# Patient Record
Sex: FEMALE | Race: OTHER
[De-identification: names, ages, dates, MRNs, and addresses within clinical notes are randomized per-mention and may not be internally consistent; named-entity substitution may affect disease eponyms.]

---

## 2018-09-02 ENCOUNTER — HOSPITAL ENCOUNTER (EMERGENCY)
Dept: HOSPITAL 8 - ED | Age: 23
LOS: 1 days | Discharge: HOME | End: 2018-09-03
Payer: COMMERCIAL

## 2018-09-02 VITALS — HEIGHT: 65 IN | BODY MASS INDEX: 35.96 KG/M2 | WEIGHT: 215.83 LBS

## 2018-09-02 DIAGNOSIS — N89.8: Primary | ICD-10-CM

## 2018-09-02 LAB
CULTURE INDICATED?: NO
HCG UR SG: 1.01 (ref 1–1.03)
MICROSCOPIC: (no result)

## 2018-09-02 PROCEDURE — 87808 TRICHOMONAS ASSAY W/OPTIC: CPT

## 2018-09-02 PROCEDURE — 87210 SMEAR WET MOUNT SALINE/INK: CPT

## 2018-09-02 PROCEDURE — 87591 N.GONORRHOEAE DNA AMP PROB: CPT

## 2018-09-02 PROCEDURE — 96372 THER/PROPH/DIAG INJ SC/IM: CPT

## 2018-09-02 PROCEDURE — 99284 EMERGENCY DEPT VISIT MOD MDM: CPT

## 2018-09-02 PROCEDURE — 81025 URINE PREGNANCY TEST: CPT

## 2018-09-02 PROCEDURE — 81003 URINALYSIS AUTO W/O SCOPE: CPT

## 2018-09-02 PROCEDURE — 87491 CHLMYD TRACH DNA AMP PROBE: CPT

## 2018-09-03 VITALS — SYSTOLIC BLOOD PRESSURE: 121 MMHG | DIASTOLIC BLOOD PRESSURE: 85 MMHG

## 2018-09-03 LAB
CLUE CELLS: (no result)
T VAGINALIS RRNA GENITAL QL PROBE: (no result)
WET PREP WBCS: (no result)

## 2018-09-09 ENCOUNTER — HOSPITAL ENCOUNTER (EMERGENCY)
Facility: MEDICAL CENTER | Age: 23
End: 2018-09-09
Attending: EMERGENCY MEDICINE
Payer: MEDICAID

## 2018-09-09 ENCOUNTER — APPOINTMENT (OUTPATIENT)
Dept: RADIOLOGY | Facility: MEDICAL CENTER | Age: 23
End: 2018-09-09
Attending: EMERGENCY MEDICINE
Payer: MEDICAID

## 2018-09-09 VITALS
RESPIRATION RATE: 16 BRPM | OXYGEN SATURATION: 98 % | DIASTOLIC BLOOD PRESSURE: 58 MMHG | HEART RATE: 80 BPM | SYSTOLIC BLOOD PRESSURE: 107 MMHG | HEIGHT: 66 IN | WEIGHT: 215.61 LBS | BODY MASS INDEX: 34.65 KG/M2 | TEMPERATURE: 98.1 F

## 2018-09-09 DIAGNOSIS — K59.00 CONSTIPATION, UNSPECIFIED CONSTIPATION TYPE: ICD-10-CM

## 2018-09-09 DIAGNOSIS — K62.5 RECTAL BLEEDING: ICD-10-CM

## 2018-09-09 LAB
ALBUMIN SERPL BCP-MCNC: 4.2 G/DL (ref 3.2–4.9)
ALBUMIN/GLOB SERPL: 1.3 G/DL
ALP SERPL-CCNC: 55 U/L (ref 30–99)
ALT SERPL-CCNC: 17 U/L (ref 2–50)
ANION GAP SERPL CALC-SCNC: 8 MMOL/L (ref 0–11.9)
APPEARANCE UR: CLEAR
AST SERPL-CCNC: 19 U/L (ref 12–45)
BACTERIA #/AREA URNS HPF: ABNORMAL /HPF
BASOPHILS # BLD AUTO: 0.3 % (ref 0–1.8)
BASOPHILS # BLD: 0.03 K/UL (ref 0–0.12)
BILIRUB SERPL-MCNC: 0.3 MG/DL (ref 0.1–1.5)
BILIRUB UR QL STRIP.AUTO: NEGATIVE
BUN SERPL-MCNC: 12 MG/DL (ref 8–22)
CALCIUM SERPL-MCNC: 9.8 MG/DL (ref 8.5–10.5)
CHLORIDE SERPL-SCNC: 105 MMOL/L (ref 96–112)
CO2 SERPL-SCNC: 25 MMOL/L (ref 20–33)
COLOR UR: YELLOW
CREAT SERPL-MCNC: 0.74 MG/DL (ref 0.5–1.4)
EOSINOPHIL # BLD AUTO: 0.08 K/UL (ref 0–0.51)
EOSINOPHIL NFR BLD: 0.8 % (ref 0–6.9)
EPI CELLS #/AREA URNS HPF: ABNORMAL /HPF
ERYTHROCYTE [DISTWIDTH] IN BLOOD BY AUTOMATED COUNT: 40.6 FL (ref 35.9–50)
GLOBULIN SER CALC-MCNC: 3.3 G/DL (ref 1.9–3.5)
GLUCOSE SERPL-MCNC: 116 MG/DL (ref 65–99)
GLUCOSE UR STRIP.AUTO-MCNC: NEGATIVE MG/DL
HCG UR QL: NEGATIVE
HCT VFR BLD AUTO: 37.6 % (ref 37–47)
HGB BLD-MCNC: 12.3 G/DL (ref 12–16)
HYALINE CASTS #/AREA URNS LPF: ABNORMAL /LPF
IMM GRANULOCYTES # BLD AUTO: 0.02 K/UL (ref 0–0.11)
IMM GRANULOCYTES NFR BLD AUTO: 0.2 % (ref 0–0.9)
INR PPP: 1.05 (ref 0.87–1.13)
KETONES UR STRIP.AUTO-MCNC: NEGATIVE MG/DL
LEUKOCYTE ESTERASE UR QL STRIP.AUTO: ABNORMAL
LYMPHOCYTES # BLD AUTO: 2.92 K/UL (ref 1–4.8)
LYMPHOCYTES NFR BLD: 29.6 % (ref 22–41)
MCH RBC QN AUTO: 27.4 PG (ref 27–33)
MCHC RBC AUTO-ENTMCNC: 32.7 G/DL (ref 33.6–35)
MCV RBC AUTO: 83.7 FL (ref 81.4–97.8)
MICRO URNS: ABNORMAL
MONOCYTES # BLD AUTO: 0.4 K/UL (ref 0–0.85)
MONOCYTES NFR BLD AUTO: 4 % (ref 0–13.4)
NEUTROPHILS # BLD AUTO: 6.43 K/UL (ref 2–7.15)
NEUTROPHILS NFR BLD: 65.1 % (ref 44–72)
NITRITE UR QL STRIP.AUTO: NEGATIVE
NRBC # BLD AUTO: 0 K/UL
NRBC BLD-RTO: 0 /100 WBC
PH UR STRIP.AUTO: 7 [PH]
PLATELET # BLD AUTO: 299 K/UL (ref 164–446)
PMV BLD AUTO: 10.6 FL (ref 9–12.9)
POTASSIUM SERPL-SCNC: 3.6 MMOL/L (ref 3.6–5.5)
PROT SERPL-MCNC: 7.5 G/DL (ref 6–8.2)
PROT UR QL STRIP: NEGATIVE MG/DL
PROTHROMBIN TIME: 13.4 SEC (ref 12–14.6)
RBC # BLD AUTO: 4.49 M/UL (ref 4.2–5.4)
RBC # URNS HPF: ABNORMAL /HPF
RBC UR QL AUTO: NEGATIVE
SODIUM SERPL-SCNC: 138 MMOL/L (ref 135–145)
SP GR UR REFRACTOMETRY: 1.01
SP GR UR STRIP.AUTO: 1.01
UROBILINOGEN UR STRIP.AUTO-MCNC: 0.2 MG/DL
WBC # BLD AUTO: 9.9 K/UL (ref 4.8–10.8)
WBC #/AREA URNS HPF: ABNORMAL /HPF

## 2018-09-09 PROCEDURE — 85025 COMPLETE CBC W/AUTO DIFF WBC: CPT

## 2018-09-09 PROCEDURE — 36415 COLL VENOUS BLD VENIPUNCTURE: CPT

## 2018-09-09 PROCEDURE — 81001 URINALYSIS AUTO W/SCOPE: CPT

## 2018-09-09 PROCEDURE — 85610 PROTHROMBIN TIME: CPT

## 2018-09-09 PROCEDURE — 700102 HCHG RX REV CODE 250 W/ 637 OVERRIDE(OP): Performed by: EMERGENCY MEDICINE

## 2018-09-09 PROCEDURE — 82272 OCCULT BLD FECES 1-3 TESTS: CPT

## 2018-09-09 PROCEDURE — 80053 COMPREHEN METABOLIC PANEL: CPT

## 2018-09-09 PROCEDURE — 74018 RADEX ABDOMEN 1 VIEW: CPT

## 2018-09-09 PROCEDURE — 81025 URINE PREGNANCY TEST: CPT

## 2018-09-09 PROCEDURE — A9270 NON-COVERED ITEM OR SERVICE: HCPCS | Performed by: EMERGENCY MEDICINE

## 2018-09-09 PROCEDURE — 99284 EMERGENCY DEPT VISIT MOD MDM: CPT

## 2018-09-09 RX ORDER — POLYETHYLENE GLYCOL 3350 17 G/17G
17 POWDER, FOR SOLUTION ORAL
Qty: 7 EACH | Refills: 0 | Status: ON HOLD | OUTPATIENT
Start: 2018-09-09 | End: 2019-09-27

## 2018-09-09 RX ORDER — IPRATROPIUM BROMIDE AND ALBUTEROL SULFATE 2.5; .5 MG/3ML; MG/3ML
3 SOLUTION RESPIRATORY (INHALATION) ONCE
Status: DISCONTINUED | OUTPATIENT
Start: 2018-09-09 | End: 2018-09-09

## 2018-09-09 RX ORDER — POLYETHYLENE GLYCOL 3350 17 G/17G
1 POWDER, FOR SOLUTION ORAL ONCE
Status: COMPLETED | OUTPATIENT
Start: 2018-09-09 | End: 2018-09-09

## 2018-09-09 RX ORDER — DOCUSATE SODIUM 100 MG/1
100 CAPSULE, LIQUID FILLED ORAL 2 TIMES DAILY
Qty: 14 CAP | Refills: 0 | Status: SHIPPED | OUTPATIENT
Start: 2018-09-09 | End: 2018-09-16

## 2018-09-09 RX ADMIN — POLYETHYLENE GLYCOL 3350 1 PACKET: 17 POWDER, FOR SOLUTION ORAL at 22:45

## 2018-09-09 ASSESSMENT — PAIN SCALES - GENERAL
PAINLEVEL_OUTOF10: 0
PAINLEVEL_OUTOF10: 9

## 2018-09-10 NOTE — ED NOTES
Patient is being discharged from ED to HOME  . Discharge instructions were discussed by RN with patient and/or Family. No questions at this time. Medications were discussed with patient. VS within normal limits or have been addressed with patient and MD.

## 2018-09-10 NOTE — DISCHARGE INSTRUCTIONS
Constipation, Adult  Constipation is when a person has fewer bowel movements in a week than normal, has difficulty having a bowel movement, or has stools that are dry, hard, or larger than normal. Constipation may be caused by an underlying condition. It may become worse with age if a person takes certain medicines and does not take in enough fluids.  Follow these instructions at home:  Eating and drinking  · Eat foods that have a lot of fiber, such as fresh fruits and vegetables, whole grains, and beans.  · Limit foods that are high in fat, low in fiber, or overly processed, such as french fries, hamburgers, cookies, candies, and soda.  · Drink enough fluid to keep your urine clear or pale yellow.  General instructions  · Exercise regularly or as told by your health care provider.  · Go to the restroom when you have the urge to go. Do not hold it in.  · Take over-the-counter and prescription medicines only as told by your health care provider. These include any fiber supplements.  · Practice pelvic floor retraining exercises, such as deep breathing while relaxing the lower abdomen and pelvic floor relaxation during bowel movements.  · Watch your condition for any changes.  · Keep all follow-up visits as told by your health care provider. This is important.  Contact a health care provider if:  · You have pain that gets worse.  · You have a fever.  · You do not have a bowel movement after 4 days.  · You vomit.  · You are not hungry.  · You lose weight.  · You are bleeding from the anus.  · You have thin, pencil-like stools.  Get help right away if:  · You have a fever and your symptoms suddenly get worse.  · You leak stool or have blood in your stool.  · Your abdomen is bloated.  · You have severe pain in your abdomen.  · You feel dizzy or you faint.  This information is not intended to replace advice given to you by your health care provider. Make sure you discuss any questions you have with your health care  "provider.  Document Released: 09/15/2005 Document Revised: 07/07/2017 Document Reviewed: 06/07/2017  [employer] Interactive Patient Education © 2017 [employer]        Bloody Stools  Bloody stools often mean that there is a problem in the digestive tract. Your caregiver may use the term \"melena\" to describe black, tarry, and bad smelling stools or \"hematochezia\" to describe red or maroon-colored stools. Blood seen in the stool can be caused by bleeding anywhere along the intestinal tract.   A black stool usually means that blood is coming from the upper part of the gastrointestinal tract (esophagus, stomach, or small bowel). Passing maroon-colored stools or bright red blood usually means that blood is coming from lower down in the large bowel or the rectum. However, sometimes massive bleeding in the stomach or small intestine can cause bright red bloody stools.   Consuming black licorice, lead, iron pills, medicines containing bismuth subsalicylate, or blueberries can also cause black stools. Your caregiver can test black stools to see if blood is present.  It is important that the cause of the bleeding be found. Treatment can then be started, and the problem can be corrected. Rectal bleeding may not be serious, but you should not assume everything is okay until you know the cause. It is very important to follow up with your caregiver or a specialist in gastrointestinal problems.  CAUSES   Blood in the stools can come from various underlying causes. Often, the cause is not found during your first visit. Testing is often needed to discover the cause of bleeding in the gastrointestinal tract. Causes range from simple to serious or even life-threatening. Possible causes include:  · Hemorrhoids. These are veins that are full of blood (engorged) in the rectum. They cause pain, inflammation, and may bleed.  · Anal fissures. These are areas of painful tearing which may bleed. They are often caused by passing hard " stool.  · Diverticulosis. These are pouches that form on the colon over time, with age, and may bleed significantly.  · Diverticulitis. This is inflammation in areas with diverticulosis. It can cause pain, fever, and bloody stools, although bleeding is rare.  · Proctitis and colitis. These are inflamed areas of the rectum or colon. They may cause pain, fever, and bloody stools.  · Polyps and cancer. Colon cancer is a leading cause of preventable cancer death. It often starts out as precancerous polyps that can be removed during a colonoscopy, preventing progression into cancer. Sometimes, polyps and cancer may cause rectal bleeding.  · Gastritis and ulcers. Bleeding from the upper gastrointestinal tract (near the stomach) may travel through the intestines and produce black, sometimes tarry, often bad smelling stools. In certain cases, if the bleeding is fast enough, the stools may not be black, but red and the condition may be life-threatening.  SYMPTOMS   You may have stools that are bright red and bloody, that are normal color with blood on them, or that are dark black and tarry. In some cases, you may only have blood in the toilet bowl. Any of these cases need medical care. You may also have:  · Pain at the anus or anywhere in the rectum.  · Lightheadedness or feeling faint.  · Extreme weakness.  · Nausea or vomiting.  · Fever.  DIAGNOSIS  Your caregiver may use the following methods to find the cause of your bleeding:  · Taking a medical history. Age is important. Older people tend to develop polyps and cancer more often. If there is anal pain and a hard, large stool associated with bleeding, a tear of the anus may be the cause. If blood drips into the toilet after a bowel movement, bleeding hemorrhoids may be the problem. The color and frequency of the bleeding are additional considerations. In most cases, the medical history provides clues, but seldom the final answer.  · A visual and finger (digital) exam.  Your caregiver will inspect the anal area, looking for tears and hemorrhoids. A finger exam can provide information when there is tenderness or a growth inside. In men, the prostate is also examined.  · Endoscopy. Several types of small, long scopes (endoscopes) are used to view the colon.  · In the office, your caregiver may use a rigid, or more commonly, a flexible viewing sigmoidoscope. This exam is called flexible sigmoidoscopy. It is performed in 5 to 10 minutes.  · A more thorough exam is accomplished with a colonoscope. It allows your caregiver to view the entire 5 to 6 foot long colon. Medicine to help you relax (sedative) is usually given for this exam. Frequently, a bleeding lesion may be present beyond the reach of the sigmoidoscope. So, a colonoscopy may be the best exam to start with. Both exams are usually done on an outpatient basis. This means the patient does not stay overnight in the hospital or surgery center.  · An upper endoscopy may be needed to examine your stomach. Sedation is used and a flexible endoscope is put in your mouth, down to your stomach.  · A barium enema X-ray. This is an X-ray exam. It uses liquid barium inserted by enema into the rectum. This test alone may not identify an actual bleeding point. X-rays highlight abnormal shadows, such as those made by lumps (tumors), diverticuli, or colitis.  TREATMENT   Treatment depends on the cause of your bleeding.   · For bleeding from the stomach or colon, the caregiver doing your endoscopy or colonoscopy may be able to stop the bleeding as part of the procedure.  · Inflammation or infection of the colon can be treated with medicines.  · Many rectal problems can be treated with creams, suppositories, or warm baths.  · Surgery is sometimes needed.  · Blood transfusions are sometimes needed if you have lost a lot of blood.  · For any bleeding problem, let your caregiver know if you take aspirin or other blood thinners regularly.  HOME CARE  INSTRUCTIONS   · Take any medicines exactly as prescribed.  · Keep your stools soft by eating a diet high in fiber. Prunes (1 to 3 a day) work well for many people.  · Drink enough water and fluids to keep your urine clear or pale yellow.  · Take sitz baths if advised. A sitz bath is when you sit in a bathtub with warm water for 10 to 15 minutes to soak, soothe, and cleanse the rectal area.  · If enemas or suppositories are advised, be sure you know how to use them. Tell your caregiver if you have problems with this.  · Monitor your bowel movements to look for signs of improvement or worsening.  SEEK MEDICAL CARE IF:   · You do not improve in the time expected.  · Your condition worsens after initial improvement.  · You develop any new symptoms.  SEEK IMMEDIATE MEDICAL CARE IF:   · You develop severe or prolonged rectal bleeding.  · You vomit blood.  · You feel weak or faint.  · You have a fever.  MAKE SURE YOU:  · Understand these instructions.  · Will watch your condition.  · Will get help right away if you are not doing well or get worse.  Document Released: 12/08/2003 Document Revised: 03/11/2013 Document Reviewed: 05/04/2012  [employer]® Patient Information ©2014 [employer], [employer].

## 2018-09-10 NOTE — ED PROVIDER NOTES
"CHIEF COMPLAINT  Chief Complaint   Patient presents with   • Constipation     Patient states she has been constipated since yesterday. Patient had a bowel movement 15 min. ago and had bright red blood in the bowel. Patient states she there is pain when she has a bowel movement.        HPI  Rain Stern is a 23 y.o. female who presents straining with passing bowel movements and bloody stool.  States that she typically has a bowel movement every other day.  States yesterday had straining with passage of stool and noted bleeding.  Denies vaginal bleeding.  No hematuria or dysuria.  No vomiting.  No abdominal pain.  No prior history of GI bleed.  No blood thinning agents.No chest pain, trouble breathing, lightheadedness.    REVIEW OF SYSTEMS  See HPI for further details. All other systems are negative.     PAST MEDICAL HISTORY   Denies even past medical history.  No history of chronic opioid use.    SOCIAL HISTORY  Social History     Social History Main Topics   • Smoking status: Not on file   • Smokeless tobacco: Not on file   • Alcohol use Not on file   • Drug use: Unknown   • Sexual activity: Not on file       SURGICAL HISTORY  patient denies any surgical history    CURRENT MEDICATIONS  Home Medications    **Home medications have not yet been reviewed for this encounter**         ALLERGIES  No Known Allergies    PHYSICAL EXAM  VITAL SIGNS: /71   Pulse 89   Temp 36.7 °C (98.1 °F)   Resp 16   Ht 1.676 m (5' 6\")   Wt 97.8 kg (215 lb 9.8 oz)   SpO2 98%   BMI 34.80 kg/m²   Pulse ox interpretation: I interpret this pulse ox as normal.  Constitutional: Alert in no apparent distress.  HENT: No signs of trauma, Bilateral external ears normal, Nose normal.   Eyes: Pupils are equal and reactive, Conjunctiva normal, Non-icteric.   Cardiovascular: Regular rate and rhythm, no murmurs.   Thorax & Lungs: Normal breath sounds, No respiratory distress  Abdomen: Bowel sounds normal, Soft, No tenderness, No masses, No " pulsatile masses. No peritoneal signs.  Rectal:   Trace guaiac positive; without gross blood or melena  Skin: Warm, Dry, No erythema, No rash.   Back: No bony tenderness, No CVA tenderness.   Extremities: Intact distal pulses, No edema, No tenderness, No cyanosis  Neurologic: Alert, No focal deficits noted.       DIAGNOSTIC STUDIES / PROCEDURES    LABS  Labs Reviewed   URINALYSIS - Abnormal; Notable for the following:        Result Value    Leukocyte Esterase Trace (*)     All other components within normal limits   CBC WITH DIFFERENTIAL - Abnormal; Notable for the following:     MCHC 32.7 (*)     All other components within normal limits    Narrative:     Indicate which anticoagulants the patient is on:->NONE   COMP METABOLIC PANEL - Abnormal; Notable for the following:     Glucose 116 (*)     All other components within normal limits    Narrative:     Indicate which anticoagulants the patient is on:->NONE   URINE MICROSCOPIC (W/UA) - Abnormal; Notable for the following:     RBC 5-10 (*)     Bacteria Few (*)     All other components within normal limits   HCG QUALITATIVE UR   REFRACTOMETER SG   PROTHROMBIN TIME    Narrative:     Indicate which anticoagulants the patient is on:->NONE   ESTIMATED GFR    Narrative:     Indicate which anticoagulants the patient is on:->NONE       RADIOLOGY  [id number] VIEW   Final Result         1.  Moderate stool in the colon suggests changes of constipation, otherwise nonspecific bowel gas pattern            COURSE & MEDICAL DECISION MAKING  Pertinent Labs & Imaging studies reviewed. (See chart for details)  23 y.o.  Female presenting with constipation, straining with passage of stool, resulting blood in the stool.  Bright red blood per rectum according to the patient.  No clinical signs or symptoms of anemia.  Laboratory studies with normal hemoglobin and no evidence of coagulopathy.  No abdominal tenderness.  No fevers.  No vaginal bleeding or hematuria.    On rectal examination,  "does not have evidence of anal fissures or obvious external hemorrhoids.  No active bleeding per rectum.  On rectal examination, did not palpate large hemorrhoids.  No gross blood on rectal examination.  Cannot palpate stool in the rectal vault.  Trace guaiac + on testing. Patient was referred to GI and primary care physician for further management.  We will treat with MiraLAX here and provide stool softeners and MiraLAX as needed for home care.  Recommending adequate oral hydration, regular exercise, increase fiber intake.  Less likely inflammatory bowel disease.  No evidence of anal fissures.  No left lower quadrant tenderness to suggest diverticulitis.  Symptoms most consistent with benign bright red blood per rectum episode at home secondary to constipation.    The patient will not drink alcohol nor drive with prescribed medications.   The patient will return for worsening symptoms or failure of improvement and is stable at the time of discharge. The patient verbalizes understanding in their own words.    /58   Pulse 80   Temp 36.7 °C (98.1 °F)   Resp 16   Ht 1.676 m (5' 6\")   Wt 97.8 kg (215 lb 9.8 oz)   SpO2 98%   BMI 34.80 kg/m²     [facility], Emergency Dept  [address] [facility] [address] [phone]    As needed, If symptoms worsen    Primary care doctor    Schedule an appointment as soon as possible for a visit      [facility]  Gastroenterologist follow-up  [phone]  Schedule an appointment as soon as possible for a visit        FINAL IMPRESSION  1. Constipation, unspecified constipation type    2. Rectal bleeding            Electronically signed by: Ho Moreno, 9/9/2018 9:28 PM      "

## 2018-09-10 NOTE — ED TRIAGE NOTES
Chief Complaint   Patient presents with   • Constipation     Patient states she has been constipated since yesterday. Patient had a bowel movement 15 min. ago and had bright red blood in the bowel. Patient states she there is pain when she has a bowel movement.        Patient ambulatory to triage room with steady gait. Patient placed back out lobby and educated on triage process. Family with her.

## 2018-10-18 ENCOUNTER — HOSPITAL ENCOUNTER (EMERGENCY)
Dept: HOSPITAL 8 - ED | Age: 23
Discharge: HOME | End: 2018-10-18
Payer: MEDICAID

## 2018-10-18 VITALS — WEIGHT: 212.75 LBS | BODY MASS INDEX: 32.24 KG/M2 | HEIGHT: 68 IN

## 2018-10-18 VITALS — DIASTOLIC BLOOD PRESSURE: 68 MMHG | SYSTOLIC BLOOD PRESSURE: 122 MMHG

## 2018-10-18 DIAGNOSIS — N89.8: Primary | ICD-10-CM

## 2018-10-18 LAB
ALBUMIN SERPL-MCNC: 3.6 G/DL (ref 3.4–5)
ANION GAP SERPL CALC-SCNC: 9 MMOL/L (ref 5–15)
BASOPHILS # BLD AUTO: 0.04 X10^3/UL (ref 0–0.1)
BASOPHILS NFR BLD AUTO: 0 % (ref 0–1)
CALCIUM SERPL-MCNC: 8.7 MG/DL (ref 8.5–10.1)
CHLORIDE SERPL-SCNC: 108 MMOL/L (ref 98–107)
CREAT SERPL-MCNC: 0.93 MG/DL (ref 0.55–1.02)
CULTURE INDICATED?: NO
EOSINOPHIL # BLD AUTO: 0.11 X10^3/UL (ref 0–0.4)
EOSINOPHIL NFR BLD AUTO: 1 % (ref 1–7)
ERYTHROCYTE [DISTWIDTH] IN BLOOD BY AUTOMATED COUNT: 13.7 % (ref 9.6–15.2)
LYMPHOCYTES # BLD AUTO: 3.27 X10^3/UL (ref 1–3.4)
LYMPHOCYTES NFR BLD AUTO: 31 % (ref 22–44)
MCH RBC QN AUTO: 27.5 PG (ref 27–34.8)
MCHC RBC AUTO-ENTMCNC: 33.2 G/DL (ref 32.4–35.8)
MCV RBC AUTO: 82.7 FL (ref 80–100)
MD: NO
MICROSCOPIC: (no result)
MONOCYTES # BLD AUTO: 0.37 X10^3/UL (ref 0.2–0.8)
MONOCYTES NFR BLD AUTO: 4 % (ref 2–9)
NEUTROPHILS # BLD AUTO: 6.85 X10^3/UL (ref 1.8–6.8)
NEUTROPHILS NFR BLD AUTO: 64 % (ref 42–75)
PLATELET # BLD AUTO: 306 X10^3/UL (ref 130–400)
PMV BLD AUTO: 8.9 FL (ref 7.4–10.4)
RBC # BLD AUTO: 4.81 X10^6/UL (ref 3.82–5.3)

## 2018-10-18 PROCEDURE — 82040 ASSAY OF SERUM ALBUMIN: CPT

## 2018-10-18 PROCEDURE — 85025 COMPLETE CBC W/AUTO DIFF WBC: CPT

## 2018-10-18 PROCEDURE — 84703 CHORIONIC GONADOTROPIN ASSAY: CPT

## 2018-10-18 PROCEDURE — 99284 EMERGENCY DEPT VISIT MOD MDM: CPT

## 2018-10-18 PROCEDURE — 36415 COLL VENOUS BLD VENIPUNCTURE: CPT

## 2018-10-18 PROCEDURE — 81003 URINALYSIS AUTO W/O SCOPE: CPT

## 2018-10-18 PROCEDURE — 80048 BASIC METABOLIC PNL TOTAL CA: CPT

## 2019-01-27 ENCOUNTER — HOSPITAL ENCOUNTER (EMERGENCY)
Facility: MEDICAL CENTER | Age: 24
End: 2019-01-27
Attending: EMERGENCY MEDICINE
Payer: MEDICAID

## 2019-01-27 VITALS
HEART RATE: 92 BPM | DIASTOLIC BLOOD PRESSURE: 79 MMHG | SYSTOLIC BLOOD PRESSURE: 136 MMHG | OXYGEN SATURATION: 97 % | RESPIRATION RATE: 14 BRPM | BODY MASS INDEX: 33.77 KG/M2 | HEIGHT: 66 IN | WEIGHT: 210.1 LBS | TEMPERATURE: 98.1 F

## 2019-01-27 DIAGNOSIS — R21 RASH: ICD-10-CM

## 2019-01-27 PROCEDURE — 99282 EMERGENCY DEPT VISIT SF MDM: CPT

## 2019-01-27 RX ORDER — TRIAMCINOLONE ACETONIDE 1 MG/G
CREAM TOPICAL
Qty: 1 TUBE | Refills: 1 | Status: ON HOLD | OUTPATIENT
Start: 2019-01-27 | End: 2019-09-27

## 2019-01-28 NOTE — ED PROVIDER NOTES
"ED Provider Note    CHIEF COMPLAINT   Chief Complaint   Patient presents with   • Itchy     dry itchy skin for 1 month       HPI   Rain Rosado is a 24 y.o. female who presents complaining of itching lesions about her wrists, legs, occasionally neck region.  She states they start as small bumps, have fluid inside them when she scratches.  No pustule.  No spreading redness.  Both of her children have similar bumps on their wrists and read lesions do not tend to occur on the trunk or back.  She denies large red welts.  No fever    REVIEW OF SYSTEMS   Constitutional: No fever  Skin itching skin rash/bumps    PAST MEDICAL HISTORY   No past medical history on file.    FAMILY HISTORY  No family history on file.    SOCIAL HISTORY  Social History     Social History   • Marital status: Single     Spouse name: N/A   • Number of children: N/A   • Years of education: N/A     Social History Main Topics   • Smoking status: Not on file   • Smokeless tobacco: Not on file   • Alcohol use Not on file   • Drug use: Unknown   • Sexual activity: Not on file     Other Topics Concern   • Not on file     Social History Narrative   • No narrative on file        SURGICAL HISTORY  No past surgical history on file.    CURRENT MEDICATIONS   Home Medications    **Home medications have not yet been reviewed for this encounter**         ALLERGIES   No Known Allergies    PHYSICAL EXAM  VITAL SIGNS: /79   Pulse 92   Temp 36.7 °C (98.1 °F) (Temporal)   Resp 14   Ht 1.676 m (5' 6\")   Wt 95.3 kg (210 lb 1.6 oz)   SpO2 97%   BMI 33.91 kg/m²   Constitutional: Well developed, Well nourished, No acute distress, Non-toxic appearance.   Cardiac: Normal heart rate, Normal rhythm   Pulmonary: Normal breath sounds  Skin: Small 1 mm papule, did not show classic umbilicated appearance.  Multiple others are excoriated with small eschar.  No cellulitis.  No pustules, no scaling  Vascular: Normal capillary refill, No cyanosis.       COURSE & " MEDICAL DECISION MAKING  Pertinent Labs & Imaging studies reviewed. (See chart for details)  Differential diagnosis includes insect bites, scabies, allergic reaction or hives.  As to other family members have similar complaint as well as a visit by a recent cousin, it is possible with the fluid-filled nature of the lesions to be molluscum contagiosum.  Mother advised to check the bedding at home for bedbugs.  She is prescribed Kenalog cream for herself to use on itching lesions, advised to avoid the face with this medication.  She is advised to use Benadryl for itching.  He is advised to see  for recheck in 1 week if not better.  Scabies less likely, as the trunk and upper legs appear to have been spared.  No facial lesions.    FINAL IMPRESSION  1. Rash            Electronically signed by: Ho Lyn, 1/27/2019 4:12 PM

## 2019-02-28 ENCOUNTER — HOSPITAL ENCOUNTER (EMERGENCY)
Facility: MEDICAL CENTER | Age: 24
End: 2019-02-28
Attending: EMERGENCY MEDICINE
Payer: MEDICAID

## 2019-02-28 VITALS
SYSTOLIC BLOOD PRESSURE: 126 MMHG | OXYGEN SATURATION: 98 % | DIASTOLIC BLOOD PRESSURE: 58 MMHG | RESPIRATION RATE: 16 BRPM | TEMPERATURE: 97.5 F | BODY MASS INDEX: 33.7 KG/M2 | WEIGHT: 214.73 LBS | HEIGHT: 67 IN | HEART RATE: 90 BPM

## 2019-02-28 DIAGNOSIS — B86 SCABIES: ICD-10-CM

## 2019-02-28 PROCEDURE — 99283 EMERGENCY DEPT VISIT LOW MDM: CPT

## 2019-02-28 RX ORDER — PERMETHRIN 50 MG/G
1 CREAM TOPICAL ONCE
Qty: 3 TUBE | Refills: 0 | Status: SHIPPED | OUTPATIENT
Start: 2019-02-28 | End: 2019-02-28

## 2019-03-01 NOTE — ED PROVIDER NOTES
"ED Provider Note        History obtained from: Patient    CHIEF COMPLAINT  Chief Complaint   Patient presents with   • Rash     itchy \"little bumps\" x 2 months       HPI  Rain CHE Rosado is a 24 y.o. female who presents with persistent rash.  Patient reports she was seen here approximately month ago for similar rash.  She reports that 1 of her sons initially had a pruritic rash with papules, which spread to her other son and subsequently to her.  She has been having the symptoms for approximately 2 weeks.  She reported is pruritic in nature.  His bilateral upper extremities, some on her torso.  She denies any history of eczema, asthma, fevers, oral lesions.    REVIEW OF SYSTEMS    CONSTITUTIONAL:  No fever.  CARDIOVASCULAR:  No chest discomfort.  RESPIRATORY:  No pleuritic chest pain.  GASTROINTESTINAL:  No abdominal pain.    See HPI for further details.       PAST MEDICAL HISTORY  History reviewed. No pertinent past medical history.    FAMILY HISTORY  History reviewed. No pertinent family history.    SOCIAL HISTORY   reports that she has never smoked. She does not have any smokeless tobacco history on file. She reports that she does not drink alcohol or use drugs.    SURGICAL HISTORY  History reviewed. No pertinent surgical history.    CURRENT MEDICATIONS  Home Medications     Reviewed by Anna Marie Jolley R.N. (Registered Nurse) on 02/28/19 at 1608  Med List Status: Complete   Medication Last Dose Status   polyethylene glycol/lytes (MIRALAX) Pack  Active   triamcinolone acetonide (KENALOG) 0.1 % Cream 2/28/2019 Active                ALLERGIES  No Known Allergies    PHYSICAL EXAM  VITAL SIGNS: /58   Pulse 90   Temp 36.4 °C (97.5 °F) (Temporal)   Resp 16   Ht 1.702 m (5' 7\")   Wt 97.4 kg (214 lb 11.7 oz)   LMP  (Within Weeks)   SpO2 98%   BMI 33.63 kg/m²    Gen: alert, no acute distress  HENT: ATNC  Eyes: normal conjuctiva  Resp: No resipiratory distress.   CV: No JVD   Abd: " Non-distended  Extremities: No deformity  Skin: Scattered palpable blanching papules involving the dorsum of the hands, finger webs, upper extremities, chest wall.          COURSE & MEDICAL DECISION MAKING  Pertinent Labs & Imaging studies reviewed. (See chart for details)    Patient presents with pruritic rash that is spread from one son to the other son to the patient.  Clinically, this is most likely scabies.  Patient will be prescribed permethrin and quantity sufficient to treat her family and advised to clean their clothing and other household items.  Both sons have similar skin lesions supporting this diagnosis.  No evidence of drug rash, Santos-Davi syndrome, other dangerous etiologies.    FINAL IMPRESSION  1. Scabies

## 2019-03-01 NOTE — DISCHARGE INSTRUCTIONS
You were seen in the emergency department for a rash.  This is most likely scabies.  You are being provided with a medication to apply to all of the skin of your body to treat this.  All affected people in the household should be treated.  You should wash all clothes and bedding and dry on high heat. items used within the preceding several days (clothing, linens, stuffed animals, etc) can be placed in a plastic bag for at least three days or washed with hot water and then ironed or dried in a hot dryer    You may require a second treatment after 1-2 weeks.  Other members of the household may develop symptoms up to 6 weeks later.    Please return to the emergency department or seek medical attention if you develop:  Fevers, lesions in the mouth, any other new or concerning findings

## 2019-03-01 NOTE — ED TRIAGE NOTES
".  Chief Complaint   Patient presents with   • Rash     itchy \"little bumps\" x 2 months     ./58   Pulse 90   Temp 36.4 °C (97.5 °F) (Temporal)   Resp 16   Ht 1.702 m (5' 7\")   Wt 97.4 kg (214 lb 11.7 oz)   LMP  (Within Weeks)   SpO2 98%   BMI 33.63 kg/m²     Ambulatory to triage with above complaints, seen here for same 1/27/19, using kenalog cream with no relief, educated on triage process, placed in lobby, told to inform staff of any changes in condition.    "

## 2019-09-27 ENCOUNTER — HOSPITAL ENCOUNTER (OUTPATIENT)
Facility: MEDICAL CENTER | Age: 24
End: 2019-09-27
Attending: OBSTETRICS & GYNECOLOGY | Admitting: OBSTETRICS & GYNECOLOGY
Payer: MEDICAID

## 2019-09-27 VITALS
DIASTOLIC BLOOD PRESSURE: 76 MMHG | HEIGHT: 68 IN | HEART RATE: 91 BPM | TEMPERATURE: 97.3 F | OXYGEN SATURATION: 98 % | SYSTOLIC BLOOD PRESSURE: 121 MMHG | RESPIRATION RATE: 18 BRPM | BODY MASS INDEX: 33.8 KG/M2 | WEIGHT: 223 LBS

## 2019-09-27 PROCEDURE — 81002 URINALYSIS NONAUTO W/O SCOPE: CPT

## 2019-09-27 ASSESSMENT — PAIN SCALES - GENERAL: PAINLEVEL: 2

## 2019-09-28 NOTE — PROGRESS NOTES
" EDC 20= 25.2 weeks gestations here with c/o sharp lower right quadrant pain since this AM. Denies LOF, VB, and states baby is \"moving more than he usually does.\" EFM/TOCO placed, VSS. POC urine complete.   1950: Report to Dr Ramirez. Will be down to see pt shortly.  :  working at bedside to see pt. Most likely round ligament pain, OK to discharge home  2030: Pt ambulated off unit in stable condition. All questions answered at this time.   "

## 2019-09-28 NOTE — CONSULTS
DATE OF SERVICE:  2019    HISTORY OF PRESENT ILLNESS:  This is a 24-year-old  3, para 2 at 25   weeks who presents to labor and delivery with intermittent right lower   quadrant pain.  This started this morning.  She notes that it is worse when   she ambulates or moves from a seated to a standing position.  She denies   contractions, loss of fluid, vaginal bleeding and reports that the baby is   moving well.  She denies any constipation, diarrhea, dysuria, hematuria or   urgency.  She notes that she recently started a new job and is doing   significantly more walking than she had been.    Prenatal care has been with Dr. Scott.  Her ADA is 2020.    PAST MEDICAL HISTORY:  None.    PAST SURGICAL HISTORY:   section x2.    MEDICATIONS:  Prenatal vitamins.    ALLERGIES:  NKDA.    GYNECOLOGIC HISTORY:  No history of sexually transmitted disease or HSV.    OBSTETRICAL HISTORY:  Patient is a  3, para 2.  She has had 2 prior    sections.    SOCIAL HISTORY:  She denies tobacco, alcohol or drugs.    OBJECTIVE:  VITAL SIGNS:  Afebrile.  Vital signs stable.  GENERAL:  She is a well-developed, well-nourished female in no acute distress.  ABDOMEN:  Gravid and nontender to palpation.  No rebound or guarding.  Pain   can be manipulated with lateral movement of the uterus.  EXTREMITIES:  No clubbing, cyanosis or edema.  PELVIC:  Tocometer shows no contractions.  Fetal heart tones baseline 150s   with moderate long-term variability.    LABORATORY DATA:  Urinalysis is unremarkable.    ASSESSMENT AND PLAN:  This is a 24-year-old  3, para 2 at 25 weeks.  1.  Patient appears clinically stable.  She has no evidence of  labor   or urinary tract infection.  Clinical exam and history are suggestive of round   ligament pain.  This would be consistent with her recent description of   increased activity and walking.  Advised Tylenol as needed and use of an   abdominal pregnancy support to  assist with this.  2.  Fetal surveillance is reassuring for gestational age.       ____________________________________     MD MELISSA MCGEE / SHIREEN    DD:  09/27/2019 20:29:04  DT:  09/27/2019 22:37:19    D#:  [id number]  Job#:  [id number]

## 2019-09-30 LAB
APPEARANCE UR: ABNORMAL
COLOR UR AUTO: YELLOW
GLUCOSE UR QL STRIP.AUTO: NEGATIVE MG/DL
KETONES UR QL STRIP.AUTO: NEGATIVE MG/DL
LEUKOCYTE ESTERASE UR QL STRIP.AUTO: NEGATIVE
NITRITE UR QL STRIP.AUTO: NEGATIVE
PH UR STRIP.AUTO: 7 [PH] (ref 5–8)
PROT UR QL STRIP: NEGATIVE MG/DL
RBC UR QL AUTO: NEGATIVE
SP GR UR: 1.01 (ref 1–1.03)

## 2019-12-26 ENCOUNTER — ANESTHESIA (OUTPATIENT)
Dept: OBGYN | Facility: MEDICAL CENTER | Age: 24
End: 2019-12-26
Payer: MEDICAID

## 2019-12-26 ENCOUNTER — HOSPITAL ENCOUNTER (INPATIENT)
Facility: MEDICAL CENTER | Age: 24
LOS: 3 days | End: 2019-12-29
Attending: OBSTETRICS & GYNECOLOGY | Admitting: OBSTETRICS & GYNECOLOGY
Payer: MEDICAID

## 2019-12-26 ENCOUNTER — ANESTHESIA EVENT (OUTPATIENT)
Dept: OBGYN | Facility: MEDICAL CENTER | Age: 24
End: 2019-12-26
Payer: MEDICAID

## 2019-12-26 DIAGNOSIS — Z98.891 S/P CESAREAN SECTION: ICD-10-CM

## 2019-12-26 DIAGNOSIS — G89.18 ACUTE POST-OPERATIVE PAIN: ICD-10-CM

## 2019-12-26 LAB
ALBUMIN SERPL BCP-MCNC: 3.3 G/DL (ref 3.2–4.9)
ALBUMIN/GLOB SERPL: 0.9 G/DL
ALP SERPL-CCNC: 147 U/L (ref 30–99)
ALT SERPL-CCNC: 9 U/L (ref 2–50)
ANION GAP SERPL CALC-SCNC: 11 MMOL/L (ref 0–11.9)
APPEARANCE UR: CLEAR
AST SERPL-CCNC: 19 U/L (ref 12–45)
BASOPHILS # BLD AUTO: 0.1 % (ref 0–1.8)
BASOPHILS # BLD: 0.01 K/UL (ref 0–0.12)
BILIRUB SERPL-MCNC: 0.3 MG/DL (ref 0.1–1.5)
BUN SERPL-MCNC: 8 MG/DL (ref 8–22)
CALCIUM SERPL-MCNC: 8.7 MG/DL (ref 8.5–10.5)
CHLORIDE SERPL-SCNC: 108 MMOL/L (ref 96–112)
CO2 SERPL-SCNC: 18 MMOL/L (ref 20–33)
COLOR UR AUTO: YELLOW
CREAT SERPL-MCNC: 0.49 MG/DL (ref 0.5–1.4)
EOSINOPHIL # BLD AUTO: 0.01 K/UL (ref 0–0.51)
EOSINOPHIL NFR BLD: 0.1 % (ref 0–6.9)
ERYTHROCYTE [DISTWIDTH] IN BLOOD BY AUTOMATED COUNT: 40 FL (ref 35.9–50)
GLOBULIN SER CALC-MCNC: 3.7 G/DL (ref 1.9–3.5)
GLUCOSE SERPL-MCNC: 75 MG/DL (ref 65–99)
GLUCOSE UR QL STRIP.AUTO: NEGATIVE MG/DL
HCT VFR BLD AUTO: 36.5 % (ref 37–47)
HGB BLD-MCNC: 12 G/DL (ref 12–16)
HOLDING TUBE BB 8507: NORMAL
IMM GRANULOCYTES # BLD AUTO: 0.02 K/UL (ref 0–0.11)
IMM GRANULOCYTES NFR BLD AUTO: 0.2 % (ref 0–0.9)
KETONES UR QL STRIP.AUTO: NEGATIVE MG/DL
LEUKOCYTE ESTERASE UR QL STRIP.AUTO: ABNORMAL
LYMPHOCYTES # BLD AUTO: 1.58 K/UL (ref 1–4.8)
LYMPHOCYTES NFR BLD: 18.9 % (ref 22–41)
MCH RBC QN AUTO: 27.4 PG (ref 27–33)
MCHC RBC AUTO-ENTMCNC: 32.9 G/DL (ref 33.6–35)
MCV RBC AUTO: 83.3 FL (ref 81.4–97.8)
MONOCYTES # BLD AUTO: 0.43 K/UL (ref 0–0.85)
MONOCYTES NFR BLD AUTO: 5.1 % (ref 0–13.4)
NEUTROPHILS # BLD AUTO: 6.33 K/UL (ref 2–7.15)
NEUTROPHILS NFR BLD: 75.6 % (ref 44–72)
NITRITE UR QL STRIP.AUTO: NEGATIVE
NRBC # BLD AUTO: 0 K/UL
NRBC BLD-RTO: 0 /100 WBC
PH UR STRIP.AUTO: 7 [PH] (ref 5–8)
PLATELET # BLD AUTO: 230 K/UL (ref 164–446)
PMV BLD AUTO: 11.7 FL (ref 9–12.9)
POTASSIUM SERPL-SCNC: 3.6 MMOL/L (ref 3.6–5.5)
PROT SERPL-MCNC: 7 G/DL (ref 6–8.2)
PROT UR QL STRIP: NEGATIVE MG/DL
RBC # BLD AUTO: 4.38 M/UL (ref 4.2–5.4)
RBC UR QL AUTO: NEGATIVE
SODIUM SERPL-SCNC: 137 MMOL/L (ref 135–145)
SP GR UR: 1.02 (ref 1–1.03)
URATE SERPL-MCNC: 4.2 MG/DL (ref 1.9–8.2)
WBC # BLD AUTO: 8.4 K/UL (ref 4.8–10.8)

## 2019-12-26 PROCEDURE — 306828 HCHG ANES-TIME GENERAL: Performed by: OBSTETRICS & GYNECOLOGY

## 2019-12-26 PROCEDURE — 80053 COMPREHEN METABOLIC PANEL: CPT

## 2019-12-26 PROCEDURE — 304966 HCHG RECOVERY SVSC TIME ADDL 1/2 HR: Performed by: OBSTETRICS & GYNECOLOGY

## 2019-12-26 PROCEDURE — 700101 HCHG RX REV CODE 250: Performed by: ANESTHESIOLOGY

## 2019-12-26 PROCEDURE — 700102 HCHG RX REV CODE 250 W/ 637 OVERRIDE(OP): Performed by: ANESTHESIOLOGY

## 2019-12-26 PROCEDURE — 59514 CESAREAN DELIVERY ONLY: CPT

## 2019-12-26 PROCEDURE — 36415 COLL VENOUS BLD VENIPUNCTURE: CPT

## 2019-12-26 PROCEDURE — 700111 HCHG RX REV CODE 636 W/ 250 OVERRIDE (IP): Performed by: OBSTETRICS & GYNECOLOGY

## 2019-12-26 PROCEDURE — 700111 HCHG RX REV CODE 636 W/ 250 OVERRIDE (IP): Performed by: ANESTHESIOLOGY

## 2019-12-26 PROCEDURE — 305385 HCHG SURGICAL SERVICES 1/4 HOUR: Performed by: OBSTETRICS & GYNECOLOGY

## 2019-12-26 PROCEDURE — A9270 NON-COVERED ITEM OR SERVICE: HCPCS | Performed by: OBSTETRICS & GYNECOLOGY

## 2019-12-26 PROCEDURE — 770002 HCHG ROOM/CARE - OB PRIVATE (112)

## 2019-12-26 PROCEDURE — 84550 ASSAY OF BLOOD/URIC ACID: CPT

## 2019-12-26 PROCEDURE — 700111 HCHG RX REV CODE 636 W/ 250 OVERRIDE (IP)

## 2019-12-26 PROCEDURE — 700102 HCHG RX REV CODE 250 W/ 637 OVERRIDE(OP): Performed by: OBSTETRICS & GYNECOLOGY

## 2019-12-26 PROCEDURE — 304964 HCHG RECOVERY ROOM TIME 1HR: Performed by: OBSTETRICS & GYNECOLOGY

## 2019-12-26 PROCEDURE — A9270 NON-COVERED ITEM OR SERVICE: HCPCS | Performed by: ANESTHESIOLOGY

## 2019-12-26 PROCEDURE — 85025 COMPLETE CBC W/AUTO DIFF WBC: CPT

## 2019-12-26 PROCEDURE — 81002 URINALYSIS NONAUTO W/O SCOPE: CPT

## 2019-12-26 PROCEDURE — 700105 HCHG RX REV CODE 258: Performed by: ANESTHESIOLOGY

## 2019-12-26 PROCEDURE — 700105 HCHG RX REV CODE 258: Performed by: OBSTETRICS & GYNECOLOGY

## 2019-12-26 RX ORDER — SODIUM CHLORIDE, SODIUM GLUCONATE, SODIUM ACETATE, POTASSIUM CHLORIDE AND MAGNESIUM CHLORIDE 526; 502; 368; 37; 30 MG/100ML; MG/100ML; MG/100ML; MG/100ML; MG/100ML
1500 INJECTION, SOLUTION INTRAVENOUS ONCE
Status: COMPLETED | OUTPATIENT
Start: 2019-12-26 | End: 2019-12-26

## 2019-12-26 RX ORDER — CEFAZOLIN SODIUM 1 G/3ML
INJECTION, POWDER, FOR SOLUTION INTRAMUSCULAR; INTRAVENOUS PRN
Status: DISCONTINUED | OUTPATIENT
Start: 2019-12-26 | End: 2019-12-26 | Stop reason: SURG

## 2019-12-26 RX ORDER — OXYCODONE HYDROCHLORIDE AND ACETAMINOPHEN 5; 325 MG/1; MG/1
1 TABLET ORAL EVERY 4 HOURS PRN
Status: DISCONTINUED | OUTPATIENT
Start: 2019-12-26 | End: 2019-12-26

## 2019-12-26 RX ORDER — OXYCODONE AND ACETAMINOPHEN 10; 325 MG/1; MG/1
1 TABLET ORAL EVERY 4 HOURS PRN
Status: DISCONTINUED | OUTPATIENT
Start: 2019-12-26 | End: 2019-12-27

## 2019-12-26 RX ORDER — BUPIVACAINE HYDROCHLORIDE 7.5 MG/ML
INJECTION, SOLUTION INTRASPINAL PRN
Status: DISCONTINUED | OUTPATIENT
Start: 2019-12-26 | End: 2019-12-26 | Stop reason: SURG

## 2019-12-26 RX ORDER — SIMETHICONE 80 MG
80 TABLET,CHEWABLE ORAL 4 TIMES DAILY PRN
Status: DISCONTINUED | OUTPATIENT
Start: 2019-12-26 | End: 2019-12-29 | Stop reason: HOSPADM

## 2019-12-26 RX ORDER — ONDANSETRON 2 MG/ML
4 INJECTION INTRAMUSCULAR; INTRAVENOUS EVERY 6 HOURS PRN
Status: DISCONTINUED | OUTPATIENT
Start: 2019-12-26 | End: 2019-12-26

## 2019-12-26 RX ORDER — ONDANSETRON 4 MG/1
4 TABLET, ORALLY DISINTEGRATING ORAL EVERY 6 HOURS PRN
Status: DISCONTINUED | OUTPATIENT
Start: 2019-12-26 | End: 2019-12-29 | Stop reason: HOSPADM

## 2019-12-26 RX ORDER — SODIUM CHLORIDE, SODIUM LACTATE, POTASSIUM CHLORIDE, CALCIUM CHLORIDE 600; 310; 30; 20 MG/100ML; MG/100ML; MG/100ML; MG/100ML
INJECTION, SOLUTION INTRAVENOUS CONTINUOUS
Status: DISCONTINUED | OUTPATIENT
Start: 2019-12-26 | End: 2019-12-29 | Stop reason: HOSPADM

## 2019-12-26 RX ORDER — KETOROLAC TROMETHAMINE 30 MG/ML
30 INJECTION, SOLUTION INTRAMUSCULAR; INTRAVENOUS EVERY 6 HOURS
Status: DISCONTINUED | OUTPATIENT
Start: 2019-12-27 | End: 2019-12-26

## 2019-12-26 RX ORDER — MISOPROSTOL 200 UG/1
600 TABLET ORAL
Status: DISCONTINUED | OUTPATIENT
Start: 2019-12-26 | End: 2019-12-29 | Stop reason: HOSPADM

## 2019-12-26 RX ORDER — KETOROLAC TROMETHAMINE 30 MG/ML
30 INJECTION, SOLUTION INTRAMUSCULAR; INTRAVENOUS EVERY 6 HOURS
Status: COMPLETED | OUTPATIENT
Start: 2019-12-27 | End: 2019-12-27

## 2019-12-26 RX ORDER — SODIUM CHLORIDE, SODIUM GLUCONATE, SODIUM ACETATE, POTASSIUM CHLORIDE AND MAGNESIUM CHLORIDE 526; 502; 368; 37; 30 MG/100ML; MG/100ML; MG/100ML; MG/100ML; MG/100ML
INJECTION, SOLUTION INTRAVENOUS
Status: DISCONTINUED | OUTPATIENT
Start: 2019-12-26 | End: 2019-12-26 | Stop reason: SURG

## 2019-12-26 RX ORDER — MORPHINE SULFATE 0.5 MG/ML
INJECTION, SOLUTION EPIDURAL; INTRATHECAL; INTRAVENOUS PRN
Status: DISCONTINUED | OUTPATIENT
Start: 2019-12-26 | End: 2019-12-26 | Stop reason: HOSPADM

## 2019-12-26 RX ORDER — CITRIC ACID/SODIUM CITRATE 334-500MG
30 SOLUTION, ORAL ORAL ONCE
Status: COMPLETED | OUTPATIENT
Start: 2019-12-26 | End: 2019-12-26

## 2019-12-26 RX ORDER — SODIUM CHLORIDE, SODIUM LACTATE, POTASSIUM CHLORIDE, CALCIUM CHLORIDE 600; 310; 30; 20 MG/100ML; MG/100ML; MG/100ML; MG/100ML
INJECTION, SOLUTION INTRAVENOUS PRN
Status: DISCONTINUED | OUTPATIENT
Start: 2019-12-26 | End: 2019-12-29 | Stop reason: HOSPADM

## 2019-12-26 RX ORDER — SCOLOPAMINE TRANSDERMAL SYSTEM 1 MG/1
1 PATCH, EXTENDED RELEASE TRANSDERMAL
Status: DISCONTINUED | OUTPATIENT
Start: 2019-12-26 | End: 2019-12-26 | Stop reason: HOSPADM

## 2019-12-26 RX ORDER — METOCLOPRAMIDE HYDROCHLORIDE 5 MG/ML
10 INJECTION INTRAMUSCULAR; INTRAVENOUS ONCE
Status: COMPLETED | OUTPATIENT
Start: 2019-12-26 | End: 2019-12-26

## 2019-12-26 RX ORDER — IBUPROFEN 600 MG/1
600 TABLET ORAL EVERY 6 HOURS PRN
Status: DISCONTINUED | OUTPATIENT
Start: 2019-12-26 | End: 2019-12-27

## 2019-12-26 RX ORDER — ACETAMINOPHEN 500 MG
1000 TABLET ORAL EVERY 6 HOURS
Status: COMPLETED | OUTPATIENT
Start: 2019-12-26 | End: 2019-12-27

## 2019-12-26 RX ORDER — KETOROLAC TROMETHAMINE 30 MG/ML
INJECTION, SOLUTION INTRAMUSCULAR; INTRAVENOUS PRN
Status: DISCONTINUED | OUTPATIENT
Start: 2019-12-26 | End: 2019-12-26 | Stop reason: SURG

## 2019-12-26 RX ORDER — ONDANSETRON 2 MG/ML
4 INJECTION INTRAMUSCULAR; INTRAVENOUS EVERY 6 HOURS PRN
Status: ACTIVE | OUTPATIENT
Start: 2019-12-26 | End: 2019-12-27

## 2019-12-26 RX ORDER — MEPERIDINE HYDROCHLORIDE 25 MG/ML
INJECTION INTRAMUSCULAR; INTRAVENOUS; SUBCUTANEOUS PRN
Status: DISCONTINUED | OUTPATIENT
Start: 2019-12-26 | End: 2019-12-26 | Stop reason: SURG

## 2019-12-26 RX ORDER — DIPHENHYDRAMINE HYDROCHLORIDE 50 MG/ML
12.5 INJECTION INTRAMUSCULAR; INTRAVENOUS EVERY 6 HOURS PRN
Status: DISPENSED | OUTPATIENT
Start: 2019-12-26 | End: 2019-12-27

## 2019-12-26 RX ORDER — DIPHENHYDRAMINE HYDROCHLORIDE 50 MG/ML
25 INJECTION INTRAMUSCULAR; INTRAVENOUS EVERY 6 HOURS PRN
Status: ACTIVE | OUTPATIENT
Start: 2019-12-26 | End: 2019-12-27

## 2019-12-26 RX ORDER — METHYLERGONOVINE MALEATE 0.2 MG/ML
0.2 INJECTION INTRAVENOUS
Status: DISCONTINUED | OUTPATIENT
Start: 2019-12-26 | End: 2019-12-29 | Stop reason: HOSPADM

## 2019-12-26 RX ORDER — HYDROMORPHONE HYDROCHLORIDE 1 MG/ML
0.2 INJECTION, SOLUTION INTRAMUSCULAR; INTRAVENOUS; SUBCUTANEOUS
Status: ACTIVE | OUTPATIENT
Start: 2019-12-26 | End: 2019-12-27

## 2019-12-26 RX ORDER — OXYCODONE HYDROCHLORIDE 5 MG/1
5 TABLET ORAL EVERY 4 HOURS PRN
Status: ACTIVE | OUTPATIENT
Start: 2019-12-26 | End: 2019-12-27

## 2019-12-26 RX ORDER — PHENYLEPHRINE HYDROCHLORIDE 10 MG/ML
INJECTION, SOLUTION INTRAMUSCULAR; INTRAVENOUS; SUBCUTANEOUS PRN
Status: DISCONTINUED | OUTPATIENT
Start: 2019-12-26 | End: 2019-12-26 | Stop reason: HOSPADM

## 2019-12-26 RX ORDER — DOCUSATE SODIUM 100 MG/1
100 CAPSULE, LIQUID FILLED ORAL 2 TIMES DAILY PRN
Status: DISCONTINUED | OUTPATIENT
Start: 2019-12-26 | End: 2019-12-29 | Stop reason: HOSPADM

## 2019-12-26 RX ORDER — OXYCODONE HYDROCHLORIDE 10 MG/1
10 TABLET ORAL EVERY 4 HOURS PRN
Status: ACTIVE | OUTPATIENT
Start: 2019-12-26 | End: 2019-12-27

## 2019-12-26 RX ADMIN — PHENYLEPHRINE HYDROCHLORIDE 100 MCG: 10 INJECTION INTRAVENOUS at 20:04

## 2019-12-26 RX ADMIN — BUPIVACAINE HYDROCHLORIDE IN DEXTROSE 1.6 ML: 7.5 INJECTION, SOLUTION SUBARACHNOID at 20:06

## 2019-12-26 RX ADMIN — SODIUM CHLORIDE, SODIUM GLUCONATE, SODIUM ACETATE, POTASSIUM CHLORIDE AND MAGNESIUM CHLORIDE 1000 ML: 526; 502; 368; 37; 30 INJECTION, SOLUTION INTRAVENOUS at 19:00

## 2019-12-26 RX ADMIN — Medication 2000 ML/HR: at 21:30

## 2019-12-26 RX ADMIN — SODIUM CITRATE AND CITRIC ACID MONOHYDRATE 30 ML: 500; 334 SOLUTION ORAL at 19:35

## 2019-12-26 RX ADMIN — MORPHINE SULFATE 0.15 MG: 0.5 INJECTION, SOLUTION EPIDURAL; INTRATHECAL; INTRAVENOUS at 20:06

## 2019-12-26 RX ADMIN — KETOROLAC TROMETHAMINE 30 MG: 30 INJECTION, SOLUTION INTRAMUSCULAR at 20:42

## 2019-12-26 RX ADMIN — PHENYLEPHRINE HYDROCHLORIDE 100 MCG: 10 INJECTION INTRAVENOUS at 19:55

## 2019-12-26 RX ADMIN — CEFAZOLIN 2 G: 330 INJECTION, POWDER, FOR SOLUTION INTRAMUSCULAR; INTRAVENOUS at 19:31

## 2019-12-26 RX ADMIN — MEPERIDINE HYDROCHLORIDE 25 MG: 25 INJECTION INTRAMUSCULAR; INTRAVENOUS; SUBCUTANEOUS at 20:22

## 2019-12-26 RX ADMIN — ACETAMINOPHEN 1000 MG: 500 TABLET ORAL at 22:41

## 2019-12-26 RX ADMIN — FAMOTIDINE 20 MG: 10 INJECTION INTRAVENOUS at 19:35

## 2019-12-26 RX ADMIN — SODIUM CHLORIDE, SODIUM GLUCONATE, SODIUM ACETATE, POTASSIUM CHLORIDE AND MAGNESIUM CHLORIDE: 526; 502; 368; 37; 30 INJECTION, SOLUTION INTRAVENOUS at 19:31

## 2019-12-26 RX ADMIN — PHENYLEPHRINE HYDROCHLORIDE 100 MCG: 10 INJECTION INTRAVENOUS at 19:59

## 2019-12-26 RX ADMIN — METOCLOPRAMIDE 10 MG: 5 INJECTION, SOLUTION INTRAMUSCULAR; INTRAVENOUS at 19:35

## 2019-12-26 RX ADMIN — Medication 125 ML/HR: at 21:17

## 2019-12-26 SDOH — ECONOMIC STABILITY: TRANSPORTATION INSECURITY
IN THE PAST 12 MONTHS, HAS THE LACK OF TRANSPORTATION KEPT YOU FROM MEDICAL APPOINTMENTS OR FROM GETTING MEDICATIONS?: NO

## 2019-12-26 SDOH — ECONOMIC STABILITY: FOOD INSECURITY: WITHIN THE PAST 12 MONTHS, YOU WORRIED THAT YOUR FOOD WOULD RUN OUT BEFORE YOU GOT MONEY TO BUY MORE.: NEVER TRUE

## 2019-12-26 SDOH — ECONOMIC STABILITY: FOOD INSECURITY: WITHIN THE PAST 12 MONTHS, THE FOOD YOU BOUGHT JUST DIDN'T LAST AND YOU DIDN'T HAVE MONEY TO GET MORE.: NEVER TRUE

## 2019-12-26 SDOH — ECONOMIC STABILITY: TRANSPORTATION INSECURITY
IN THE PAST 12 MONTHS, HAS LACK OF TRANSPORTATION KEPT YOU FROM MEETINGS, WORK, OR FROM GETTING THINGS NEEDED FOR DAILY LIVING?: NO

## 2019-12-26 ASSESSMENT — COPD QUESTIONNAIRES
IN THE PAST 12 MONTHS DO YOU DO LESS THAN YOU USED TO BECAUSE OF YOUR BREATHING PROBLEMS: DISAGREE/UNSURE
COPD SCREENING SCORE: 0
DO YOU EVER COUGH UP ANY MUCUS OR PHLEGM?: NO/ONLY WITH OCCASIONAL COLDS OR INFECTIONS
DURING THE PAST 4 WEEKS HOW MUCH DID YOU FEEL SHORT OF BREATH: NONE/LITTLE OF THE TIME
HAVE YOU SMOKED AT LEAST 100 CIGARETTES IN YOUR ENTIRE LIFE: NO/DON'T KNOW

## 2019-12-26 ASSESSMENT — PAIN SCALES - GENERAL: PAIN_LEVEL: 0

## 2019-12-26 ASSESSMENT — LIFESTYLE VARIABLES: EVER_SMOKED: NEVER

## 2019-12-27 LAB
ERYTHROCYTE [DISTWIDTH] IN BLOOD BY AUTOMATED COUNT: 41.1 FL (ref 35.9–50)
HCT VFR BLD AUTO: 28.7 % (ref 37–47)
HGB BLD-MCNC: 9.4 G/DL (ref 12–16)
MCH RBC QN AUTO: 27.6 PG (ref 27–33)
MCHC RBC AUTO-ENTMCNC: 32.8 G/DL (ref 33.6–35)
MCV RBC AUTO: 84.2 FL (ref 81.4–97.8)
PLATELET # BLD AUTO: 218 K/UL (ref 164–446)
PMV BLD AUTO: 11.3 FL (ref 9–12.9)
RBC # BLD AUTO: 3.41 M/UL (ref 4.2–5.4)
WBC # BLD AUTO: 10.3 K/UL (ref 4.8–10.8)

## 2019-12-27 PROCEDURE — 770002 HCHG ROOM/CARE - OB PRIVATE (112)

## 2019-12-27 PROCEDURE — 700111 HCHG RX REV CODE 636 W/ 250 OVERRIDE (IP): Performed by: ANESTHESIOLOGY

## 2019-12-27 PROCEDURE — 700105 HCHG RX REV CODE 258: Performed by: OBSTETRICS & GYNECOLOGY

## 2019-12-27 PROCEDURE — A9270 NON-COVERED ITEM OR SERVICE: HCPCS | Performed by: ANESTHESIOLOGY

## 2019-12-27 PROCEDURE — 700102 HCHG RX REV CODE 250 W/ 637 OVERRIDE(OP): Performed by: ANESTHESIOLOGY

## 2019-12-27 PROCEDURE — 36415 COLL VENOUS BLD VENIPUNCTURE: CPT

## 2019-12-27 PROCEDURE — 85027 COMPLETE CBC AUTOMATED: CPT

## 2019-12-27 RX ORDER — OXYCODONE AND ACETAMINOPHEN 10; 325 MG/1; MG/1
1 TABLET ORAL EVERY 4 HOURS PRN
Status: DISCONTINUED | OUTPATIENT
Start: 2019-12-27 | End: 2019-12-29 | Stop reason: HOSPADM

## 2019-12-27 RX ORDER — SODIUM CHLORIDE, SODIUM LACTATE, POTASSIUM CHLORIDE, AND CALCIUM CHLORIDE .6; .31; .03; .02 G/100ML; G/100ML; G/100ML; G/100ML
500 INJECTION, SOLUTION INTRAVENOUS ONCE
Status: COMPLETED | OUTPATIENT
Start: 2019-12-27 | End: 2019-12-27

## 2019-12-27 RX ORDER — IBUPROFEN 600 MG/1
600 TABLET ORAL EVERY 6 HOURS PRN
Status: DISCONTINUED | OUTPATIENT
Start: 2019-12-27 | End: 2019-12-29 | Stop reason: HOSPADM

## 2019-12-27 RX ADMIN — ACETAMINOPHEN 1000 MG: 500 TABLET ORAL at 05:00

## 2019-12-27 RX ADMIN — KETOROLAC TROMETHAMINE 30 MG: 30 INJECTION, SOLUTION INTRAMUSCULAR at 15:18

## 2019-12-27 RX ADMIN — KETOROLAC TROMETHAMINE 30 MG: 30 INJECTION, SOLUTION INTRAMUSCULAR at 09:25

## 2019-12-27 RX ADMIN — SODIUM CHLORIDE, POTASSIUM CHLORIDE, SODIUM LACTATE AND CALCIUM CHLORIDE 500 ML: 600; 310; 30; 20 INJECTION, SOLUTION INTRAVENOUS at 06:15

## 2019-12-27 RX ADMIN — KETOROLAC TROMETHAMINE 30 MG: 30 INJECTION, SOLUTION INTRAMUSCULAR at 20:52

## 2019-12-27 RX ADMIN — KETOROLAC TROMETHAMINE 30 MG: 30 INJECTION, SOLUTION INTRAMUSCULAR at 02:57

## 2019-12-27 RX ADMIN — ACETAMINOPHEN 1000 MG: 500 TABLET ORAL at 17:29

## 2019-12-27 RX ADMIN — ACETAMINOPHEN 1000 MG: 500 TABLET ORAL at 11:19

## 2019-12-27 RX ADMIN — DIPHENHYDRAMINE HYDROCHLORIDE 12.5 MG: 50 INJECTION INTRAMUSCULAR; INTRAVENOUS at 02:57

## 2019-12-27 ASSESSMENT — EDINBURGH POSTNATAL DEPRESSION SCALE (EPDS)
I HAVE BLAMED MYSELF UNNECESSARILY WHEN THINGS WENT WRONG: YES, SOME OF THE TIME
I HAVE FELT SCARED OR PANICKY FOR NO GOOD REASON: NO, NOT MUCH
THE THOUGHT OF HARMING MYSELF HAS OCCURRED TO ME: NEVER
I HAVE BEEN ANXIOUS OR WORRIED FOR NO GOOD REASON: YES, SOMETIMES
I HAVE FELT SAD OR MISERABLE: NOT VERY OFTEN
I HAVE BEEN SO UNHAPPY THAT I HAVE HAD DIFFICULTY SLEEPING: NOT AT ALL
I HAVE LOOKED FORWARD WITH ENJOYMENT TO THINGS: AS MUCH AS I EVER DID
THINGS HAVE BEEN GETTING ON TOP OF ME: NO, I HAVE BEEN COPING AS WELL AS EVER
I HAVE BEEN ABLE TO LAUGH AND SEE THE FUNNY SIDE OF THINGS: AS MUCH AS I ALWAYS COULD
I HAVE BEEN SO UNHAPPY THAT I HAVE BEEN CRYING: ONLY OCCASIONALLY

## 2019-12-27 NOTE — ANESTHESIA POSTPROCEDURE EVALUATION
Patient: Rain Rosado    Procedure Summary     Date:  19 Room / Location:  [facility] OR 02 / LABOR AND DELIVERY    Anesthesia Start:   Anesthesia Stop:      Procedure:   SECTION, REPEAT (Abdomen) Diagnosis:        delivery delivered      (PRIOR  SECTION, HIGH BLOOD PRESSURE)    Surgeon:  Corinne E Capurro, M.D. Responsible Provider:  Beto Lovell M.D.    Anesthesia Type:  spinal ASA Status:  3 - Emergent          Final Anesthesia Type: spinal  Last vitals  BP   Blood Pressure: 135/95    Temp        Pulse   Pulse: (!) 108   Resp        SpO2          Anesthesia Post Evaluation    Patient location during evaluation: PACU  Patient participation: complete - patient participated  Level of consciousness: awake and alert  Pain score: 0    Airway patency: patent  Anesthetic complications: no  Cardiovascular status: hemodynamically stable  Respiratory status: acceptable  Hydration status: euvolemic    PONV: none

## 2019-12-27 NOTE — PROGRESS NOTES
38.1 HX C/S  PT presenting with CO UC's that are painful.  SVE closed.    1700 Report to MD, orders to repeat SVE in 1 hour.    1800 Report to Shola, discussed elevated BP's, PIH labs ordered and C/S will be done.     1850 Report to PETRA

## 2019-12-27 NOTE — PROGRESS NOTES
1855 Report received from LEOPOLDO Lawrence RN, POC disucssed, assumed care    1909 Dr Scott at bedside to discuss procedure with pt    1914 Dr Lovell at bedside to discuss procedure with pt    1925 Lab phoned due to missing lab values, verified orders, labs in process    1935 Dr Lovell notified of missing lab values, OK to go back to OR    1948 pt out of preop to OR 1    1950 Pt in OR 1 2018 viable baby boy born via  , APGARs 8/9    2047 pt out of OR to PACU bed 2 in stable condition. Mother and infant bonding.    2155 pt transferred to PPU with baby in arms, both in stable condition. Bedside report given to PPU RN, assumed care. POC discussed.

## 2019-12-27 NOTE — ANESTHESIA PROCEDURE NOTES
Spinal Block  Date/Time: 12/26/2019 7:52 PM  Performed by: Beto Lovell M.D.  Authorized by: Beto Lovell M.D.     Patient Location:  OB  Start Time:  12/26/2019 7:52 PM  End Time:  12/26/2019 7:53 PM  Reason for Block: primary anesthetic    patient identified, IV checked, site marked, risks and benefits discussed, surgical consent, monitors and equipment checked, pre-op evaluation and timeout performed    Patient Position:  Sitting  Prep: ChloraPrep, patient draped and sterile technique    Monitoring:  Blood pressure, continuous pulse oximetry and heart rate  Approach:  Midline  Location:  L4-5  Injection Technique:  Single-shot  Skin infiltration:  Lidocaine  Strength:  1%  Dose:  3ml  Needle Type:  Pencan  Needle Gauge:  25 G  CSF flowing pre/post injection:  Yes  Sensory Level:  T4

## 2019-12-27 NOTE — ANESTHESIA QCDR
2019 [facility] Clinical Data Registry (for Quality Improvement)     Postoperative nausea/vomiting risk protocol (Adult = 18 yrs and Pediatric 3-17 yrs)- (430 and 463)  General inhalation anesthetic (NOT TIVA) with PONV risk factors: No  Provision of anti-emetic therapy with at least 2 different classes of agents: N/A  Patient DID NOT receive anti-emetic therapy and reason is documented in Medical Record: N/A    Multimodal Pain Management- (AQI59)  Patient undergoing Elective Surgery (i.e. Outpatient, or ASC, or Prescheduled Surgery prior to Hospital Admission): No  Use of Multimodal Pain Management, two or more drugs and/or interventions, NOT including systemic opioids: N/A  Exception: Documented allergy to multiple classes of analgesics: N/A    PACU assessment of acute postoperative pain prior to Anesthesia Care End- Applies to Patients Age = 18- (ABG7)  Initial PACU pain score is which of the following: < 7/10  Patient unable to report pain score: N/A    Post-anesthetic transfer of care checklist/protocol to PACU/ICU- (426 and 427)  Upon conclusion of case, patient transferred to which of the following locations: PACU/Non-ICU  Use of transfer checklist/protocol: Yes  Exclusion: Service Performed in Patient Hospital Room (and thus did not require transfer): N/A    PACU Reintubation- (AQI31)  General anesthesia requiring endotracheal intubation (ETT) along with subsequent extubation in OR or PACU: No  Required reintubation in the PACU: N/A  Extubation was a planned trial documented in the medical record prior to removal of the original airway device: N/A    Unplanned admission to ICU related to anesthesia service up through end of PACU care- (MD51)  Unplanned admission to ICU (not initially anticipated at anesthesia start time): No

## 2019-12-27 NOTE — CARE PLAN
Problem: Altered physiologic condition related to postoperative  delivery  Goal: Patient physiologically stable as evidenced by normal lochia, palpable uterine involution and vital signs within normal limits  Outcome: PROGRESSING AS EXPECTED  Note:   Fundus firm and lochia scant.      Problem: Pain Management  Goal: Pain level will decrease to patient's comfort goal  Outcome: PROGRESSING AS EXPECTED  Note:   Pt request pain meds as needed, scheduled meds will be administered.

## 2019-12-27 NOTE — CARE PLAN
Problem: Communication  Goal: The ability to communicate needs accurately and effectively will improve  Outcome: PROGRESSING AS EXPECTED  Note:   Reviewed plan of care with patient who verbalized understanding.      Problem: Altered physiologic condition related to postoperative  delivery  Goal: Patient physiologically stable as evidenced by normal lochia, palpable uterine involution and vital signs within normal limits  Outcome: PROGRESSING AS EXPECTED  Note:   Fundus firm.  Lochia scant.  VSS.      Problem: Potential for postpartum infection related to surgical incision, compromised uterine condition, urinary tract or respiratory compromise  Goal: Patient will be afebrile and free from signs and symptoms of infection  Outcome: PROGRESSING AS EXPECTED  Note:   Patient is afebrile.

## 2019-12-27 NOTE — ANESTHESIA PREPROCEDURE EVALUATION
with two prior CS. In active labor. Elevated blood pressures concerning for pre eclampsia.     Relevant Problems   No relevant active problems       Physical Exam    Airway   Mallampati: II  TM distance: >3 FB  Neck ROM: full       Cardiovascular - normal exam  Rhythm: regular  Rate: normal  (-) murmur     Dental - normal exam         Pulmonary - normal exam  Breath sounds clear to auscultation     Abdominal    Neurological - normal exam                 Anesthesia Plan    ASA 3- EMERGENT   ASA physical status 3 criteria: hypertension - poorly controlledASA physical status emergent criteria: non-reassuring fetal heart tones    Plan - spinal   Neuraxial block will be primary anesthetic            Postoperative Plan: Postoperative administration of opioids is intended.    Pertinent diagnostic labs and testing reviewed    Informed Consent:    Anesthetic plan and risks discussed with patient.

## 2019-12-27 NOTE — PROGRESS NOTES
"0710- Bedside report received from LILIANA Craft.  Patient denied needs.  Patient assessment done.  Patient stated that she is passing flatus.  Patient denied dizziness.  Per report- patient has ambulated to the bathroom prior to change of shift.  Discussed pain management plan.  Patient informed of MD order fors tylenol and toradol.  Patient prefers to call for further pain medication as needed.  Reviewed plan of care.  Patient instructed to ambulate in hallways.  Patient instructed to use incentive spirometer hourly.  Patient verbalized understanding.  Patient's mother at bedside.  1130- Patient up to bathroom with standby assist.  Patient ambulated with steady gait.  Deborah-care and catheter care done.  Patient reported feeling \"a little lightheaded\".  Patient back to bed after hand hygiene done.  0849- Patient denied dizziness.  Patient reported feeling \"good after nap\".  Indwelling catheter discontinued.  "

## 2019-12-27 NOTE — H&P
CHIEF COMPLAINT:  Uterine contractions.    HISTORY OF PRESENT ILLNESS:  The patient  is a patient of mine who is a   24-year-old  3, para 2 at 38-1/7 weeks' gestational age.  She came into   the hospital with complaints of irregular uterine contractions and was found   to have mildly elevated blood pressures of 133/85 and 135/95.  She is a prior    x2 and was scheduled for a repeat  section next week.  Given   her mildly elevated blood pressures, I have decided to go ahead and proceed   with a repeat  section tonight.  Pregnancy has been uncomplicated.    PAST MEDICAL HISTORY:  Negative.    PAST SURGICAL HISTORY:  Includes 2 prior  sections.    ALLERGIES:  Patient has no known drug allergies.    CURRENT MEDICATIONS:  Prenatal vitamins.    LABORATORY DATA:  Show a blood type of A positive, rubella immune, her group B   strep culture is unknown at this time.    PHYSICAL EXAMINATION:  VITAL SIGNS:  Blood pressures as stated above.  CARDIOVASCULAR:  Regular rate and rhythm.  LUNGS:  Clear.  ABDOMEN:  Gravid.  PELVIC:  Vaginal exam has been deferred.  EXTREMITIES:  Show no clubbing, cyanosis or edema.    ASSESSMENT AND PLAN:  1.  This is a 24-year-old female,  3, para 2 at 38-1/7 weeks'   gestational age.  2.  Mildly elevated blood pressures.  3.  History of prior  section x2, will undergo repeat.  Plan is to go   ahead and proceed with a repeat  section.  I have discussed with her   risks of  to include pain, bleeding, infection, damage to internal   organs, anesthetic risks, HIV and hepatitis in the event that a transfusion is   necessary.  She understands all of the above risk factors and her questions   have been answered.  She has signed the proper consent forms.       ____________________________________     Corinne E. Capurro, MD [facility] / [facility]    DD:  2019 19:12:09  DT:  2019 19:57:19    D#:  [id number]  Job#:  [id number]

## 2019-12-27 NOTE — PROGRESS NOTES
Pt. Arrived from floor via gurney, received report from LILIANA Win. Assessment complete VSS. Pt oriented to room, call light, emergency light, bulb syringe, and placing baby on back to sleep. Call light within reach. Pt. Requested pain medication as needed, will continue to monitor.     0257- administered benadryl, pt c/o itching.

## 2019-12-27 NOTE — PROGRESS NOTES
POD#1  S/feels ok, pain controlled, has been up to BR, baby has latched; was a little dizzy when she got up but went away quickly  O/  Vitals:    12/27/19 0200 12/27/19 0300 12/27/19 0400 12/27/19 0500   BP: 114/77      Pulse: 98 88 77 91   Resp: 18 16 16 16   Temp: 36.5 °C (97.7 °F)      TempSrc: Temporal      SpO2: 96% 97% 96% 97%   Weight:       Height:         uop about 20cc/hr overnight    Recent Labs     12/26/19  1850 12/27/19  0400   WBC 8.4 10.3   RBC 4.38 3.41*   HEMOGLOBIN 12.0 9.4*   HEMATOCRIT 36.5* 28.7*   MCV 83.3 84.2   MCH 27.4 27.6   RDW 40.0 41.1   PLATELETCT 230 218   MPV 11.7 11.3   NEUTSPOLYS 75.60*  --    LYMPHOCYTES 18.90*  --    MONOCYTES 5.10  --    EOSINOPHILS 0.10  --    BASOPHILS 0.10  --      GEn-sitting up in bed holding baby  Abd-soft, ff at umb  Inc-dressing dry  ext-nt calves    A&P/POD#1 s/p repeat c/section #3 at 38 weeks for mild PIH  Low uop overnight, will give LR bolus and check vitals; overall looks good  If responds well, routine care

## 2019-12-27 NOTE — ANESTHESIA TIME REPORT
Anesthesia Start and Stop Event Times     Date Time Event    2019 1930 Ready for Procedure      Anesthesia Start      Anesthesia Stop        Responsible Staff  19    Name Role Begin End    Beto Lovell M.D. Anesth 1931        Preop Diagnosis (Free Text):  Pre-op Diagnosis     PRIOR  SECTION, HIGH BLOOD PRESSURE        Preop Diagnosis (Codes):  Diagnosis Information     Diagnosis Code(s):  delivery delivered [O82]        Post op Diagnosis  Pregnancy      Premium Reason  A. 3PM - 7AM    Comments:

## 2019-12-28 PROBLEM — G89.18 ACUTE POST-OPERATIVE PAIN: Status: ACTIVE | Noted: 2019-12-28

## 2019-12-28 PROBLEM — O14.93 PRE-ECLAMPSIA IN THIRD TRIMESTER: Status: ACTIVE | Noted: 2019-12-28

## 2019-12-28 PROBLEM — Z98.891 S/P CESAREAN SECTION: Status: ACTIVE | Noted: 2019-12-28

## 2019-12-28 PROCEDURE — 700112 HCHG RX REV CODE 229: Performed by: OBSTETRICS & GYNECOLOGY

## 2019-12-28 PROCEDURE — A9270 NON-COVERED ITEM OR SERVICE: HCPCS | Performed by: OBSTETRICS & GYNECOLOGY

## 2019-12-28 PROCEDURE — 770002 HCHG ROOM/CARE - OB PRIVATE (112)

## 2019-12-28 PROCEDURE — 700102 HCHG RX REV CODE 250 W/ 637 OVERRIDE(OP): Performed by: OBSTETRICS & GYNECOLOGY

## 2019-12-28 RX ORDER — IBUPROFEN 600 MG/1
600 TABLET ORAL EVERY 6 HOURS PRN
Qty: 60 TAB | Refills: 0 | Status: SHIPPED | OUTPATIENT
Start: 2019-12-28 | End: 2019-12-29

## 2019-12-28 RX ORDER — OXYCODONE HYDROCHLORIDE AND ACETAMINOPHEN 5; 325 MG/1; MG/1
1 TABLET ORAL EVERY 4 HOURS PRN
Status: DISCONTINUED | OUTPATIENT
Start: 2019-12-28 | End: 2019-12-29 | Stop reason: HOSPADM

## 2019-12-28 RX ORDER — OXYCODONE AND ACETAMINOPHEN 10; 325 MG/1; MG/1
1 TABLET ORAL EVERY 6 HOURS PRN
Qty: 28 TAB | Refills: 0 | Status: SHIPPED | OUTPATIENT
Start: 2019-12-28 | End: 2020-01-01

## 2019-12-28 RX ADMIN — IBUPROFEN 600 MG: 600 TABLET ORAL at 05:41

## 2019-12-28 RX ADMIN — IBUPROFEN 600 MG: 600 TABLET ORAL at 12:20

## 2019-12-28 RX ADMIN — IBUPROFEN 600 MG: 600 TABLET ORAL at 18:31

## 2019-12-28 RX ADMIN — DOCUSATE SODIUM 100 MG: 100 CAPSULE, LIQUID FILLED ORAL at 16:57

## 2019-12-28 ASSESSMENT — PATIENT HEALTH QUESTIONNAIRE - PHQ9
SUM OF ALL RESPONSES TO PHQ9 QUESTIONS 1 AND 2: 0
1. LITTLE INTEREST OR PLEASURE IN DOING THINGS: NOT AT ALL
2. FEELING DOWN, DEPRESSED, IRRITABLE, OR HOPELESS: NOT AT ALL

## 2019-12-28 NOTE — PROGRESS NOTES
Assessment complete. Fundus firm, lochia scant. Pain 3/10, medicated per MAR. Patient will call if pain medication is requested. Discussed plan of care for the night. Answered all questions at this time. Call light within reach. Encouraged patient to call with any further questions or concerns.

## 2019-12-28 NOTE — CARE PLAN
Problem: Altered physiologic condition related to postoperative  delivery  Goal: Patient physiologically stable as evidenced by normal lochia, palpable uterine involution and vital signs within normal limits  Outcome: PROGRESSING AS EXPECTED  Note:   Fundus is firm, lochia scant, VSS, will continue to monitor     Problem: Alteration in comfort related to surgical incision and/or after birth pains  Goal: Patient is able to ambulate, care for self and infant with acceptable pain level  Outcome: PROGRESSING AS EXPECTED  Note:   Patient is ambulatory and able to care for self and infant appropriately. States pain is tolerable

## 2019-12-28 NOTE — PROGRESS NOTES
"OB Post Operative Note    Ambulating well. Pain controlled. Normal lochia. Eating and voiding without difficulty. Passing flatus. Denies HA, changes in vision or epigastric/RUQ pain.     Vitals  /69   Pulse 68   Temp 36.7 °C (98.1 °F) (Oral)   Resp 16   Ht 1.727 m (5' 8\")   Wt 108 kg (238 lb)   SpO2 98%   Breastfeeding? No   BMI 36.19 kg/m²     Gen: NAD, resting comfortably  GI: soft, non tender to palpation, incision intact with steri strips in place; some old blood noted along the left corner of the incision  :fundus firm and below umbilicus  Ext: no edema      Recent Labs     /  1850 /  0400   WBC 8.4 10.3   RBC 4.38 3.41*   HEMOGLOBIN 12.0 9.4*   HEMATOCRIT 36.5* 28.7*   MCV 83.3 84.2   MCH 27.4 27.6   RDW 40.0 41.1   PLATELETCT 230 218   MPV 11.7 11.3   NEUTSPOLYS 75.60*  --    LYMPHOCYTES 18.90*  --    MONOCYTES 5.10  --    EOSINOPHILS 0.10  --    BASOPHILS 0.10  --        Assessment:  POD day # 2 s/p RLTCS for preeclampsia without severe features and history of prior  x 2  Doing clinically well    Plan:  Routine post operative care  Discharge home on POD# 3-4 per patient request    Trudy South M.D.      "

## 2019-12-29 VITALS
OXYGEN SATURATION: 96 % | RESPIRATION RATE: 17 BRPM | BODY MASS INDEX: 36.07 KG/M2 | HEART RATE: 94 BPM | SYSTOLIC BLOOD PRESSURE: 117 MMHG | DIASTOLIC BLOOD PRESSURE: 71 MMHG | TEMPERATURE: 98.4 F | WEIGHT: 238 LBS | HEIGHT: 68 IN

## 2019-12-29 PROCEDURE — A9270 NON-COVERED ITEM OR SERVICE: HCPCS | Performed by: OBSTETRICS & GYNECOLOGY

## 2019-12-29 PROCEDURE — 700112 HCHG RX REV CODE 229: Performed by: OBSTETRICS & GYNECOLOGY

## 2019-12-29 PROCEDURE — 700102 HCHG RX REV CODE 250 W/ 637 OVERRIDE(OP): Performed by: OBSTETRICS & GYNECOLOGY

## 2019-12-29 RX ORDER — IBUPROFEN 600 MG/1
600 TABLET ORAL EVERY 6 HOURS PRN
Qty: 60 TAB | Refills: 0 | Status: SHIPPED | OUTPATIENT
Start: 2019-12-29 | End: 2020-03-19

## 2019-12-29 RX ADMIN — IBUPROFEN 600 MG: 600 TABLET ORAL at 06:31

## 2019-12-29 RX ADMIN — IBUPROFEN 600 MG: 600 TABLET ORAL at 00:44

## 2019-12-29 RX ADMIN — DOCUSATE SODIUM 100 MG: 100 CAPSULE, LIQUID FILLED ORAL at 06:31

## 2019-12-29 RX ADMIN — IBUPROFEN 600 MG: 600 TABLET ORAL at 13:23

## 2019-12-29 NOTE — DISCHARGE SUMMARY
DATE OF ADMISSION:  2019    DATE OF DISCHARGE:  2019    DIAGNOSES ON ADMISSION:  1. A 24-year-old  3 at 38 weeks.  2. Irregular contractions.  3. Preeclampsia without severe features.  4. History of  section x2.    PROCEDURE:  Elective repeat  section.    POSTOPERATIVE DIAGNOSES:  1. A 24-year-old  3 at 38 weeks.  2. Irregular contractions.  3. Preeclampsia without severe features.  4. History of  section x2.    HOSPITAL COURSE:  This is a 24-year-old  3, para 2, who presented at 38   weeks with irregular contractions and history of prior  section.  She   was incidentally noted to have preeclampsia without severe features.  She had   a repeat  section, completed on 2019 without complication.    Postoperatively, she has done well.  She has remained afebrile with stable   vital signs.  Her blood pressures on the day of discharge have ranged from   117-120 over 69-80.  She is not on any blood pressure medication.  She denies   preeclamptic symptoms.  Her incision has suture in place with small amount of   dried blood present.  She is ambulating and voiding without difficulty and is   ready to be discharged to home on postoperative day #3.    She is Rh positive, rubella immune, GBS unknown.  Her pre-delivery hemoglobin   was 12.0, post-delivery hemoglobin is 9.4.    DISCHARGE MEDICATIONS:  1. Ibuprofen.  2. Patient declines any narcotic pain medications and states that ibuprofen is   adequate.    DISCHARGE INSTRUCTIONS:  1. The patient is given routine precautions for bleeding, pain, infection,   VTE, difficulty breastfeeding, postpartum depression.  2. Incisional care and activity limitations have been reviewed with her.  3. Preeclamptic precautions have been reviewed with her.  4. Patient will follow up with Dr. Scott in one week for blood pressure and   incision check.             ____________________________________     YOUSIF POWELL,  MD TORRES / SHIREEN    DD:  12/29/2019 10:48:36  DT:  12/29/2019 11:54:32    D#:  [id number]  Job#:  [id number]

## 2019-12-29 NOTE — PROGRESS NOTES
1900-- Received report from LILIANA Gutierrez, Infant at bedside in open crib no signs of distress.  Pt resting in bed. Discussed pain management for the day.  No further needs at the time.  Call light within reach, bed locked and in lowest position.  Rounding in place.    1915-- Assessment completed, VSS, Discussed plan of care for the night that pt is comfortable with.  All questions answered at this time. Will continue to monitor.

## 2019-12-29 NOTE — PROGRESS NOTES
0703- Bedside report received from LILIANA Mcnally.  Patient denied needs.  0900- Patient assessment done.  Patient stated that she is voiding without difficulty and passing flatus.  Patient denied dizziness and stated that she is walking without difficulty.  Discussed pain management plan and patient prefers to call for pain intervention as needed.  Reviewed plan of care.  Patient instructed to ambulate in hallways.  Patient verbalized understanding.  1440- Patient ambulated hallways with steady gait.  Patient tolerated well.  3655- Report given to LILIANA King, who assumed care of patient.

## 2019-12-29 NOTE — PROGRESS NOTES
"POD#3    S: pain controlled with motrin alone. +flatus and BM. Lochia normal. +void. Denies HA/RUQ pain/scotoma    O: /71   Pulse 94   Temp 36.9 °C (98.4 °F) (Temporal)   Resp 17   Ht 1.727 m (5' 8\")   Wt 108 kg (238 lb)   SpO2 96%   BP Range: 117-120/69-80  Gen: NAD  Fundus firm below umbilicus  Incision: small dry blood present   Ext; no c/c/e    Labs: none new.     A/P 23yo  s/p ERCS, PreE without severe features  1. Routine post op care  2. PreE: appears resolved. No BP meds have been required. Precautions given  3. Home today   "

## 2019-12-29 NOTE — DISCHARGE INSTRUCTIONS
POSTPARTUM DISCHARGE INSTRUCTIONS FOR MOM    YOB: 1995   Age: 24 y.o.               Admit Date: 2019     Discharge Date: 2019  Attending Doctor:  Corinne E Capurro, M.D.                  Allergies:  Patient has no known allergies.    Discharged to home by car. Discharged via wheelchair, hospital escort: Yes.  Special equipment needed: Not Applicable and None  Belongings with: Personal  Be sure to schedule a follow-up appointment with your primary care doctor or any specialists as instructed.     Discharge Plan:   Diet Plan: Discussed  Activity Level: Discussed  Confirmed Follow up Appointment: Patient to Call and Schedule Appointment  Confirmed Symptoms Management: Discussed  Medication Reconciliation Updated: Yes  Influenza Vaccine Indication: Patient Refuses    REASONS TO CALL YOUR OBSTETRICIAN:  1.   Persistent fever or shaking chills (Temperature higher than 100.4)  2.   Heavy bleeding (soaking more than 1 pad per hour); Passing clots  3.   Foul odor from vagina  4.   Mastitis (Breast infection; breast pain, chills, fever, redness)  5.   Urinary pain, burning or frequency  6.   Episiotomy infection  7.   Abdominal incision infection  8.   Severe depression longer than 24 hours    HAND WASHING  · Prior to handling the baby.  · Before breastfeeding or bottle feeding baby.  · After using the bathroom or changing the baby's diaper.    WOUND CARE  Ask your physician for additional care instructions.  In general:    ·  Incision:      · Keep clean and dry.    · Do NOT lift anything heavier than your baby for up to 6 weeks.    · There should not be any opening or pus.      VAGINAL CARE  · Nothing inside vagina for 6 weeks: no sexual intercourse, tampons or douching.  · Bleeding may continue for 2-4 weeks.  Amount may vary.    · Call your physician for heavy bleeding which means soaking more than 1 pad per hour    BIRTH CONTROL  · It is possible to become pregnant at any time after delivery  "and while breastfeeding.  · Plan to discuss a method of birth control with your physician at your follow up visit. visit.    DIET AND ELIMINATION  · Eating more fiber (bran cereal, fruits, and vegetables) and drinking plenty of fluids will help to avoid constipation.  · Urinary frequency after childbirth is normal.    POSTPARTUM BLUES  During the first few days after birth, you may experience a sense of the \"blues\" which may include impatience, irritability or even crying.  These feeling come and go quickly.  However, as many as 1 in 10 women experience emotional symptoms known as postpartum depression.    Postpartum depression:  May start as early as the second or third day after delivery or take several weeks or months to develop.  Symptoms of \"blues\" are present, but are more intense:  Crying spells; loss of appetite; feelings of hopelessness or loss of control; fear of touching the baby; over concern or no concern at all about the baby; little or no concern about your own appearance/caring for yourself; and/or inability to sleep or excessive sleeping.  Contact your physician if you are experiencing any of these symptoms.    Crisis Hotline:  · [place] Crisis Hotline:  8-778-DYRJNYH  Or 1-880.238.4751  · [place] Crisis Hotline:  1-232.396.6671  Or 663-032-2418    PREVENTING SHAKEN BABY:  If you are angry or stressed, PUT THE BABY IN THE CRIB, step away, take some deep breaths, and wait until you are calm to care for the baby.  DO NOT SHAKE THE BABY.  You are not alone, call a supporter for help.    · Crisis Call Center 24/7 crisis line 976-476-1059 or 1-856.379.5382  · You can also text them, text \"ANSWER\" to 788186    QUIT SMOKING/TOBACCO USE:  I understand the use of any tobacco products increases my chance of suffering from future heart disease and could cause other illnesses which may shorten my life. Quitting the use of tobacco products is the single most important thing I can do to improve my health. For " further information on smoking / tobacco cessation call a Toll Free Quit Line at 1-159.322.7333 (*[employer] [place]) or 1-156.582.8390 (American Lung Association) or you can access the web based program at www.lungusa.org.    · [place] Tobacco Users Help Line:  (921) 530-3117       Toll Free: 1-290.952.6448  · Quit Tobacco Program [facility] (733)572-7833    DEPRESSION / SUICIDE RISK:  As you are discharged from this [facility], it is important to learn how to keep safe from harming yourself.    Recognize the warning signs:  · Abrupt changes in personality, positive or negative- including increase in energy   · Giving away possessions  · Change in eating patterns- significant weight changes-  positive or negative  · Change in sleeping patterns- unable to sleep or sleeping all the time   · Unwillingness or inability to communicate  · Depression  · Unusual sadness, discouragement and loneliness  · Talk of wanting to die  · Neglect of personal appearance   · Rebelliousness- reckless behavior  · Withdrawal from people/activities they love  · Confusion- inability to concentrate     If you or a loved one observes any of these behaviors or has concerns about self-harm, here's what you can do:  · Talk about it- your feelings and reasons for harming yourself  · Remove any means that you might use to hurt yourself (examples: pills, rope, extension cords, firearm)  · Get professional help from the community (Mental Health, Substance Abuse, psychological counseling)  · Do not be alone:Call your Safe Contact- someone whom you trust who will be there for you.  · Call your local CRISIS HOTLINE 302-7802 or 084-068-6308  · Call your local Children's Mobile Crisis Response Team [place] (506) 333-2817 or www.[employer]World  · Call the toll free National Suicide Prevention Hotlines   · National Suicide Prevention Lifeline 254-564-IBRI (7770)  · National Hope Line Network 800-SUICIDE  (938-1019)    DISCHARGE SURVEY:  Thank you for choosing [facility].  We hope we provided you with very good care.  You may be receiving a survey in the mail.  Please fill it out.  Your opinion is valuable to us.    ADDITIONAL EDUCATIONAL MATERIALS GIVEN TO PATIENT:        My signature on this form indicates that:  1.  I have reviewed and understand the above information  2.  My questions regarding this information have been answered to my satisfaction.  3.  I have formulated a plan with my discharge nurse to obtain my prescribed medication for home.

## 2019-12-29 NOTE — CARE PLAN
Problem: Communication  Goal: The ability to communicate needs accurately and effectively will improve  Outcome: PROGRESSING AS EXPECTED  Note:   Reviewed plan of care with patient who verbalized understanding.      Problem: Altered physiologic condition related to postoperative  delivery  Goal: Patient physiologically stable as evidenced by normal lochia, palpable uterine involution and vital signs within normal limits  Outcome: PROGRESSING AS EXPECTED  Note:   Fundus firm.  Lochia scant.  VSS.      Problem: Potential for postpartum infection related to surgical incision, compromised uterine condition, urinary tract or respiratory compromise  Goal: Patient will be afebrile and free from signs and symptoms of infection  Outcome: PROGRESSING AS EXPECTED  Note:   Patient is afebrile.  Incision approximated.  No redness noted.

## 2019-12-30 NOTE — OP REPORT
DATE OF SERVICE:  2019    PREOPERATIVE DIAGNOSES:  1.  Intrauterine pregnancy at 38-1/7 weeks.  2.  History of prior  section x2 to undergo repeat.  3.  Labor.    POSTOPERATIVE DIAGNOSES:  1.  Intrauterine pregnancy at 38-1/7 weeks.  2.  History of prior  section x2 to undergo repeat.  3.  Labor.    PROCEDURE:  Repeat low transverse cervical  section.    PRIMARY SURGEON:Corinne E. Capurro, MD    ASSISTANT:  Dr. Jolley.    ANESTHESIA:  Spinal anesthesia.    COMPLICATIONS:  None.    PATHOLOGY:  None.    TOTAL ESTIMATED BLOOD LOSS:  Less than 600 mL.     FINDINGS:  A viable infant with Apgars of 8 and 9, normal uterus, bilateral   fallopian tubes and ovaries.      DESCRIPTION OF PROCEDURE:  After the patient was taken to the operating room   and her spinal anesthesia was found to be adequate, she was then prepped and   draped in usual sterile position.  A repeat Pfannenstiel skin incision was   then made with the knife.  This incision was carried down through to the   underlying fascia using sharp dissection and Bovie cautery.  Fascia was   incised and extended laterally using Aceves scissors.  The rectus fascia was   dissected off the underlying rectus muscle using sharp dissection and Bovie   cautery.  Peritoneum was tented up and entered sharply using Metzenbaum   scissors.  This incision was extended superiorly and inferiorly with good   visualization of the bladder.  Bladder blade was then inserted.  Vesicouterine   peritoneum was tented up and entered sharply using Metzenbaum scissors.  This   incision was extended laterally and a bladder flap was created digitally.    Lower uterine segment was then incised with the knife.  This incision was   extended using finger fracture.  Infant's head was delivered atraumatically.    Nose and mouth bulb suctioned.  The rest of infant delivered through   uncomplicated.  Cord was clamped x2 and cut and infant was handed over to   waiting nursing  staff.  Manual removal of intact placenta with 3-vessel cord.    The uterus was then repaired intra-abdominal.  Lower uterine segment was   repaired in a running fashion using 0 Vicryl.  A second imbricating suture of   0 Vicryl was then performed and hemostasis was assured.  The abdominal gutters   were then cleared of all clot and debris.  Peritoneum was repaired in a   running fashion using 3-0 Vicryl.  Fascia was repaired in a running fashion   using 0 Vicryl.  Subcutaneous tissues were copiously irrigated and hemostasis   assured once again.  These tissues were then reapproximated using 3-0 Vicryl.    The skin was then closed with absorbable staples.  Patient returned to   recovery in stable condition.  Sponge, lap and needle counts were correct at   the end of the procedure.       ____________________________________     Corinne E. Capurro, MD [facility] / [facility]    DD:  12/30/2019 07:45:04  DT:  12/30/2019 07:55:45    D#:  [id number]  Job#:  [id number]

## 2019-12-30 NOTE — CARE PLAN
Problem: Potential for postpartum infection related to surgical incision, compromised uterine condition, urinary tract or respiratory compromise  Goal: Patient will be afebrile and free from signs and symptoms of infection  Outcome: PROGRESSING AS EXPECTED  Note:   VSS. No s/s of infection     Problem: Alteration in comfort related to surgical incision and/or after birth pains  Goal: Patient verbalizes acceptable pain level  Outcome: PROGRESSING AS EXPECTED  Note:   Denies pain at this time. Encourages non pain med interventions such as ice packs

## 2019-12-30 NOTE — PROGRESS NOTES
0800 Assessment completed, fundus firm, lochia light, ABD incision with steri strips intact, POC reviewed, verbalized understanding. Denies pain at this time, will call if pain med intervention needed.     1400 Discharge instructions reviewed, verbalized understanding, paper signed, prescribed medications scripts given, reviewed, verbalized understanding. Old Steri stips was replaced with new ones per MD order    1516 mom left facility, escorted by nursing staff

## 2020-01-01 ENCOUNTER — HOSPITAL ENCOUNTER (EMERGENCY)
Facility: MEDICAL CENTER | Age: 25
End: 2020-01-01
Attending: EMERGENCY MEDICINE
Payer: MEDICAID

## 2020-01-01 VITALS
HEART RATE: 97 BPM | TEMPERATURE: 97.8 F | HEIGHT: 68 IN | RESPIRATION RATE: 14 BRPM | WEIGHT: 225.09 LBS | DIASTOLIC BLOOD PRESSURE: 70 MMHG | OXYGEN SATURATION: 97 % | BODY MASS INDEX: 34.11 KG/M2 | SYSTOLIC BLOOD PRESSURE: 112 MMHG

## 2020-01-01 DIAGNOSIS — R51.9 ACUTE NONINTRACTABLE HEADACHE, UNSPECIFIED HEADACHE TYPE: ICD-10-CM

## 2020-01-01 DIAGNOSIS — Z98.891 S/P CESAREAN SECTION: ICD-10-CM

## 2020-01-01 DIAGNOSIS — N10 ACUTE PYELONEPHRITIS: ICD-10-CM

## 2020-01-01 DIAGNOSIS — E86.0 DEHYDRATION: ICD-10-CM

## 2020-01-01 LAB
ALBUMIN SERPL BCP-MCNC: 2.8 G/DL (ref 3.2–4.9)
ALBUMIN/GLOB SERPL: 0.8 G/DL
ALP SERPL-CCNC: 84 U/L (ref 30–99)
ALT SERPL-CCNC: 14 U/L (ref 2–50)
ANION GAP SERPL CALC-SCNC: 10 MMOL/L (ref 0–11.9)
APPEARANCE UR: ABNORMAL
AST SERPL-CCNC: 14 U/L (ref 12–45)
BACTERIA #/AREA URNS HPF: NEGATIVE /HPF
BACTERIA GENITAL QL WET PREP: NORMAL
BASOPHILS # BLD AUTO: 0.1 % (ref 0–1.8)
BASOPHILS # BLD: 0.01 K/UL (ref 0–0.12)
BILIRUB SERPL-MCNC: 0.6 MG/DL (ref 0.1–1.5)
BILIRUB UR QL STRIP.AUTO: ABNORMAL
BUN SERPL-MCNC: 15 MG/DL (ref 8–22)
CALCIUM SERPL-MCNC: 8.2 MG/DL (ref 8.5–10.5)
CHLORIDE SERPL-SCNC: 112 MMOL/L (ref 96–112)
CO2 SERPL-SCNC: 20 MMOL/L (ref 20–33)
COLOR UR: ABNORMAL
CREAT SERPL-MCNC: 0.87 MG/DL (ref 0.5–1.4)
EOSINOPHIL # BLD AUTO: 0.05 K/UL (ref 0–0.51)
EOSINOPHIL NFR BLD: 0.6 % (ref 0–6.9)
EPI CELLS #/AREA URNS HPF: ABNORMAL /HPF
ERYTHROCYTE [DISTWIDTH] IN BLOOD BY AUTOMATED COUNT: 43.8 FL (ref 35.9–50)
GLOBULIN SER CALC-MCNC: 3.7 G/DL (ref 1.9–3.5)
GLUCOSE SERPL-MCNC: 90 MG/DL (ref 65–99)
GLUCOSE UR STRIP.AUTO-MCNC: NEGATIVE MG/DL
HCT VFR BLD AUTO: 27.4 % (ref 37–47)
HGB BLD-MCNC: 8.6 G/DL (ref 12–16)
HYALINE CASTS #/AREA URNS LPF: ABNORMAL /LPF
IMM GRANULOCYTES # BLD AUTO: 0.03 K/UL (ref 0–0.11)
IMM GRANULOCYTES NFR BLD AUTO: 0.3 % (ref 0–0.9)
KETONES UR STRIP.AUTO-MCNC: NEGATIVE MG/DL
LEUKOCYTE ESTERASE UR QL STRIP.AUTO: ABNORMAL
LIPASE SERPL-CCNC: 10 U/L (ref 11–82)
LYMPHOCYTES # BLD AUTO: 1.65 K/UL (ref 1–4.8)
LYMPHOCYTES NFR BLD: 18.7 % (ref 22–41)
MCH RBC QN AUTO: 27.5 PG (ref 27–33)
MCHC RBC AUTO-ENTMCNC: 31.4 G/DL (ref 33.6–35)
MCV RBC AUTO: 87.5 FL (ref 81.4–97.8)
MICRO URNS: ABNORMAL
MONOCYTES # BLD AUTO: 0.56 K/UL (ref 0–0.85)
MONOCYTES NFR BLD AUTO: 6.4 % (ref 0–13.4)
NEUTROPHILS # BLD AUTO: 6.51 K/UL (ref 2–7.15)
NEUTROPHILS NFR BLD: 73.9 % (ref 44–72)
NITRITE UR QL STRIP.AUTO: NEGATIVE
NRBC # BLD AUTO: 0 K/UL
NRBC BLD-RTO: 0 /100 WBC
PH UR STRIP.AUTO: 7.5 [PH] (ref 5–8)
PLATELET # BLD AUTO: 264 K/UL (ref 164–446)
PMV BLD AUTO: 10.5 FL (ref 9–12.9)
POTASSIUM SERPL-SCNC: 3.8 MMOL/L (ref 3.6–5.5)
PROT SERPL-MCNC: 6.5 G/DL (ref 6–8.2)
PROT UR QL STRIP: 300 MG/DL
RBC # BLD AUTO: 3.13 M/UL (ref 4.2–5.4)
RBC # URNS HPF: ABNORMAL /HPF
RBC UR QL AUTO: ABNORMAL
SIGNIFICANT IND 70042: NORMAL
SITE SITE: NORMAL
SODIUM SERPL-SCNC: 142 MMOL/L (ref 135–145)
SOURCE SOURCE: NORMAL
SP GR UR STRIP.AUTO: 1.01
UROBILINOGEN UR STRIP.AUTO-MCNC: 0.2 MG/DL
WBC # BLD AUTO: 8.8 K/UL (ref 4.8–10.8)
WBC #/AREA URNS HPF: ABNORMAL /HPF

## 2020-01-01 PROCEDURE — 36415 COLL VENOUS BLD VENIPUNCTURE: CPT

## 2020-01-01 PROCEDURE — 700105 HCHG RX REV CODE 258: Performed by: EMERGENCY MEDICINE

## 2020-01-01 PROCEDURE — 80053 COMPREHEN METABOLIC PANEL: CPT

## 2020-01-01 PROCEDURE — 81001 URINALYSIS AUTO W/SCOPE: CPT

## 2020-01-01 PROCEDURE — A9270 NON-COVERED ITEM OR SERVICE: HCPCS | Performed by: EMERGENCY MEDICINE

## 2020-01-01 PROCEDURE — 85025 COMPLETE CBC W/AUTO DIFF WBC: CPT

## 2020-01-01 PROCEDURE — 87086 URINE CULTURE/COLONY COUNT: CPT

## 2020-01-01 PROCEDURE — 700111 HCHG RX REV CODE 636 W/ 250 OVERRIDE (IP): Performed by: EMERGENCY MEDICINE

## 2020-01-01 PROCEDURE — 96375 TX/PRO/DX INJ NEW DRUG ADDON: CPT

## 2020-01-01 PROCEDURE — 96374 THER/PROPH/DIAG INJ IV PUSH: CPT

## 2020-01-01 PROCEDURE — 99285 EMERGENCY DEPT VISIT HI MDM: CPT

## 2020-01-01 PROCEDURE — 700102 HCHG RX REV CODE 250 W/ 637 OVERRIDE(OP): Performed by: EMERGENCY MEDICINE

## 2020-01-01 PROCEDURE — 83690 ASSAY OF LIPASE: CPT

## 2020-01-01 RX ORDER — DIPHENHYDRAMINE HYDROCHLORIDE 50 MG/ML
25 INJECTION INTRAMUSCULAR; INTRAVENOUS ONCE
Status: COMPLETED | OUTPATIENT
Start: 2020-01-01 | End: 2020-01-01

## 2020-01-01 RX ORDER — ONDANSETRON 4 MG/1
4 TABLET, ORALLY DISINTEGRATING ORAL EVERY 8 HOURS PRN
Qty: 9 TAB | Refills: 0 | Status: SHIPPED | OUTPATIENT
Start: 2020-01-01 | End: 2020-01-04

## 2020-01-01 RX ORDER — CEFDINIR 300 MG/1
300 CAPSULE ORAL 2 TIMES DAILY
Qty: 20 CAP | Refills: 0 | Status: SHIPPED | OUTPATIENT
Start: 2020-01-01 | End: 2020-03-01

## 2020-01-01 RX ORDER — CEFDINIR 300 MG/1
300 CAPSULE ORAL ONCE
Status: COMPLETED | OUTPATIENT
Start: 2020-01-01 | End: 2020-01-01

## 2020-01-01 RX ORDER — SODIUM CHLORIDE, SODIUM LACTATE, POTASSIUM CHLORIDE, CALCIUM CHLORIDE 600; 310; 30; 20 MG/100ML; MG/100ML; MG/100ML; MG/100ML
1000 INJECTION, SOLUTION INTRAVENOUS ONCE
Status: COMPLETED | OUTPATIENT
Start: 2020-01-01 | End: 2020-01-01

## 2020-01-01 RX ORDER — METOCLOPRAMIDE HYDROCHLORIDE 5 MG/ML
10 INJECTION INTRAMUSCULAR; INTRAVENOUS ONCE
Status: COMPLETED | OUTPATIENT
Start: 2020-01-01 | End: 2020-01-01

## 2020-01-01 RX ADMIN — CEFDINIR 300 MG: 300 CAPSULE ORAL at 15:11

## 2020-01-01 RX ADMIN — METOCLOPRAMIDE 10 MG: 5 INJECTION, SOLUTION INTRAMUSCULAR; INTRAVENOUS at 14:25

## 2020-01-01 RX ADMIN — DIPHENHYDRAMINE HYDROCHLORIDE 25 MG: 50 INJECTION INTRAMUSCULAR; INTRAVENOUS at 14:25

## 2020-01-01 RX ADMIN — SODIUM CHLORIDE, POTASSIUM CHLORIDE, SODIUM LACTATE AND CALCIUM CHLORIDE 1000 ML: 600; 310; 30; 20 INJECTION, SOLUTION INTRAVENOUS at 14:23

## 2020-01-01 NOTE — ED PROVIDER NOTES
ED PROVIDER NOTE     Scribed for Tam Box M.D. by Marco Oliveros. 2020, 1:34 PM.    CHIEF COMPLAINT  Chief Complaint   Patient presents with   • Painful Urination     since Monday   • Headache   • Post-Op Complications      on        [facility]    Primary care provider: None.  Means of arrival: Walk-In  History obtained from: Patient  History limited by: None    Rain CHE Rosado is a 24 y.o.  female who presents with for evaluation of mild dysuria onset two days. She noticed the dysuria om Monday and is concerned it may be related to the delivery of her baby. She delivered her baby on 19 via  Section. She was discharged from the hospital on 19. The patient experiences associated headache, nausea, and a fever that has resolved. Negative for vaginal bleeding, vaginal discharge, vomiting, shortness of breath, chest pain, rhinorrhea, congestion, cough, or sore throat. She has been taking ibuprofen for the pain with mild alleviation. The patient has a history of UTIs. Symptoms constant and worsening prompting her to come in for evaluation.     REVIEW OF SYSTEMS  Constitutional: Positive for fever and fatigue but no chills.   HENT: Headache. Negative for nosebleeds or sore throat.    Eyes: Negative for vision changes or redness.   Respiratory: Negative for cough or shortness of breath.    Cardiovascular: Negative for chest pain or palpitations.   Gastrointestinal: Nausea. Negative for vomiting or abdominal pain.   Genitourinary: Dysuria. Negative for flank pain.    Musculoskeletal: Negative for back pain or joint pain.   Skin: Negative for itching or rash.   Psychiatric/Behavioral: Negative for depression or mood changes.   All other systems reviewed and are negative.    PAST MEDICAL HISTORY  Recent c section, occasional UTIs    PAST FAMILY HISTORY  None    SOCIAL HISTORY  Social History     Tobacco Use   • Smoking status: Never Smoker   Substance and Sexual Activity   •  "Alcohol use: No   • Drug use: No   • Sexual activity: None noted       SURGICAL HISTORY   has a past surgical history that includes repeat c section (2019).    CURRENT MEDICATIONS  Home Medications     Reviewed by Ofelia Stroud R.N. (Registered Nurse) on 20 at 1510  Med List Status: Partial   Medication Last Dose Status   ibuprofen (MOTRIN) 600 MG Tab  Active              ALLERGIES  No Known Allergies    PHYSICAL EXAM  VITAL SIGNS: /94   Pulse (!) 101   Temp 36.8 °C (98.2 °F) (Oral)   Resp 14   Ht 1.727 m (5' 8\")   Wt 102.1 kg (225 lb 1.4 oz)   SpO2 98%   BMI 34.22 kg/m²    Pulse ox interpretation: On room air, I interpret this pulse ox as normal.  Constitutional: Well-developed, well-nourished. Lying on stretcher in mild distress.  HEENT: Normocephalic, atraumatic. Posterior pharynx clear, mucous membranes dry.  Eyes:  EOMI. Slightly pale conjunctiva.   Neck: Supple, nontender.  Chest/Pulmonary: Clear to ausculation bilaterally, no wheezes or rhonchi.  Cardiovascular: Tachycardic and regular rhythm, no murmur.   Abdomen:  site well approximated with no erythema or drainage; dry, clean, and intact. Soft, nontender; no rebound, guarding, or masses.  Back: No CVA or midline tenderness.   : Performed with RN geremias Gilbert.   Musculoskeletal: No deformity or edema.  Neuro: Clear speech, normal coordination, cranial nerves II-XII grossly intact, no focal asymmetry or sensory deficits.   Psych: Normal mood and affect.  Skin: No rashes, warm and dry.    DIAGNOSTIC STUDIES / PROCEDURES    LABS & EKG  Results for orders placed or performed during the hospital encounter of 20   CBC WITH DIFFERENTIAL   Result Value Ref Range    WBC 8.8 4.8 - 10.8 K/uL    RBC 3.13 (L) 4.20 - 5.40 M/uL    Hemoglobin 8.6 (L) 12.0 - 16.0 g/dL    Hematocrit 27.4 (L) 37.0 - 47.0 %    MCV 87.5 81.4 - 97.8 fL    MCH 27.5 27.0 - 33.0 pg    MCHC 31.4 (L) 33.6 - 35.0 g/dL    RDW 43.8 35.9 - 50.0 fL    " Platelet Count 264 164 - 446 K/uL    MPV 10.5 9.0 - 12.9 fL    Neutrophils-Polys 73.90 (H) 44.00 - 72.00 %    Lymphocytes 18.70 (L) 22.00 - 41.00 %    Monocytes 6.40 0.00 - 13.40 %    Eosinophils 0.60 0.00 - 6.90 %    Basophils 0.10 0.00 - 1.80 %    Immature Granulocytes 0.30 0.00 - 0.90 %    Nucleated RBC 0.00 /100 WBC    Neutrophils (Absolute) 6.51 2.00 - 7.15 K/uL    Lymphs (Absolute) 1.65 1.00 - 4.80 K/uL    Monos (Absolute) 0.56 0.00 - 0.85 K/uL    Eos (Absolute) 0.05 0.00 - 0.51 K/uL    Baso (Absolute) 0.01 0.00 - 0.12 K/uL    Immature Granulocytes (abs) 0.03 0.00 - 0.11 K/uL    NRBC (Absolute) 0.00 K/uL   COMP METABOLIC PANEL   Result Value Ref Range    Sodium 142 135 - 145 mmol/L    Potassium 3.8 3.6 - 5.5 mmol/L    Chloride 112 96 - 112 mmol/L    Co2 20 20 - 33 mmol/L    Anion Gap 10.0 0.0 - 11.9    Glucose 90 65 - 99 mg/dL    Bun 15 8 - 22 mg/dL    Creatinine 0.87 0.50 - 1.40 mg/dL    Calcium 8.2 (L) 8.5 - 10.5 mg/dL    AST(SGOT) 14 12 - 45 U/L    ALT(SGPT) 14 2 - 50 U/L    Alkaline Phosphatase 84 30 - 99 U/L    Total Bilirubin 0.6 0.1 - 1.5 mg/dL    Albumin 2.8 (L) 3.2 - 4.9 g/dL    Total Protein 6.5 6.0 - 8.2 g/dL    Globulin 3.7 (H) 1.9 - 3.5 g/dL    A-G Ratio 0.8 g/dL   LIPASE   Result Value Ref Range    Lipase 10 (L) 11 - 82 U/L   URINALYSIS CULTURE, IF INDICATED   Result Value Ref Range    Color Red     Character Turbid (A)     Specific Gravity 1.011 <1.035    Ph 7.5 5.0 - 8.0    Glucose Negative Negative mg/dL    Ketones Negative Negative mg/dL    Protein 300 (A) Negative mg/dL    Bilirubin Small (A) Negative    Urobilinogen, Urine 0.2 Negative    Nitrite Negative Negative    Leukocyte Esterase Large (A) Negative    Occult Blood Large (A) Negative    Micro Urine Req Microscopic    WET PREP   Result Value Ref Range    Significant Indicator NEG     Source GEN     Site CERVICAL     Wet Prep For Parasites       Few WBCs seen.  No yeast.  No motile Trichomonas seen.  No clue cells seen.     URINE  MICROSCOPIC (W/UA)   Result Value Ref Range    WBC Packed (A) /hpf    -150 (A) /hpf    Bacteria Negative None /hpf    Epithelial Cells Moderate (A) /hpf    Hyaline Cast 0-2 /lpf   ESTIMATED GFR   Result Value Ref Range    GFR If African American >60 >60 mL/min/1.73 m 2    GFR If Non African American >60 >60 mL/min/1.73 m 2     COURSE & MEDICAL DECISION MAKING    This is a 24 y.o. female who presents with mild dysuria onset two days. The patient's physical exam is remarkable for  site well approximated with no erythema or drainage    Differential Diagnosis includes but is not limited to:  UTI, pyelonephritis, endometritis, tension headache, migraine, or dehydration.    ED Course:  1:34 PM - The patient was seen and examined at bedside. The patient presents afebrile and tachycardic. Ordered for Wet Prep, Urinalysis, Lipase, CMP, and CBC with differentials. Treated the patient with Benadryl 25 mg, Reglan 10 mg, and lactated ringers infusion. It is likely her symptoms are the result of a UTI considering her fever last night and dysuria. I will perform a pelvic exam to assess any potential bleeding or discharge. I will await lab results before determining whether interventions are necessary. The patient is agreeable and understanding of my plan of care. Appears dehydrated, must be kept npo until surgical process ruled out, hence treatment with IV fluids.     Workup reassuring. Pelvic with nurse Ofelia done. No evidence of endometritis. UA + for infection. Labs stable. Headache no thunderclap onset normal neuro exam, Belgian SAH rule negative doubt imaging would help.     2:59 PM - Treated the patient with Omnicef 300 mg. Updated reassuring workup. Vitals improved after fluids I feel she's had a positive response to parenteral rehydration. Patient feel well, dc with omnicef, return if any worse. New PCP to be arranged by schedulers for routine health care.       Medications   lactated ringers infusion  (BOLUS) (0 mL Intravenous Stopped 20 1556)   metoclopramide (REGLAN) injection 10 mg (10 mg Intravenous Given 20 1425)   diphenhydrAMINE (BENADRYL) injection 25 mg (25 mg Intravenous Given 20 1425)   cefdinir (OMNICEF) capsule 300 mg (300 mg Oral Given 20 1511)       FINAL IMPRESSION  1. Acute pyelonephritis    2. Acute nonintractable headache, unspecified headache type    3. Dehydration    4. S/P  section        PRESCRIPTIONS  Discharge Medication List as of 2020  3:57 PM      START taking these medications    Details   cefdinir (OMNICEF) 300 MG Cap Take 1 Cap by mouth 2 times a day., Disp-20 Cap, R-0, Print Rx Paper      ondansetron (ZOFRAN ODT) 4 MG TABLET DISPERSIBLE Take 1 Tab by mouth every 8 hours as needed for Nausea for up to 3 days., Disp-9 Tab, R-0, Print Rx Paper             FOLLOW UP  [facility], Emergency Dept  [address] [place] [id number] [phone]  Today  If you have ANY new or worse symptoms!    [address] [address] [phone]  Schedule an appointment as soon as possible for a visit in 2 days  for recheck and routine health care        -DISCHARGE-     The patient is referred to a primary physician for blood pressure management, diabetic screening, and for all other preventative health concerns.     Pertinent Lab studies reviewed and verified by myself, as well as nursing notes and the patient's past medical, family, and social histories (See chart for details).    Results, exam findings, clinical impression, presumed diagnosis, treatment options, and strict return precautions were discussed with the patient, and they verbalized understanding, agreed with, and appreciated the plan of care.    IMarco (Tae), am scribing for, and in the presence of, Tam Box M.D..    Electronically signed by: Marco Oliveros (Tae), 2020    Tam BUNN M.D.  personally performed the services described in this documentation, as scribed by Marco Oliveros in my presence, and it is both accurate and complete.    Portions of this record were made with voice recognition software.  Despite my review, spelling/grammar/context errors may still remain.  Interpretation of this chart should be taken in this context.    C.     The note accurately reflects work and decisions made by me.  Tam Box  1/1/2020  9:42 PM

## 2020-01-01 NOTE — ED TRIAGE NOTES
Rain Rosado  Chief Complaint   Patient presents with   • Painful Urination     since Monday   • Headache   • Post-Op Complications      on      Pt ambulatory to triage with above complaint.  VSS, no acute distress. Pt states status post  on , started to have dysuria on Monday, with headaches and generally not feeling well.   Specimen cup given and instructed on clean catch urine sample.    Pt returned to [facility], educated on triage process, and to inform staff of any changes or concerns.

## 2020-01-01 NOTE — DISCHARGE INSTRUCTIONS
You were seen and evaluated in the Emergency Department at [facility] for:     Painful urination and headache and generally not feeling well after a  last week    You had the following tests and studies:    You do have evidence of any urine infection, where you can have an early kidney infection so we will treat that with an antibiotic    You received the following medications:    IV fluids, antibiotic    You received the following prescriptions:    Omnicef an antibiotic to take twice a day for 10 days, and ondansetron and nausea medicine to take only if needed  ----------------------------    Please make sure to follow up with:    [facility] and your OB/GYN next week for recheck and routine health care, but if you have any new or worsening symptoms before then return to the ER immediately.    Good luck, we hope you get better soon!  ----------------------------    We always encourage patients to return IMMEDIATELY if they have:  Increased or changing pain, passing out, fevers over 100.4 (taken in your mouth or rectally) for more than 2 days, redness or swelling of skin or tissues, feeling like your heart is beating fast, chest pain that is new or worsening, trouble breathing, feeling like your throat is closing up and can not breath, inability to walk, weakness of any part of your body, new dizziness, severe bleeding that won't stop from any part of your body, if you can't eat or drink, or if you have any other concerns.   If you feel worse, please know that you can always return with any questions, concerns, worse symptoms, or you are feeling unsafe. We certainly cannot say for sure that we have ruled out every illness or dangerous disease, but we feel that at this specific time, your exam, tests, and vital signs like heart rate and blood pressure are safe for discharge.

## 2020-01-02 NOTE — ED NOTES
Pt has been provided written and verbal education on urine infections. She has been provided education on when to return to ED. She has been instructed on how to take her 2 written prescriptions. Ambulatory to [facility].

## 2020-01-03 LAB
BACTERIA UR CULT: NORMAL
SIGNIFICANT IND 70042: NORMAL
SITE SITE: NORMAL
SOURCE SOURCE: NORMAL

## 2020-01-05 ENCOUNTER — APPOINTMENT (OUTPATIENT)
Dept: URGENT CARE | Facility: CLINIC | Age: 25
End: 2020-01-05
Payer: MEDICAID

## 2020-01-05 ENCOUNTER — HOSPITAL ENCOUNTER (EMERGENCY)
Facility: MEDICAL CENTER | Age: 25
End: 2020-01-05
Attending: EMERGENCY MEDICINE
Payer: MEDICAID

## 2020-01-05 VITALS
HEIGHT: 68 IN | HEART RATE: 115 BPM | BODY MASS INDEX: 33.28 KG/M2 | SYSTOLIC BLOOD PRESSURE: 149 MMHG | DIASTOLIC BLOOD PRESSURE: 91 MMHG | OXYGEN SATURATION: 98 % | WEIGHT: 219.58 LBS | RESPIRATION RATE: 16 BRPM | TEMPERATURE: 97.7 F

## 2020-01-05 DIAGNOSIS — Z51.89 ENCOUNTER FOR WOUND RE-CHECK: ICD-10-CM

## 2020-01-05 PROCEDURE — 99283 EMERGENCY DEPT VISIT LOW MDM: CPT

## 2020-01-05 NOTE — ED PROVIDER NOTES
ED Provider Note    Scribed for Christiano Nugent M.D. by Ho Delatorre. 2020, 2:27 PM.    Primary care provider: Pcp Pt States None  Means of arrival: walk-in  History obtained from: patient  History limited by: none    CHIEF COMPLAINT  Chief Complaint   Patient presents with   • Wound Check   • Painful Urination       HPI  Rain Rosado is a 25 y.o. female who presents to the Emergency Department with concerns for a wound recheck. She states she gave birth on 2019. She had a  by  Dr. Lawrence (OB/GYN). She states she has started to notice milky-yellow discharge from the incision site. She denies any abdominal pain. She has not spoken to Dr. Lawrence yet, but she has an appointment with them next week. She also was diagnosed with a UTI on 20, and she has been taking her antibiotics for the past 3 days. Her symptoms have slightly improved since onset, but she still has some mild dysuria. She denies any fever.    REVIEW OF SYSTEMS  Pertinent positives include drainage around  incision and mild dysuria. Pertinent negatives include no fever.  All other systems reviewed and negative.    PAST MEDICAL HISTORY  No chronic medical history.     SURGICAL HISTORY   has a past surgical history that includes repeat c section (2019).    SOCIAL HISTORY  Social History     Tobacco Use   • Smoking status: Never Smoker   • Smokeless tobacco: Never Used   Substance Use Topics   • Alcohol use: No   • Drug use: No      Social History     Substance and Sexual Activity   Drug Use No       FAMILY HISTORY  History reviewed. No pertinent family history.    CURRENT MEDICATIONS  Home Medications     Reviewed by Federico Navarro R.N. (Registered Nurse) on 20 at 1349  Med List Status: Partial   Medication Last Dose Status   cefdinir (OMNICEF) 300 MG Cap 2020 Active   ibuprofen (MOTRIN) 600 MG Tab 2020 Active                ALLERGIES  No Known Allergies    PHYSICAL EXAM  VITAL SIGNS: BP  "149/91   Pulse (!) 115   Temp 36.5 °C (97.7 °F) (Temporal)   Resp 16   Ht 1.727 m (5' 8\")   Wt 99.6 kg (219 lb 9.3 oz)   SpO2 98%   BMI 33.39 kg/m²     Constitutional: Well developed, Well nourished, No acute distress, Non-toxic appearance.   HENT: Normocephalic, Atraumatic, TMs normal, mucous membranes moist, no erythema, exudates, swelling, or masses, nares patent  Eyes: nonicteric  Neck: Supple, no meningismus  Lymphatic: No lymphadenopathy noted.   Cardiovascular: Regular rate and rhythm, no gallops rubs or murmurs  Lungs: Clear bilaterally   Abdomen: Bowel sounds normal, Soft, No tenderness  Skin: Warm, Dry, no rash  Back: No tenderness, No CVA tenderness.   Genitalia: Deferred  Rectal: Deferred  Extremities: No edema  Neurologic: Alert, appropriate, follows commands, moving all extremities, normal speech   Psychiatric: Affect normal      COURSE & MEDICAL DECISION MAKING  Nursing notes, VS, PMSFHx reviewed in chart.     2:27 PM Patient seen and examined at bedside. Ultrasound was completed at bedside and no fluid collection was identified. Paged Dr. Scott.    3:13 PM I discussed the patient's case and the above findings with Dr. Scott (OB/GYN) who advised that the patient can be discharged and return for a wound check.     3:15 PM - Patient was reevaluated at bedside. I discussed my consultation with Dr. Scott. I recommended that the patient continue to take the antibiotics as her symptoms have been improving, and she should follow-up with her PCP for a repeat UA once she has completed treatment.     Decision Making:  This is a 25 y.o. year old female who presents with a visit for wound check.  She has some clear yellow drainage from the left side of her wound.  It has not dehisced.  There is no surrounding erythema or drainage to suggest an acute infection.  I performed a bedside ultrasound and I see no fluid collection beneath the surface.  The patient still states that she has mild urinary " symptoms although they have improved.  She is currently on Omnicef and actually her urine did not grow anything out.  She will finish her antibiotics to completion.  She will follow-up with Dr. Rodríguez later this week.  I contacted Dr. Scott and spoke with her over the phone in regards to examination findings and she is aware.    The patient will return for new or worsening symptoms and is stable at the time of discharge.    The patient is referred to a primary physician for blood pressure management, diabetic screening, and for all other preventative health concerns.    DISPOSITION:  Patient will be discharged home in stable condition.    FOLLOW UP:  Corinne E Capurro, M.D.  [address]  [facility] [id number] [phone]    In 3 days        OUTPATIENT MEDICATIONS:  New Prescriptions    No medications on file       FINAL IMPRESSION  1. Encounter for wound re-check          Ho BUNN (Scribe), am scribing for, and in the presence of, Christiano Nugent M.D..    Electronically signed by: Ho Delatorre (Scribe), 1/5/2020    Christiano BUNN M.D. personally performed the services described in this documentation, as scribed by Ho Delatorre in my presence, and it is both accurate and complete.     The note accurately reflects work and decisions made by me.  Christiano Nugent  1/5/2020  3:20 PM

## 2020-01-05 NOTE — ED TRIAGE NOTES
26 y/o female ambulatory to triage with c/o painful urination that has continued despite being on abx for a UTI since the . Pt also states she has an open wound from her  that she is concerned is infected.

## 2020-01-05 NOTE — ED NOTES
Pt is here for a wound check, and has not yet completed her ordered ABX previously ordered here on 01/01/2020. Pt is still having pain with urination but it has improved. Pt gave birth 12/26/2019.

## 2020-02-08 ENCOUNTER — HOSPITAL ENCOUNTER (EMERGENCY)
Facility: MEDICAL CENTER | Age: 25
End: 2020-02-08
Attending: EMERGENCY MEDICINE
Payer: MEDICAID

## 2020-02-08 VITALS
SYSTOLIC BLOOD PRESSURE: 130 MMHG | HEIGHT: 68 IN | WEIGHT: 213.19 LBS | OXYGEN SATURATION: 98 % | RESPIRATION RATE: 15 BRPM | HEART RATE: 75 BPM | DIASTOLIC BLOOD PRESSURE: 80 MMHG | TEMPERATURE: 97.9 F | BODY MASS INDEX: 32.31 KG/M2

## 2020-02-08 DIAGNOSIS — R05.9 COUGH: ICD-10-CM

## 2020-02-08 PROCEDURE — A9270 NON-COVERED ITEM OR SERVICE: HCPCS | Performed by: EMERGENCY MEDICINE

## 2020-02-08 PROCEDURE — 700102 HCHG RX REV CODE 250 W/ 637 OVERRIDE(OP): Performed by: EMERGENCY MEDICINE

## 2020-02-08 PROCEDURE — 99284 EMERGENCY DEPT VISIT MOD MDM: CPT

## 2020-02-08 RX ORDER — BENZONATATE 100 MG/1
200 CAPSULE ORAL ONCE
Status: COMPLETED | OUTPATIENT
Start: 2020-02-08 | End: 2020-02-08

## 2020-02-08 RX ORDER — BENZONATATE 100 MG/1
100 CAPSULE ORAL 3 TIMES DAILY PRN
Qty: 21 CAP | Refills: 0 | Status: SHIPPED
Start: 2020-02-08 | End: 2020-03-19

## 2020-02-08 RX ADMIN — BENZONATATE 200 MG: 100 CAPSULE ORAL at 02:00

## 2020-02-08 NOTE — ED PROVIDER NOTES
"ED Provider Note    Scribed for Kulwinder Sandy M.D. by Kulwinder Sandy M.D.. 2/8/2020  1:34 AM    CHIEF COMPLAINT  Chief Complaint   Patient presents with   • Cough     x 1 week       HPI  Rain CHE Rosado is a 25 y.o. female who presents to the Emergency Room for about a week of a nonproductive dry cough, that she says makes her feel like she is suffocating.  There are no obvious exacerbating relieving factors.  She denies fevers or chills or nausea or vomiting or chest pain.  She reports that lots of people at work have similar symptoms.  She has not had body aches or myalgias.  She does not have a history of asthma or lung disease.  She is not a smoker.    REVIEW OF SYSTEMS  See HPI for further details.    PAST MEDICAL HISTORY   No diabetes or immunosuppression, no asthma or lung disease.    SOCIAL HISTORY  Social History     Tobacco Use   • Smoking status: Never Smoker   • Smokeless tobacco: Never Used   Substance and Sexual Activity   • Alcohol use: No   • Drug use: No   • Sexual activity: Not on file       SURGICAL HISTORY   has a past surgical history that includes repeat c section (12/26/2019).    CURRENT MEDICATIONS  Home Medications    **Home medications have not yet been reviewed for this encounter**         ALLERGIES  No Known Allergies    PHYSICAL EXAM  VITAL SIGNS: /82   Pulse 81   Temp 36.6 °C (97.9 °F)   Resp 16   Ht 1.727 m (5' 8\")   Wt 96.7 kg (213 lb 3 oz)   SpO2 99%   BMI 32.41 kg/m²   Pulse ox interpretation: I interpret this pulse ox as normal.  Constitutional: Alert in no apparent distress.  HENT: Normocephalic, Atraumatic, Bilateral external ears normal. Nose normal.   Eyes: Conjunctiva normal, non-icteric.   Heart: Regular rate and rythm, no murmurs.    Lungs: Clear to auscultation bilaterally.  Skin: Warm, Dry, No erythema, No rash.   Neurologic: Alert, Grossly non-focal.   Psychiatric: Affect normal, Judgment normal, Mood normal, Appears appropriate and not " intoxicated.     COURSE & MEDICAL DECISION MAKING  The patient's VS, Nurses notes reviewed. (See chart for details)    1:34 AM Patient seen and examined at bedside.  She has had a few days of a nonproductive cough without fevers.  She has normal vital signs and normal physical exam.  Her symptoms are likely caused by a virus.  She will be treated here with Tessalon Perles, which will be prescribed at discharge.  We discussed supportive care for coughs.   The patient will return for new or worsening symptoms and is stable at the time of discharge.    The patient is referred to a primary physician for blood pressure management, diabetic screening, and for all other preventative health concerns.    DISPOSITION:  Patient will be discharged home in stable condition.    FOLLOW UP:  [address] [address]    for primary care    [address] [address] [phone]    for primary care    [address] [address] [phone]    for primary care      OUTPATIENT MEDICATIONS:  New Prescriptions    BENZONATATE (TESSALON) 100 MG CAP    Take 1 Cap by mouth 3 times a day as needed for Cough for up to 21 doses.         FINAL IMPRESSION  1. Cough

## 2020-02-08 NOTE — ED NOTES
Pt ambulatory to hospital room w/ steady gait. Pt denies pinpoint cp, sob at this time.     Agree w/ rest of triage note, erp to see.

## 2020-02-08 NOTE — ED TRIAGE NOTES
"Chief Complaint   Patient presents with   • Cough     x 1 week     Pt reports non-productive cough as above, denies fever/chill or other symptoms. Pt educated on triage process and placed back in lobby, pt encourage to alert staff with changes in condition.      /82   Pulse 81   Temp 36.6 °C (97.9 °F)   Resp 16   Ht 1.727 m (5' 8\")   Wt 96.7 kg (213 lb 3 oz)   SpO2 99%   BMI 32.41 kg/m²     "

## 2020-02-17 ENCOUNTER — APPOINTMENT (OUTPATIENT)
Dept: RADIOLOGY | Facility: MEDICAL CENTER | Age: 25
End: 2020-02-17
Payer: MEDICAID

## 2020-02-17 ENCOUNTER — APPOINTMENT (OUTPATIENT)
Dept: RADIOLOGY | Facility: MEDICAL CENTER | Age: 25
End: 2020-02-17
Attending: EMERGENCY MEDICINE
Payer: MEDICAID

## 2020-02-17 ENCOUNTER — HOSPITAL ENCOUNTER (EMERGENCY)
Facility: MEDICAL CENTER | Age: 25
End: 2020-02-18
Attending: EMERGENCY MEDICINE
Payer: MEDICAID

## 2020-02-17 DIAGNOSIS — R10.13 EPIGASTRIC ABDOMINAL PAIN: ICD-10-CM

## 2020-02-17 LAB
ALBUMIN SERPL BCP-MCNC: 3.9 G/DL (ref 3.2–4.9)
ALBUMIN/GLOB SERPL: 1.1 G/DL
ALP SERPL-CCNC: 68 U/L (ref 30–99)
ALT SERPL-CCNC: 27 U/L (ref 2–50)
ANION GAP SERPL CALC-SCNC: 8 MMOL/L (ref 0–11.9)
AST SERPL-CCNC: 25 U/L (ref 12–45)
BASOPHILS # BLD AUTO: 0.3 % (ref 0–1.8)
BASOPHILS # BLD: 0.02 K/UL (ref 0–0.12)
BILIRUB SERPL-MCNC: 0.2 MG/DL (ref 0.1–1.5)
BUN SERPL-MCNC: 14 MG/DL (ref 8–22)
CALCIUM SERPL-MCNC: 9.1 MG/DL (ref 8.5–10.5)
CHLORIDE SERPL-SCNC: 109 MMOL/L (ref 96–112)
CO2 SERPL-SCNC: 23 MMOL/L (ref 20–33)
CREAT SERPL-MCNC: 0.72 MG/DL (ref 0.5–1.4)
D DIMER PPP IA.FEU-MCNC: <0.4 UG/ML (FEU) (ref 0–0.5)
EKG IMPRESSION: NORMAL
EOSINOPHIL # BLD AUTO: 0.1 K/UL (ref 0–0.51)
EOSINOPHIL NFR BLD: 1.3 % (ref 0–6.9)
ERYTHROCYTE [DISTWIDTH] IN BLOOD BY AUTOMATED COUNT: 40.1 FL (ref 35.9–50)
GLOBULIN SER CALC-MCNC: 3.7 G/DL (ref 1.9–3.5)
GLUCOSE SERPL-MCNC: 92 MG/DL (ref 65–99)
HCG SERPL QL: NEGATIVE
HCT VFR BLD AUTO: 35.4 % (ref 37–47)
HGB BLD-MCNC: 11.5 G/DL (ref 12–16)
IMM GRANULOCYTES # BLD AUTO: 0.05 K/UL (ref 0–0.11)
IMM GRANULOCYTES NFR BLD AUTO: 0.7 % (ref 0–0.9)
LYMPHOCYTES # BLD AUTO: 3.23 K/UL (ref 1–4.8)
LYMPHOCYTES NFR BLD: 42.2 % (ref 22–41)
MCH RBC QN AUTO: 26.7 PG (ref 27–33)
MCHC RBC AUTO-ENTMCNC: 32.5 G/DL (ref 33.6–35)
MCV RBC AUTO: 82.3 FL (ref 81.4–97.8)
MONOCYTES # BLD AUTO: 0.38 K/UL (ref 0–0.85)
MONOCYTES NFR BLD AUTO: 5 % (ref 0–13.4)
NEUTROPHILS # BLD AUTO: 3.87 K/UL (ref 2–7.15)
NEUTROPHILS NFR BLD: 50.5 % (ref 44–72)
NRBC # BLD AUTO: 0 K/UL
NRBC BLD-RTO: 0 /100 WBC
PLATELET # BLD AUTO: 307 K/UL (ref 164–446)
PMV BLD AUTO: 11 FL (ref 9–12.9)
POTASSIUM SERPL-SCNC: 3.8 MMOL/L (ref 3.6–5.5)
PROT SERPL-MCNC: 7.6 G/DL (ref 6–8.2)
RBC # BLD AUTO: 4.3 M/UL (ref 4.2–5.4)
SODIUM SERPL-SCNC: 140 MMOL/L (ref 135–145)
TROPONIN T SERPL-MCNC: <6 NG/L (ref 6–19)
WBC # BLD AUTO: 7.7 K/UL (ref 4.8–10.8)

## 2020-02-17 PROCEDURE — 700102 HCHG RX REV CODE 250 W/ 637 OVERRIDE(OP): Performed by: EMERGENCY MEDICINE

## 2020-02-17 PROCEDURE — 80053 COMPREHEN METABOLIC PANEL: CPT

## 2020-02-17 PROCEDURE — 99285 EMERGENCY DEPT VISIT HI MDM: CPT

## 2020-02-17 PROCEDURE — 84484 ASSAY OF TROPONIN QUANT: CPT

## 2020-02-17 PROCEDURE — 85025 COMPLETE CBC W/AUTO DIFF WBC: CPT

## 2020-02-17 PROCEDURE — A9270 NON-COVERED ITEM OR SERVICE: HCPCS | Performed by: EMERGENCY MEDICINE

## 2020-02-17 PROCEDURE — 85379 FIBRIN DEGRADATION QUANT: CPT

## 2020-02-17 PROCEDURE — 93005 ELECTROCARDIOGRAM TRACING: CPT | Performed by: EMERGENCY MEDICINE

## 2020-02-17 PROCEDURE — 84703 CHORIONIC GONADOTROPIN ASSAY: CPT

## 2020-02-17 PROCEDURE — 93005 ELECTROCARDIOGRAM TRACING: CPT

## 2020-02-17 RX ADMIN — LIDOCAINE HYDROCHLORIDE 15 ML: 20 SOLUTION OROPHARYNGEAL at 23:30

## 2020-02-18 ENCOUNTER — APPOINTMENT (OUTPATIENT)
Dept: RADIOLOGY | Facility: MEDICAL CENTER | Age: 25
End: 2020-02-18
Attending: EMERGENCY MEDICINE
Payer: MEDICAID

## 2020-02-18 VITALS
HEART RATE: 71 BPM | TEMPERATURE: 97.9 F | DIASTOLIC BLOOD PRESSURE: 60 MMHG | SYSTOLIC BLOOD PRESSURE: 105 MMHG | HEIGHT: 68 IN | BODY MASS INDEX: 32.64 KG/M2 | WEIGHT: 215.39 LBS | OXYGEN SATURATION: 99 % | RESPIRATION RATE: 18 BRPM

## 2020-02-18 PROCEDURE — 71045 X-RAY EXAM CHEST 1 VIEW: CPT

## 2020-02-18 RX ORDER — FAMOTIDINE 20 MG/1
20 TABLET, FILM COATED ORAL 2 TIMES DAILY
Qty: 60 TAB | Refills: 0 | Status: SHIPPED
Start: 2020-02-18 | End: 2020-03-19

## 2020-02-18 RX ORDER — FAMOTIDINE 20 MG/1
20 TABLET, FILM COATED ORAL 2 TIMES DAILY
Qty: 60 TAB | Refills: 0 | Status: SHIPPED | OUTPATIENT
Start: 2020-02-18 | End: 2020-02-18 | Stop reason: SDUPTHER

## 2020-02-18 NOTE — ED TRIAGE NOTES
"Chief Complaint   Patient presents with   • Arm Pain     left arm X1 week denies traumatic even    • Epigastric Pain     \"heartburn\" pt states worsens saturday and sunday when she eats something.      Pt ambulatory to triage room from [facility] with above complaints.  Pt c/o 7/10 left arm pain and 8/10 epigastric/abdominal pain.  Pt able to carry infant carrier in left arm to and from triage room.      Pt educated on Er process and returned to [facility]. RN informed pt to notify staff with any change in condition or worsening of symptoms.      /80   Pulse 75   Temp 36.6 °C (97.9 °F) (Oral)   Resp 18   Ht 1.727 m (5' 8\")   Wt 97.7 kg (215 lb 6.2 oz)   LMP 01/24/2020 (Approximate)   SpO2 96%   Breastfeeding No   BMI 32.75 kg/m²    "

## 2020-02-18 NOTE — ED NOTES
Pt given discharge instructions.  PIV removed, dressing applied. Signed copy in chart. Pt states all belongings in possession. Pt ambulatory off unit with steady gait.

## 2020-02-18 NOTE — ED PROVIDER NOTES
"ED Provider Note    CHIEF COMPLAINT  Chief Complaint   Patient presents with   • Arm Pain     left arm X1 week denies traumatic even    • Epigastric Pain     \"heartburn\" pt states worsens saturday and sunday when she eats something.        HPI  Rain Rosado is a 25 y.o. female here for evaluation of some mild epigastric pain with radiation into the abdomen.  The patient states that she has had this in the past, and it is worse after she eats something spicy.  She has no other medical concerns at this time.  She has no vomiting, no fever, no chills.  She has no nausea or diaphoresis.  Patient states that the pain is right in the epigastrium radiating up into the center of her chest.  The patient has not taken anything prior to arrival coming in.  She states that she has had this multiple times in the past and is always is from different types of food that she will eat.  The patient has no issues with dysuria, urgency or frequency.    ROS;  Please see HPI  O/W negative     PAST MEDICAL HISTORY   No bleeding disorders    SOCIAL HISTORY  Social History     Tobacco Use   • Smoking status: Never Smoker   • Smokeless tobacco: Never Used   Substance and Sexual Activity   • Alcohol use: No   • Drug use: No   • Sexual activity: Not on file       SURGICAL HISTORY   has a past surgical history that includes repeat c section (12/26/2019).    CURRENT MEDICATIONS  Home Medications     Reviewed by Daysi Townsend R.N. (Registered Nurse) on 02/17/20 at 4790  Med List Status: Partial   Medication Last Dose Status   benzonatate (TESSALON) 100 MG Cap  Active   cefdinir (OMNICEF) 300 MG Cap  Active   ibuprofen (MOTRIN) 600 MG Tab  Active                ALLERGIES  No Known Allergies    REVIEW OF SYSTEMS  See HPI for further details. Review of systems as above, otherwise all other systems are negative.     PHYSICAL EXAM  VITAL SIGNS: /86   Pulse 94   Temp 36.6 °C (97.9 °F) (Oral)   Resp 18   Ht 1.727 m (5' 8\")   Wt " 97.7 kg (215 lb 6.2 oz)   LMP 01/24/2020 (Approximate)   SpO2 99%   Breastfeeding No   BMI 32.75 kg/m²     Constitutional: Well developed, well nourished. No acute distress.  HEENT: Normocephalic, atraumatic. MMM  Neck: Supple, Full range of motion   Chest/Pulmonary:  No respiratory distress.  Equal expansion   Abdomen; soft, mild epigastric tenderness, no rebound, no peritoneal signs  Musculoskeletal: No deformity, no edema, neurovascular intact.   Neuro: Clear speech, appropriate, cooperative, cranial nerves II-XII grossly intact.  Psych: Normal mood and affect    Results for orders placed or performed during the hospital encounter of 02/17/20   CBC with Differential   Result Value Ref Range    WBC 7.7 4.8 - 10.8 K/uL    RBC 4.30 4.20 - 5.40 M/uL    Hemoglobin 11.5 (L) 12.0 - 16.0 g/dL    Hematocrit 35.4 (L) 37.0 - 47.0 %    MCV 82.3 81.4 - 97.8 fL    MCH 26.7 (L) 27.0 - 33.0 pg    MCHC 32.5 (L) 33.6 - 35.0 g/dL    RDW 40.1 35.9 - 50.0 fL    Platelet Count 307 164 - 446 K/uL    MPV 11.0 9.0 - 12.9 fL    Neutrophils-Polys 50.50 44.00 - 72.00 %    Lymphocytes 42.20 (H) 22.00 - 41.00 %    Monocytes 5.00 0.00 - 13.40 %    Eosinophils 1.30 0.00 - 6.90 %    Basophils 0.30 0.00 - 1.80 %    Immature Granulocytes 0.70 0.00 - 0.90 %    Nucleated RBC 0.00 /100 WBC    Neutrophils (Absolute) 3.87 2.00 - 7.15 K/uL    Lymphs (Absolute) 3.23 1.00 - 4.80 K/uL    Monos (Absolute) 0.38 0.00 - 0.85 K/uL    Eos (Absolute) 0.10 0.00 - 0.51 K/uL    Baso (Absolute) 0.02 0.00 - 0.12 K/uL    Immature Granulocytes (abs) 0.05 0.00 - 0.11 K/uL    NRBC (Absolute) 0.00 K/uL   Complete Metabolic Panel (CMP)   Result Value Ref Range    Sodium 140 135 - 145 mmol/L    Potassium 3.8 3.6 - 5.5 mmol/L    Chloride 109 96 - 112 mmol/L    Co2 23 20 - 33 mmol/L    Anion Gap 8.0 0.0 - 11.9    Glucose 92 65 - 99 mg/dL    Bun 14 8 - 22 mg/dL    Creatinine 0.72 0.50 - 1.40 mg/dL    Calcium 9.1 8.5 - 10.5 mg/dL    AST(SGOT) 25 12 - 45 U/L    ALT(SGPT) 27 2  - 50 U/L    Alkaline Phosphatase 68 30 - 99 U/L    Total Bilirubin 0.2 0.1 - 1.5 mg/dL    Albumin 3.9 3.2 - 4.9 g/dL    Total Protein 7.6 6.0 - 8.2 g/dL    Globulin 3.7 (H) 1.9 - 3.5 g/dL    A-G Ratio 1.1 g/dL   Troponin   Result Value Ref Range    Troponin T <6 6 - 19 ng/L   D-DIMER   Result Value Ref Range    D-Dimer Screen <0.40 0.00 - 0.50 ug/mL (FEU)   BETA-HCG QUALITATIVE SERUM   Result Value Ref Range    Beta-Hcg Qualitative Serum Negative Negative   ESTIMATED GFR   Result Value Ref Range    GFR If African American >60 >60 mL/min/1.73 m 2    GFR If Non African American >60 >60 mL/min/1.73 m 2   EKG   Result Value Ref Range    Report       [facility] Emergency Dept.    Test Date:  2020  Pt Name:    CHAVEZ PEREZ        Department: ER  MRN:        [id number]                      Room:       [facility]  Gender:     Female                       Technician: [id number]  :        1995                   Requested By:ER TRIAGE PROTOCOL  Order #:    [id number]                    Reading MD:    Measurements  Intervals                                Axis  Rate:       69                           P:          43  TX:         144                          QRS:        45  QRSD:       86                           T:          26  QT:         388  QTc:        416    Interpretive Statements  SINUS RHYTHM  No previous ECG available for comparison       DX-CHEST-PORTABLE (1 VIEW)   Final Result         1.  No acute cardiopulmonary disease.        EKG; normal sinus rhythm at a rate of 69.  No ST elevation, no ST depression.  QTC is 416.  No ectopy.    PROCEDURES     MEDICAL RECORD  I have reviewed patient's medical record and pertinent results are listed.    COURSE & MEDICAL DECISION MAKING  I have reviewed any medical record information, laboratory studies and radiographic results as noted above.    1:13 AM  The patient states that she feels better after the GI cocktail.  In addition she has a negative  cardiac work-up that was initiated when she arrived.  She has negative troponin, and negative d-dimer, and unremarkable EKG, negative chest x-ray, and a heart score of 0.  She has a history of this in the past multiple times, and this feels very similar to the previous issues that she has had which required her to take Pepcid for the same thing.    I you have had any blood pressure issues while here in the emergency department, please see your doctor for a further evaluation or work up.    Differential diagnoses include but not limited to: Gastritis, MI, PE, pneumonia, bowel obstruction, pregnancy, UTI    This patient presents with abdominal pain and gastritis at this time, I have counseled the patient/family regarding their medications, pain control, and follow up.  They will continue their medications, if any, as prescribed.  They will return immediately for any worsening symptoms and/or any other medical concerns.  They will see their doctor, or contact the doctor provided, in 1-2 days for follow up.       FINAL IMPRESSION  Gastritis   Abdominal pain      Electronically signed by: Betito Ballesteros D.O., 2/18/2020 1:04 AM

## 2020-02-18 NOTE — ED NOTES
Pt ambulatory to R7 with steady gait. Reports +sob, arm pain, epigastric pain. Chest pain protocol ordered. ERP to see. Blood sent to lab.

## 2020-03-01 ENCOUNTER — OFFICE VISIT (OUTPATIENT)
Dept: URGENT CARE | Facility: CLINIC | Age: 25
End: 2020-03-01
Payer: MEDICAID

## 2020-03-01 VITALS
HEIGHT: 68 IN | BODY MASS INDEX: 31.83 KG/M2 | HEART RATE: 97 BPM | WEIGHT: 210 LBS | SYSTOLIC BLOOD PRESSURE: 112 MMHG | RESPIRATION RATE: 14 BRPM | TEMPERATURE: 97.9 F | OXYGEN SATURATION: 97 % | DIASTOLIC BLOOD PRESSURE: 74 MMHG

## 2020-03-01 DIAGNOSIS — B00.1 COLD SORE: ICD-10-CM

## 2020-03-01 DIAGNOSIS — L08.9 BLISTER OF LIP WITH INFECTION, INITIAL ENCOUNTER: ICD-10-CM

## 2020-03-01 DIAGNOSIS — S00.521A BLISTER OF LIP WITH INFECTION, INITIAL ENCOUNTER: ICD-10-CM

## 2020-03-01 PROCEDURE — 99203 OFFICE O/P NEW LOW 30 MIN: CPT | Performed by: PHYSICIAN ASSISTANT

## 2020-03-01 RX ORDER — AMOXICILLIN AND CLAVULANATE POTASSIUM 875; 125 MG/1; MG/1
1 TABLET, FILM COATED ORAL 2 TIMES DAILY
Qty: 10 TAB | Refills: 0 | Status: SHIPPED | OUTPATIENT
Start: 2020-03-01 | End: 2020-03-06

## 2020-03-01 RX ORDER — ACYCLOVIR 400 MG/1
400 TABLET ORAL 3 TIMES DAILY
Qty: 21 TAB | Refills: 0 | Status: SHIPPED | OUTPATIENT
Start: 2020-03-01 | End: 2020-03-08

## 2020-03-01 ASSESSMENT — FIBROSIS 4 INDEX: FIB4 SCORE: 0.38

## 2020-03-10 ASSESSMENT — ENCOUNTER SYMPTOMS
HEADACHES: 0
MYALGIAS: 0
DIZZINESS: 0
EYE DISCHARGE: 0
CHILLS: 0
COUGH: 0
FEVER: 0
VOMITING: 0
SHORTNESS OF BREATH: 0
EYE REDNESS: 0
BRUISES/BLEEDS EASILY: 0
NAUSEA: 0

## 2020-03-10 NOTE — PROGRESS NOTES
Subjective:      Rain Rosado is a 25 y.o. female who presents with Cold Sores (on the lip x 2 days)    HPI:  This is a new problem. Rain Rosado is a 25 y.o. female who presents today for evaluation of cold sore.  Patient states that she had what appeared to be a cold sore pop up on her bottom left lip 2 days ago.  She said that it blistered she popped it yesterday.  She said since that time it has been much more painful and her entire lip has been swollen.  No fever/chills.  She reports history of cold sores but never this big.  She HAS never taken antiviral medication for it.        Review of Systems   Constitutional: Negative for chills and fever.   HENT:        Cold sore with possible infection on bottom lip   Eyes: Negative for discharge and redness.   Respiratory: Negative for cough and shortness of breath.    Gastrointestinal: Negative for nausea and vomiting.   Musculoskeletal: Negative for myalgias.   Neurological: Negative for dizziness and headaches.   Endo/Heme/Allergies: Does not bruise/bleed easily.       PMH:  has no past medical history on file.  MEDS:   Current Outpatient Medications:   •  famotidine (PEPCID) 20 MG Tab, Take 1 Tab by mouth 2 times a day. (Patient not taking: Reported on 3/1/2020), Disp: 60 Tab, Rfl: 0  •  benzonatate (TESSALON) 100 MG Cap, Take 1 Cap by mouth 3 times a day as needed for Cough for up to 21 doses. (Patient not taking: Reported on 3/1/2020), Disp: 21 Cap, Rfl: 0  •  ibuprofen (MOTRIN) 600 MG Tab, Take 1 Tab by mouth every 6 hours as needed (For cramping after delivery; do not give if patient is receiving ketorolac (Toradol)). (Patient not taking: Reported on 3/1/2020), Disp: 60 Tab, Rfl: 0  ALLERGIES: No Known Allergies  SURGHX:   Past Surgical History:   Procedure Laterality Date   • REPEAT C SECTION  2019    Procedure:  SECTION, REPEAT;  Surgeon: Corinne E Capurro, M.D.;  Location: LABOR AND DELIVERY;  Service: Labor and Delivery  "    SOCHX:  reports that she has never smoked. She has never used smokeless tobacco. She reports that she does not drink alcohol or use drugs.  FH: Family history was reviewed, no pertinent findings to report     Objective:     /74 (BP Location: Left arm, Patient Position: Sitting, BP Cuff Size: Adult long)   Pulse 97   Temp 36.6 °C (97.9 °F) (Temporal)   Resp 14   Ht 1.727 m (5' 8\")   Wt 95.3 kg (210 lb)   SpO2 97%   BMI 31.93 kg/m²      Physical Exam  Constitutional:       Appearance: She is well-developed.   HENT:      Head: Normocephalic and atraumatic.      Right Ear: External ear normal.      Left Ear: External ear normal.      Mouth/Throat:      Lips: Lesions present.        Comments: Bottom of left foot exhibits a lesion with what appears to be a popped blister overlying it.  The surrounding lip is edematous and erythematous with mild induration.  No fluctuance.  No drainage.  It is moderately tender to palpation.  Eyes:      Conjunctiva/sclera: Conjunctivae normal.      Pupils: Pupils are equal, round, and reactive to light.   Neck:      Musculoskeletal: Normal range of motion.   Cardiovascular:      Rate and Rhythm: Normal rate and regular rhythm.      Heart sounds: Normal heart sounds. No murmur.   Pulmonary:      Effort: Pulmonary effort is normal.      Breath sounds: Normal breath sounds. No wheezing.   Lymphadenopathy:      Cervical: No cervical adenopathy.   Skin:     General: Skin is warm and dry.      Capillary Refill: Capillary refill takes less than 2 seconds.   Neurological:      Mental Status: She is alert and oriented to person, place, and time.   Psychiatric:         Behavior: Behavior normal.         Judgment: Judgment normal.            Assessment/Plan:       1. Cold sore  - acyclovir (ZOVIRAX) 400 MG tablet; Take 1 Tab by mouth 3 times a day for 7 days.  Dispense: 21 Tab; Refill: 0  *To assist patient the importance of not attempting to pop cold sores as this time it has led to " a secondary bacterial infection    2. Blister of lip with infection, initial encounter  - amoxicillin-clavulanate (AUGMENTIN) 875-125 MG Tab; Take 1 Tab by mouth 2 times a day for 5 days.  Dispense: 10 Tab; Refill: 0  -Worsening infection precautions discussed          Differential Diagnosis, natural history, and supportive care discussed. Return to the Urgent Care or follow up with your PCP if symptoms fail to resolve, or for any new or worsening symptoms. Emergency room precautions discussed. Patient and/or family appears understanding of information.

## 2020-03-12 ENCOUNTER — HOSPITAL ENCOUNTER (EMERGENCY)
Dept: HOSPITAL 8 - ED | Age: 25
LOS: 1 days | Discharge: HOME | End: 2020-03-13
Payer: MEDICAID

## 2020-03-12 VITALS — SYSTOLIC BLOOD PRESSURE: 121 MMHG | DIASTOLIC BLOOD PRESSURE: 77 MMHG

## 2020-03-12 VITALS — BODY MASS INDEX: 32.18 KG/M2 | WEIGHT: 212.31 LBS | HEIGHT: 68 IN

## 2020-03-12 DIAGNOSIS — R30.0: ICD-10-CM

## 2020-03-12 DIAGNOSIS — R11.0: ICD-10-CM

## 2020-03-12 DIAGNOSIS — R10.2: Primary | ICD-10-CM

## 2020-03-12 LAB
CULTURE INDICATED?: YES
HCG UR SG: 1.02 (ref 1–1.03)
MICROSCOPIC: (no result)

## 2020-03-12 PROCEDURE — 81001 URINALYSIS AUTO W/SCOPE: CPT

## 2020-03-12 PROCEDURE — 76830 TRANSVAGINAL US NON-OB: CPT

## 2020-03-12 PROCEDURE — 87086 URINE CULTURE/COLONY COUNT: CPT

## 2020-03-12 PROCEDURE — 99284 EMERGENCY DEPT VISIT MOD MDM: CPT

## 2020-03-12 PROCEDURE — 81025 URINE PREGNANCY TEST: CPT

## 2020-03-12 NOTE — NUR
UA results returned; US ordered. Patient resting in [facility] with no complaints 
at this time.

## 2020-03-12 NOTE — NUR
Pt presents to ed c/o painful urinationx2 days. States recent  3 
months ago, and treated for UTI shortly after. Denies fever at home. States 
painful urination and increased frequency. Ua collected. Bedside report to 
Cristy dyer.

## 2020-03-12 NOTE — NUR
Bedside report received from CECILLE Anne. This RN to assume care. Urine sent to 
lab; awaiting results. Patient has no complaints at this time.

## 2020-03-13 NOTE — NUR
Late entry: Discharge instructions given. All questions and concerns addressed. 
Patient ambulatory with a steady gait. Belongings with patient.

## 2020-03-19 ENCOUNTER — HOSPITAL ENCOUNTER (EMERGENCY)
Facility: MEDICAL CENTER | Age: 25
End: 2020-03-19
Attending: EMERGENCY MEDICINE
Payer: MEDICAID

## 2020-03-19 VITALS
WEIGHT: 209.66 LBS | HEIGHT: 68 IN | OXYGEN SATURATION: 96 % | SYSTOLIC BLOOD PRESSURE: 110 MMHG | DIASTOLIC BLOOD PRESSURE: 78 MMHG | RESPIRATION RATE: 16 BRPM | BODY MASS INDEX: 31.78 KG/M2 | HEART RATE: 82 BPM | TEMPERATURE: 97.4 F

## 2020-03-19 DIAGNOSIS — B37.9 CANDIDA INFECTION: ICD-10-CM

## 2020-03-19 DIAGNOSIS — N89.8 VAGINAL PRURITUS: ICD-10-CM

## 2020-03-19 LAB
APPEARANCE UR: CLEAR
BACTERIA #/AREA URNS HPF: ABNORMAL /HPF
BILIRUB UR QL STRIP.AUTO: NEGATIVE
COLOR UR: YELLOW
EPI CELLS #/AREA URNS HPF: ABNORMAL /HPF
GLUCOSE UR STRIP.AUTO-MCNC: NEGATIVE MG/DL
HCG UR QL: NEGATIVE
HYALINE CASTS #/AREA URNS LPF: ABNORMAL /LPF
KETONES UR STRIP.AUTO-MCNC: NEGATIVE MG/DL
LEUKOCYTE ESTERASE UR QL STRIP.AUTO: NEGATIVE
MICRO URNS: ABNORMAL
NITRITE UR QL STRIP.AUTO: NEGATIVE
PH UR STRIP.AUTO: 5.5 [PH] (ref 5–8)
PROT UR QL STRIP: NEGATIVE MG/DL
RBC # URNS HPF: ABNORMAL /HPF
RBC UR QL AUTO: ABNORMAL
SP GR UR STRIP.AUTO: 1.02
UROBILINOGEN UR STRIP.AUTO-MCNC: 0.2 MG/DL
WBC #/AREA URNS HPF: ABNORMAL /HPF

## 2020-03-19 PROCEDURE — 700102 HCHG RX REV CODE 250 W/ 637 OVERRIDE(OP): Performed by: EMERGENCY MEDICINE

## 2020-03-19 PROCEDURE — 87491 CHLMYD TRACH DNA AMP PROBE: CPT

## 2020-03-19 PROCEDURE — 81001 URINALYSIS AUTO W/SCOPE: CPT

## 2020-03-19 PROCEDURE — 87591 N.GONORRHOEAE DNA AMP PROB: CPT

## 2020-03-19 PROCEDURE — 99284 EMERGENCY DEPT VISIT MOD MDM: CPT

## 2020-03-19 PROCEDURE — 81025 URINE PREGNANCY TEST: CPT

## 2020-03-19 PROCEDURE — A9270 NON-COVERED ITEM OR SERVICE: HCPCS | Performed by: EMERGENCY MEDICINE

## 2020-03-19 RX ORDER — FLUCONAZOLE 100 MG/1
200 TABLET ORAL ONCE
Status: COMPLETED | OUTPATIENT
Start: 2020-03-19 | End: 2020-03-19

## 2020-03-19 RX ORDER — NYSTATIN 100000 U/G
1 CREAM TOPICAL 2 TIMES DAILY
Qty: 1 TUBE | Refills: 0 | Status: SHIPPED | OUTPATIENT
Start: 2020-03-19 | End: 2020-06-02

## 2020-03-19 RX ADMIN — FLUCONAZOLE 200 MG: 100 TABLET ORAL at 14:22

## 2020-03-19 ASSESSMENT — FIBROSIS 4 INDEX: FIB4 SCORE: 0.38

## 2020-03-19 ASSESSMENT — LIFESTYLE VARIABLES: DO YOU DRINK ALCOHOL: NO

## 2020-03-19 NOTE — ED PROVIDER NOTES
ED Provider Note    CHIEF COMPLAINT  Chief Complaint   Patient presents with   • Vaginal Itching   • Rash       HPI  Rain CHE Rosado is a 25 y.o. female who presents to the emergency department for evaluation of a rash around her vagina and vaginal pruritus.  This is associated with some discharge.  The patient was seen at [facility] hospital about a week ago for UTI and treated with antibiotics.  Her dysuria has improved.  Since then she developed a rash that has been itching some discharge.  She denies any fevers chills nausea vomiting.  Denies any STD risk factors.  No flank pain.  Denies any other aggravating leaving factors or associated complaints.  Denies pregnancy.  She is 3 months postpartum.    REVIEW OF SYSTEMS  See HPI for further details. All other systems are negative.    PAST MEDICAL HISTORY  History reviewed. No pertinent past medical history.    FAMILY HISTORY  No family history on file.    SOCIAL HISTORY  Social History     Socioeconomic History   • Marital status: Single     Spouse name: Not on file   • Number of children: Not on file   • Years of education: Not on file   • Highest education level: Not on file   Occupational History   • Not on file   Social Needs   • Financial resource strain: Not on file   • Food insecurity     Worry: Never true     Inability: Never true   • Transportation needs     Medical: No     Non-medical: No   Tobacco Use   • Smoking status: Never Smoker   • Smokeless tobacco: Never Used   Substance and Sexual Activity   • Alcohol use: No   • Drug use: No   • Sexual activity: Not on file   Lifestyle   • Physical activity     Days per week: Not on file     Minutes per session: Not on file   • Stress: Not on file   Relationships   • Social connections     Talks on phone: Not on file     Gets together: Not on file     Attends Baptism service: Not on file     Active member of club or organization: Not on file     Attends meetings of clubs or organizations: Not on file  "    Relationship status: Not on file   • Intimate partner violence     Fear of current or ex partner: Not on file     Emotionally abused: Not on file     Physically abused: Not on file     Forced sexual activity: Not on file   Other Topics Concern   • Not on file   Social History Narrative   • Not on file       SURGICAL HISTORY  Past Surgical History:   Procedure Laterality Date   • REPEAT C SECTION  2019    Procedure:  SECTION, REPEAT;  Surgeon: Corinne E Capurro, M.D.;  Location: LABOR AND DELIVERY;  Service: Labor and Delivery       CURRENT MEDICATIONS  Home Medications     Reviewed by Cheryl Yoo R.N. (Registered Nurse) on 20 at 1227  Med List Status: Partial   Medication Last Dose Status        Patient Omar Taking any Medications                       ALLERGIES  No Known Allergies    PHYSICAL EXAM  VITAL SIGNS: /90   Pulse 98   Temp 36.3 °C (97.4 °F) (Temporal)   Resp 18   Ht 1.727 m (5' 8\")   Wt 95.1 kg (209 lb 10.5 oz)   SpO2 96%   BMI 31.88 kg/m²    Constitutional: Well developed, Well nourished, No acute distress, Non-toxic appearance.   HENT: Normocephalic, Atraumatic, Bilateral external ears normal, Oropharynx moist, No oral exudates, Nose normal.   Eyes: PERRL, EOMI, Conjunctiva normal, No discharge.     Cardiovascular: Normal heart rate, Normal rhythm, No murmurs, No rubs, No gallops.   Thorax & Lungs: Normal breath sounds, No respiratory distress, No wheezing,  Abdomen: Bowel sounds normal, Soft, No tenderness,  Genitalia: Performed the chaperone.  She has a severe candidal infection around her vagina with some satellite lesions.  This does not appear to be herpetic there is no cellulitis.  Minimal discharge in the vault although there is some.  No adnexal mass or tenderness  Musculoskeletal: Good range of motion in all major joints.   Neurologic: Alert, No focal deficits noted.   Psychiatric: Affect normal,    Results for orders placed or performed during the " hospital encounter of 03/19/20   URINALYSIS CULTURE, IF INDICATED   Result Value Ref Range    Color Yellow     Character Clear     Specific Gravity 1.017 <1.035    Ph 5.5 5.0 - 8.0    Glucose Negative Negative mg/dL    Ketones Negative Negative mg/dL    Protein Negative Negative mg/dL    Bilirubin Negative Negative    Urobilinogen, Urine 0.2 Negative    Nitrite Negative Negative    Leukocyte Esterase Negative Negative    Occult Blood Trace (A) Negative    Micro Urine Req Microscopic    HCG QUALITATIVE UR (Lab)   Result Value Ref Range    Beta-Hcg Urine Negative Negative   CHLAMYDIA & GC BY PCR   Result Value Ref Range    Source Urine    URINE MICROSCOPIC (W/UA)   Result Value Ref Range    WBC 0-2 /hpf    RBC 5-10 (A) /hpf    Bacteria Few (A) None /hpf    Epithelial Cells Few /hpf    Hyaline Cast 0-2 /lpf        COURSE & MEDICAL DECISION MAKING  Pertinent Labs & Imaging studies reviewed. (See chart for details)    Patient presents with a candidal infection in the groin area.  She also has some component of yeast infection and for that she is given a dose of oral Diflucan.  I think is likely related to her recent antibiotic use.  The patient will be put on a topical nystatin cream as well.    She is counseled follow-up with her primary care doctor.  She will return if worsening rash or other concerns.  We will call her with her wet mount results if they are abnormal.  She is not pregnant she has no signs of UTI.  I do not think this is likely to be an STD.  The patient disturbance postpartum and has not had intercourse since that time.    She denies any other acute concerns or complaints.  Questions are answered she agreed with the plan she is discharged in good condition.    The patient was noted to have elevated blood pressure while in the ER and was counseled to see their doctor within one wee to have this rechecked.    [address] [id number] [phone]            FINAL  IMPRESSION  1. Vaginal pruritus     2. Candida infection         2.   3.         Electronically signed by: Elier Reyes M.D., 3/19/2020 1:04 PM

## 2020-03-19 NOTE — ED NOTES
Pt ambulated to restroom with steady gait, Urine obtained and sent.  Pelvic exam setup completed and ready for pt at this time.  ERP and RN chaperone at bedside for exam..

## 2020-03-19 NOTE — DISCHARGE INSTRUCTIONS
Follow-up with your doctor.  We will call you with results of analysis abnormal.  Return for rash, more discharge, or other concerns.  Follow-up with your doctor or [place]

## 2020-03-20 LAB
C TRACH DNA SPEC QL NAA+PROBE: NEGATIVE
N GONORRHOEA DNA SPEC QL NAA+PROBE: NEGATIVE
SPECIMEN SOURCE: NORMAL

## 2020-04-04 ENCOUNTER — HOSPITAL ENCOUNTER (EMERGENCY)
Dept: HOSPITAL 8 - ED | Age: 25
Discharge: HOME | End: 2020-04-04
Payer: MEDICAID

## 2020-04-04 VITALS — WEIGHT: 211.42 LBS | BODY MASS INDEX: 32.04 KG/M2 | HEIGHT: 68 IN

## 2020-04-04 VITALS — DIASTOLIC BLOOD PRESSURE: 80 MMHG | SYSTOLIC BLOOD PRESSURE: 125 MMHG
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
## 2020-04-04 DIAGNOSIS — S39.012A: Primary | ICD-10-CM
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
## 2020-04-04 DIAGNOSIS — S29.012A: ICD-10-CM

## 2020-04-04 DIAGNOSIS — K21.9: ICD-10-CM
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
## 2020-04-04 PROCEDURE — 71045 X-RAY EXAM CHEST 1 VIEW: CPT

## 2020-04-04 PROCEDURE — 99283 EMERGENCY DEPT VISIT LOW MDM: CPT

## 2020-04-23 ENCOUNTER — TELEPHONE (OUTPATIENT)
Dept: SCHEDULING | Facility: IMAGING CENTER | Age: 25
End: 2020-04-23

## 2020-04-24 ENCOUNTER — HOSPITAL ENCOUNTER (EMERGENCY)
Dept: HOSPITAL 8 - ED | Age: 25
Discharge: HOME | End: 2020-04-24
Payer: MEDICAID

## 2020-04-24 VITALS — BODY MASS INDEX: 32.71 KG/M2 | WEIGHT: 215.83 LBS | HEIGHT: 68 IN

## 2020-04-24 VITALS — DIASTOLIC BLOOD PRESSURE: 84 MMHG | SYSTOLIC BLOOD PRESSURE: 137 MMHG

## 2020-04-24 DIAGNOSIS — S16.1XXA: Primary | ICD-10-CM
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
## 2020-04-24 DIAGNOSIS — K21.9: ICD-10-CM

## 2020-04-24 DIAGNOSIS — M62.830: ICD-10-CM

## 2020-04-24 PROCEDURE — 72050 X-RAY EXAM NECK SPINE 4/5VWS: CPT

## 2020-04-24 PROCEDURE — 99283 EMERGENCY DEPT VISIT LOW MDM: CPT

## 2020-05-07 ENCOUNTER — HOSPITAL ENCOUNTER (EMERGENCY)
Facility: MEDICAL CENTER | Age: 25
End: 2020-05-07
Attending: EMERGENCY MEDICINE
Payer: MEDICAID

## 2020-05-07 VITALS
OXYGEN SATURATION: 97 % | TEMPERATURE: 98.3 F | SYSTOLIC BLOOD PRESSURE: 129 MMHG | BODY MASS INDEX: 30.41 KG/M2 | WEIGHT: 200 LBS | RESPIRATION RATE: 18 BRPM | DIASTOLIC BLOOD PRESSURE: 95 MMHG | HEART RATE: 101 BPM

## 2020-05-07 DIAGNOSIS — H61.22 IMPACTED CERUMEN OF LEFT EAR: ICD-10-CM

## 2020-05-07 DIAGNOSIS — J06.9 VIRAL URI WITH COUGH: ICD-10-CM

## 2020-05-07 PROCEDURE — 99281 EMR DPT VST MAYX REQ PHY/QHP: CPT

## 2020-05-07 RX ORDER — ASPIRIN 81 MG
6 TABLET, DELAYED RELEASE (ENTERIC COATED) ORAL 2 TIMES DAILY
Qty: 1 BOTTLE | Refills: 0 | Status: SHIPPED | OUTPATIENT
Start: 2020-05-07 | End: 2020-06-02

## 2020-05-07 RX ORDER — DIAZEPAM 5 MG/1
5 TABLET ORAL EVERY 6 HOURS PRN
COMMUNITY
End: 2020-06-02

## 2020-05-07 ASSESSMENT — FIBROSIS 4 INDEX: FIB4 SCORE: 0.38

## 2020-05-07 NOTE — ED TRIAGE NOTES
Presents with complaint of bilateral ear pain and occasional cough for the past few days. Endorses feeling as if her head is congested. Denies fevers, chills, SOB. Breathing nonlabored, regular. Mask applied prior to entering tent.

## 2020-05-07 NOTE — ED NOTES
Discharged to home. Verbalized understanding of all discharge instructions, education, medication,a nd follow-up care. Ambulated out of tent without difficulty.

## 2020-05-07 NOTE — ED PROVIDER NOTES
ED Provider Note        CHIEF COMPLAINT  Chief Complaint   Patient presents with   • Ear Pain   • Cough       HPI  Rain Rosado is a 25 y.o. female who presents complaining of left-sided ear pain and a nonproductive cough for the past 2 days.  The patient's infant son was sick with similar symptoms.    REVIEW OF SYSTEMS  See HPI for further details.  No fever no chills.  Positive cough.  No rhinorrhea.  All other systems are negative.    PAST MEDICAL HISTORY  History reviewed. No pertinent past medical history.    FAMILY HISTORY  History reviewed. No pertinent family history.    SOCIAL HISTORY  Social History     Socioeconomic History   • Marital status: Single     Spouse name: Not on file   • Number of children: Not on file   • Years of education: Not on file   • Highest education level: Not on file   Occupational History   • Not on file   Social Needs   • Financial resource strain: Not on file   • Food insecurity     Worry: Never true     Inability: Never true   • Transportation needs     Medical: No     Non-medical: No   Tobacco Use   • Smoking status: Never Smoker   • Smokeless tobacco: Never Used   Substance and Sexual Activity   • Alcohol use: No   • Drug use: No   • Sexual activity: Not on file   Lifestyle   • Physical activity     Days per week: Not on file     Minutes per session: Not on file   • Stress: Not on file   Relationships   • Social connections     Talks on phone: Not on file     Gets together: Not on file     Attends Restoration service: Not on file     Active member of club or organization: Not on file     Attends meetings of clubs or organizations: Not on file     Relationship status: Not on file   • Intimate partner violence     Fear of current or ex partner: Not on file     Emotionally abused: Not on file     Physically abused: Not on file     Forced sexual activity: Not on file   Other Topics Concern   • Not on file   Social History Narrative   • Not on file       SURGICAL  HISTORY  Past Surgical History:   Procedure Laterality Date   • REPEAT C SECTION  2019    Procedure:  SECTION, REPEAT;  Surgeon: Corinne E Capurro, M.D.;  Location: LABOR AND DELIVERY;  Service: Labor and Delivery       CURRENT MEDICATIONS  Home Medications    **Home medications have not yet been reviewed for this encounter**         ALLERGIES  No Known Allergies    PHYSICAL EXAM  VITAL SIGNS: /95   Pulse (!) 101   Temp 36.8 °C (98.3 °F) (Temporal)   Resp 18   Wt 90.7 kg (200 lb)   SpO2 97%   BMI 30.41 kg/m²   Constitutional: Well developed, Well nourished, No acute distress,   HENT: Normocephalic, Atraumatic, Bilateral external ears normal, Oropharynx moist, No oral exudates, Nose normal.  Left cerumen impaction  Eyes: AGATHA, EOMI, Conjunctiva normal, No discharge.   Neck: Normal range of motion, No tenderness, Supple, No stridor. No nuchal rigidity  Lymphatic: No lymphadenopathy noted.   Cardiovascular: Regular rate and rhythm  Thorax & Lungs: Clear without wheezing  Abdomen: Bowel sounds normal, Soft, No tenderness, No masses,   Neurologic: Alert & oriented x 3, Normal motor function, Normal sensory function, No focal deficits noted.         COURSE & MEDICAL DECISION MAKING  Pertinent Labs & Imaging studies reviewed. (See chart for details)  Patient has an upper respiratory infection.  I do not see any signs of otitis media.  I think this is a left cerumen impaction.  Patient will be started on drops for this and was reassured and discharged home in stable condition      FINAL IMPRESSION  1.  URI  2.  Left cerumen impaction  3.      Please note that this dictation was created using voice recognition software. I have worked with consultants from the vendor as well as technical experts from [facility] [employer] to optimize the interface. I have made every reasonable attempt to correct obvious errors, but I expect that there are errors of grammar and possibly content that I did not discover before  finalizing the note.      Electronically signed by: Hany Epstein M.D., 5/7/2020 1:58 PM

## 2020-06-02 ENCOUNTER — TELEMEDICINE (OUTPATIENT)
Dept: MEDICAL GROUP | Facility: MEDICAL CENTER | Age: 25
End: 2020-06-02
Attending: FAMILY MEDICINE
Payer: MEDICAID

## 2020-06-02 VITALS — HEIGHT: 68 IN | BODY MASS INDEX: 31.07 KG/M2 | WEIGHT: 205 LBS

## 2020-06-02 DIAGNOSIS — R42 DIZZINESS: ICD-10-CM

## 2020-06-02 DIAGNOSIS — R53.83 OTHER FATIGUE: ICD-10-CM

## 2020-06-02 PROCEDURE — 99203 OFFICE O/P NEW LOW 30 MIN: CPT | Performed by: FAMILY MEDICINE

## 2020-06-02 ASSESSMENT — FIBROSIS 4 INDEX: FIB4 SCORE: 0.38

## 2020-06-02 NOTE — PROGRESS NOTES
Telemedicine Video Visit: New Patient   This Remote Face to Face encounter for a new patient was conducted via Zoom. Given the importance of social distancing and other strategies recommended to reduce the risk of COVID-19 transmission, I am providing medical care to this patient via audio/video visit in place of an in person visit at the request of the patient. Verbal consent to telehealth, risks, benefits, and consequences were discussed. Patient retains the right to withdraw at any time. All existing confidentiality protections apply. The patient has access to all transmitted medical information. No dissemination of any patient images or information to other entities without further written consent.  Subjective:     Chief Complaint   Patient presents with   • Establish Care   • Fatigue       Rain Rosado is a 25 y.o. female presenting for establishing care visit, and evaluation and management of:       Other fatigue/. Dizziness    Patient reports that he has been experiencing fatigue tiredness headache and dizziness for the past 3 to 4 months, started after delivering her baby who is 5 months old now.  Patient reports sometimes chest discomfort and shortness of breath on exertion.  Did not do any blood work after her delivery, she had her delivery by .  She said she was not sure about the blood loss.    ROS  Constitutional: Negative for fever, chills and malaise/fatigue.   HENT: Negative for congestion.    Eyes: Negative for pain.   Respiratory: Negative for cough and shortness of breath.  Only shortness of breath with exertion  Cardiovascular: Negative for leg swelling.   Gastrointestinal: Negative for nausea, vomiting, abdominal pain and diarrhea.   Genitourinary: Negative for dysuria and hematuria.   Skin: Negative for rash.   Neurological: Negative for dizziness, focal weakness and headaches.   Endo/Heme/Allergies: Does not bruise/bleed easily.   Psychiatric/Behavioral: Negative for  "depression.  The patient is not nervous/anxious.    No Known Allergies    Current medicines (including changes today)  No current outpatient medications on file.     No current facility-administered medications for this visit.        Patient Active Problem List    Diagnosis Date Noted   • S/P  section 2019     Priority: High   • Acute post-operative pain 2019     Priority: High   • Pre-eclampsia in third trimester 2019     Priority: High       Family History   Problem Relation Age of Onset   • Diabetes Father        She  has no past medical history on file.  She  has a past surgical history that includes repeat c section (2019).       Objective:   Vitals obtained by patient:  Ht 1.727 m (5' 8\")   Wt 93 kg (205 lb)   LMP 2020 (Within Days)   Breastfeeding No   BMI 31.17 kg/m²     Physical Exam:  Constitutional: Alert, no distress, well-groomed.  Skin: No rashes in visible areas.  Eye: Round. Conjunctiva clear, lids normal. No icterus.   ENMT: Lips pink without lesions, good dentition, moist mucous membranes. Phonation normal.  Neck: No masses, no thyromegaly. Moves freely without pain.  CV: Pulse as reported by patient  Respiratory: Unlabored respiratory effort, no cough or audible wheeze  Psych: Alert and oriented x3, normal affect and mood.     Assessment and Plan:   The following treatment plan was discussed:     1. Other fatigue  Most likely related to anemia, will rule out other causes of fatigue.  - CBC WITH DIFFERENTIAL; Future  - Comp Metabolic Panel; Future  - TSH; Future  - VITAMIN B12; Future  - IRON/TOTAL IRON BIND; Future  - FERRITIN; Future  - HEMOGLOBIN A1C; Future    2. Dizziness  Rule out anemia  - CBC WITH DIFFERENTIAL; Future  - Comp Metabolic Panel; Future  - TSH; Future  - VITAMIN B12; Future  - IRON/TOTAL IRON BIND; Future  - FERRITIN; Future  - HEMOGLOBIN A1C; Future            Follow-up: No follow-ups on file.    Face to Face Video Visit:   I spent " 25minutes with patient/guardian and I conducted this visit with audio and video present.  Armand Tucker M.D.

## 2020-08-29 ENCOUNTER — APPOINTMENT (OUTPATIENT)
Dept: RADIOLOGY | Facility: MEDICAL CENTER | Age: 25
End: 2020-08-29
Attending: EMERGENCY MEDICINE
Payer: MEDICAID

## 2020-08-29 ENCOUNTER — HOSPITAL ENCOUNTER (EMERGENCY)
Facility: MEDICAL CENTER | Age: 25
End: 2020-08-30
Attending: EMERGENCY MEDICINE
Payer: MEDICAID

## 2020-08-29 DIAGNOSIS — R55 NEAR SYNCOPE: ICD-10-CM

## 2020-08-29 LAB
ALBUMIN SERPL BCP-MCNC: 4 G/DL (ref 3.2–4.9)
ALBUMIN/GLOB SERPL: 1.2 G/DL
ALP SERPL-CCNC: 75 U/L (ref 30–99)
ALT SERPL-CCNC: 11 U/L (ref 2–50)
ANION GAP SERPL CALC-SCNC: 13 MMOL/L (ref 7–16)
AST SERPL-CCNC: 13 U/L (ref 12–45)
BASOPHILS # BLD AUTO: 0.4 % (ref 0–1.8)
BASOPHILS # BLD: 0.04 K/UL (ref 0–0.12)
BILIRUB SERPL-MCNC: <0.2 MG/DL (ref 0.1–1.5)
BUN SERPL-MCNC: 15 MG/DL (ref 8–22)
CALCIUM SERPL-MCNC: 9.1 MG/DL (ref 8.5–10.5)
CHLORIDE SERPL-SCNC: 105 MMOL/L (ref 96–112)
CO2 SERPL-SCNC: 21 MMOL/L (ref 20–33)
CREAT SERPL-MCNC: 0.72 MG/DL (ref 0.5–1.4)
EKG IMPRESSION: NORMAL
EOSINOPHIL # BLD AUTO: 0.06 K/UL (ref 0–0.51)
EOSINOPHIL NFR BLD: 0.6 % (ref 0–6.9)
ERYTHROCYTE [DISTWIDTH] IN BLOOD BY AUTOMATED COUNT: 38.6 FL (ref 35.9–50)
GLOBULIN SER CALC-MCNC: 3.3 G/DL (ref 1.9–3.5)
GLUCOSE SERPL-MCNC: 114 MG/DL (ref 65–99)
HCG UR QL: NEGATIVE
HCT VFR BLD AUTO: 36.8 % (ref 37–47)
HGB BLD-MCNC: 12 G/DL (ref 12–16)
IMM GRANULOCYTES # BLD AUTO: 0.02 K/UL (ref 0–0.11)
IMM GRANULOCYTES NFR BLD AUTO: 0.2 % (ref 0–0.9)
LYMPHOCYTES # BLD AUTO: 2.61 K/UL (ref 1–4.8)
LYMPHOCYTES NFR BLD: 27.2 % (ref 22–41)
MAGNESIUM SERPL-MCNC: 2 MG/DL (ref 1.5–2.5)
MCH RBC QN AUTO: 26.1 PG (ref 27–33)
MCHC RBC AUTO-ENTMCNC: 32.6 G/DL (ref 33.6–35)
MCV RBC AUTO: 80.2 FL (ref 81.4–97.8)
MONOCYTES # BLD AUTO: 0.53 K/UL (ref 0–0.85)
MONOCYTES NFR BLD AUTO: 5.5 % (ref 0–13.4)
NEUTROPHILS # BLD AUTO: 6.35 K/UL (ref 2–7.15)
NEUTROPHILS NFR BLD: 66.1 % (ref 44–72)
NRBC # BLD AUTO: 0 K/UL
NRBC BLD-RTO: 0 /100 WBC
PLATELET # BLD AUTO: 300 K/UL (ref 164–446)
PMV BLD AUTO: 10.8 FL (ref 9–12.9)
POTASSIUM SERPL-SCNC: 3.9 MMOL/L (ref 3.6–5.5)
PROT SERPL-MCNC: 7.3 G/DL (ref 6–8.2)
RBC # BLD AUTO: 4.59 M/UL (ref 4.2–5.4)
SODIUM SERPL-SCNC: 139 MMOL/L (ref 135–145)
WBC # BLD AUTO: 9.6 K/UL (ref 4.8–10.8)

## 2020-08-29 PROCEDURE — 93005 ELECTROCARDIOGRAM TRACING: CPT | Performed by: EMERGENCY MEDICINE

## 2020-08-29 PROCEDURE — 83735 ASSAY OF MAGNESIUM: CPT

## 2020-08-29 PROCEDURE — 71045 X-RAY EXAM CHEST 1 VIEW: CPT

## 2020-08-29 PROCEDURE — 81025 URINE PREGNANCY TEST: CPT

## 2020-08-29 PROCEDURE — 99284 EMERGENCY DEPT VISIT MOD MDM: CPT

## 2020-08-29 PROCEDURE — 99283 EMERGENCY DEPT VISIT LOW MDM: CPT

## 2020-08-29 PROCEDURE — 80053 COMPREHEN METABOLIC PANEL: CPT

## 2020-08-29 PROCEDURE — 84443 ASSAY THYROID STIM HORMONE: CPT

## 2020-08-29 PROCEDURE — 85025 COMPLETE CBC W/AUTO DIFF WBC: CPT

## 2020-08-29 ASSESSMENT — FIBROSIS 4 INDEX: FIB4 SCORE: 0.38

## 2020-08-30 VITALS
HEART RATE: 91 BPM | SYSTOLIC BLOOD PRESSURE: 116 MMHG | TEMPERATURE: 98.4 F | BODY MASS INDEX: 31.83 KG/M2 | OXYGEN SATURATION: 100 % | RESPIRATION RATE: 18 BRPM | WEIGHT: 210 LBS | DIASTOLIC BLOOD PRESSURE: 64 MMHG | HEIGHT: 68 IN

## 2020-08-30 LAB — TSH SERPL DL<=0.005 MIU/L-ACNC: 1.31 UIU/ML (ref 0.38–5.33)

## 2020-08-30 NOTE — ED PROVIDER NOTES
"ED Provider Note    CHIEF COMPLAINT  Chief Complaint   Patient presents with   • Dizziness     with anxiety and sob accompanies dizzy spells. Patient states having been seen by her PCP for this and that she may have anemia.         HPI    Primary care provider: Armand Tucker M.D.   History obtained from: Patient  History limited by: None     Rain CHE Rosado is a 25 y.o. female who presents to the ED by EMS complaining of sudden onset of dizziness described as \"like I am going to pass out\" without actual syncope shortly prior to arrival.  Patient reports that she felt like she could not breathe and that her heart was \"beating hard\" and she was feeling shaky and could not move.  She reports that this lasted approximately 5 minutes.  No prior history of similar episodes.  Patient states that she was having \"problems with my baby's daddy\" and the symptoms started shortly thereafter.  Patient reports that she currently feels fine with no further symptoms.  She denies recent illness/fever/cough/congestion/nausea/vomiting/dysuria.  She denies possibility of pregnancy and has had regular periods.  She does suffer from constipation at times.  She denies any pain except having headache when she had the episode but no pain currently.  Patient reports that she was told that she may have anemia by her doctor.    REVIEW OF SYSTEMS  Please see HPI for pertinent positives/negatives.  All other systems reviewed and are negative.     PAST MEDICAL HISTORY  No past medical history on file.     SURGICAL HISTORY  Past Surgical History:   Procedure Laterality Date   • REPEAT C SECTION  2019    Procedure:  SECTION, REPEAT;  Surgeon: Corinne E Capurro, M.D.;  Location: LABOR AND DELIVERY;  Service: Labor and Delivery        SOCIAL HISTORY  Social History     Tobacco Use   • Smoking status: Never Smoker   • Smokeless tobacco: Never Used   Substance and Sexual Activity   • Alcohol use: No   • Drug use: No   • Sexual " "activity: Not Currently     Partners: Male     Birth control/protection: Condom        FAMILY HISTORY  Family History   Problem Relation Age of Onset   • Diabetes Father         CURRENT MEDICATIONS  Home Medications    **Home medications have not yet been reviewed for this encounter**          ALLERGIES  No Known Allergies     PHYSICAL EXAM  VITAL SIGNS: /64   Pulse 91   Temp 36.9 °C (98.4 °F) (Temporal)   Resp 18   Ht 1.727 m (5' 8\")   Wt 95.3 kg (210 lb)   LMP 08/05/2020   SpO2 100%   BMI 31.93 kg/m²  @AMERICO[[id number]::@     Pulse ox interpretation: 100% I interpret this pulse ox as normal     Cardiac monitor interpretation: Sinus rhythm with heart rate in the 90s as interpreted by me.  The patient presented with near syncope and cardiac monitor was ordered to monitor for dysrhythmia.    Constitutional: Well developed, well nourished, alert in no apparent distress, nontoxic appearance    HENT: No external signs of trauma, normocephalic  Eyes: PERRL, EOMI, vision visual fields are grossly intact bilaterally, conjunctiva without erythema, no discharge, no icterus    Neck: Soft and supple, trachea midline, no stridor, no tenderness, no LAD, no JVD, good ROM    Cardiovascular: Regular rate and rhythm, no murmurs/rubs/gallops, strong distal pulses and good perfusion    Thorax & Lungs: No respiratory distress, CTAB   Abdomen: Soft, nontender, nondistended, no guarding, no rebound, normal BS    Back: No CVAT    Extremities: No cyanosis, no edema, no gross deformity, good ROM, no tenderness, intact distal pulses with brisk cap refill    Skin: Warm, dry, no pallor/cyanosis, no rash noted    Lymphatic: No lymphadenopathy noted    Neuro: Alert and oriented to person, place, and time.  GCS 15.  CN II-XII grossly intact.  Normal speech.  Equal strength bilateral UE/LE.  Sensation intact to touch.  No cerebellar signs.  Normal gait.    Psychiatric: Cooperative, normal mood and affect, normal judgement, appropriate for " clinical situation        DIAGNOSTIC STUDIES / PROCEDURES    EKG  12 Lead EKG obtained at 2156 and interpreted by me:   Rate: 98   Rhythm: Sinus rhythm   Ectopy: None  Intervals: Normal   Axis: Normal   QRS: Normal   ST segments: Normal  T Waves: Normal    Clinical Impression: Sinus rhythm without acute ischemic changes or dysrhythmia  Compared to February 17, 2020 without significant change      LABS  All labs reviewed by me.     Results for orders placed or performed during the hospital encounter of 08/29/20   CBC WITH DIFFERENTIAL   Result Value Ref Range    WBC 9.6 4.8 - 10.8 K/uL    RBC 4.59 4.20 - 5.40 M/uL    Hemoglobin 12.0 12.0 - 16.0 g/dL    Hematocrit 36.8 (L) 37.0 - 47.0 %    MCV 80.2 (L) 81.4 - 97.8 fL    MCH 26.1 (L) 27.0 - 33.0 pg    MCHC 32.6 (L) 33.6 - 35.0 g/dL    RDW 38.6 35.9 - 50.0 fL    Platelet Count 300 164 - 446 K/uL    MPV 10.8 9.0 - 12.9 fL    Neutrophils-Polys 66.10 44.00 - 72.00 %    Lymphocytes 27.20 22.00 - 41.00 %    Monocytes 5.50 0.00 - 13.40 %    Eosinophils 0.60 0.00 - 6.90 %    Basophils 0.40 0.00 - 1.80 %    Immature Granulocytes 0.20 0.00 - 0.90 %    Nucleated RBC 0.00 /100 WBC    Neutrophils (Absolute) 6.35 2.00 - 7.15 K/uL    Lymphs (Absolute) 2.61 1.00 - 4.80 K/uL    Monos (Absolute) 0.53 0.00 - 0.85 K/uL    Eos (Absolute) 0.06 0.00 - 0.51 K/uL    Baso (Absolute) 0.04 0.00 - 0.12 K/uL    Immature Granulocytes (abs) 0.02 0.00 - 0.11 K/uL    NRBC (Absolute) 0.00 K/uL   COMP METABOLIC PANEL   Result Value Ref Range    Sodium 139 135 - 145 mmol/L    Potassium 3.9 3.6 - 5.5 mmol/L    Chloride 105 96 - 112 mmol/L    Co2 21 20 - 33 mmol/L    Anion Gap 13.0 7.0 - 16.0    Glucose 114 (H) 65 - 99 mg/dL    Bun 15 8 - 22 mg/dL    Creatinine 0.72 0.50 - 1.40 mg/dL    Calcium 9.1 8.5 - 10.5 mg/dL    AST(SGOT) 13 12 - 45 U/L    ALT(SGPT) 11 2 - 50 U/L    Alkaline Phosphatase 75 30 - 99 U/L    Total Bilirubin <0.2 0.1 - 1.5 mg/dL    Albumin 4.0 3.2 - 4.9 g/dL    Total Protein 7.3 6.0 - 8.2  g/dL    Globulin 3.3 1.9 - 3.5 g/dL    A-G Ratio 1.2 g/dL   MAGNESIUM   Result Value Ref Range    Magnesium 2.0 1.5 - 2.5 mg/dL   TSH   Result Value Ref Range    TSH 1.310 0.380 - 5.330 uIU/mL   BETA-HCG QUALITATIVE URINE   Result Value Ref Range    Beta-Hcg Urine Negative Negative   ESTIMATED GFR   Result Value Ref Range    GFR If African American >60 >60 mL/min/1.73 m 2    GFR If Non African American >60 >60 mL/min/1.73 m 2   EKG   Result Value Ref Range    Report       [facility] Emergency Dept.    Test Date:  2020  Pt Name:    HCAVEZ PEREZ        Department: ER  MRN:        [id number]                      Room:        02  Gender:     Female                       Technician: [id number]  :        1995                   Requested By:ER TRIAGE PROTOCOL  Order #:    [id number]                    Reading MD:    Measurements  Intervals                                Axis  Rate:       98                           P:          54  PA:         140                          QRS:        57  QRSD:       88                           T:          23  QT:         344  QTc:        440    Interpretive Statements  SINUS RHYTHM  CONSIDER LEFT ATRIAL ABNORMALITY  Compared to ECG 2020 22:47:59  No significant changes          RADIOLOGY  The radiologist's interpretation of all radiological studies have been reviewed by me.     DX-CHEST-PORTABLE (1 VIEW)   Final Result      No acute cardiac or pulmonary abnormalities are identified.             COURSE & MEDICAL DECISION MAKING  Nursing notes, VS, PMSFHx reviewed in chart.     Review of past medical records shows the patient was last seen in this ED May 7, 2020 for ear pain and cough.      Differential diagnoses considered include but are not limited to: dysrhythmia, anemia, dehydration, near syncope, anxiety       The patient was ruled out for PE by PERC criteria:    Age < 50  HR < 100  RA sat > 94%  No hx of DVT/PE  No hemoptysis  No recent  surgery/trauma (4 weeks)  No estrogen/BCP  No unilateral leg swelling        History and physical exam as above.  EKG without evidence for acute ischemic changes or dysrhythmia.  Chest x-ray without evidence for acute abnormality.  Labs are fairly unremarkable.  I discussed the findings with the patient and mother.  This is a pleasant well-appearing patient in no acute distress and nontoxic in appearance with a benign exam.  No worrisome dysrhythmia during her ED stay.  No focal neurological findings or concerning features to suggest acute neurologic disorder or need for emergent imaging.  Her symptoms appear more consistent with anxiety/panic attack.  However, I discussed with patient worrisome signs and symptoms and return to ED precautions and outpatient follow-up for further evaluation.  She verbalized understanding and agreed with plan of care with no further questions or concerns.      The patient is referred to a primary physician for blood pressure management, diabetic screening, and for all other preventative health concerns.       FINAL IMPRESSION  1. Near syncope Acute          DISPOSITION  Patient will be discharged home in stable condition.       FOLLOW UP  Armand Tucker M.D.  [address] [id number] [phone]    Call in 1 day      [facility], Emergency Dept  [address] [facility] [address] [phone]    If symptoms worsen         OUTPATIENT MEDICATIONS  There are no discharge medications for this patient.         Electronically signed by: Jose Mckinnon D.O., 8/29/2020 10:11 PM      Portions of this record were made with voice recognition software.  Despite my review, spelling/grammar/context errors may still remain.  Interpretation of this chart should be taken in this context.

## 2020-08-30 NOTE — ED TRIAGE NOTES
"Chief Complaint   Patient presents with   • Dizziness     with anxiety and sob accompanies dizzy spells. Patient states having been seen by her PCP for this and that she may have anemia.        /75   Pulse (!) 104   Temp 36.8 °C (98.2 °F) (Temporal)   Resp 18   Ht 1.727 m (5' 8\")   Wt 95.3 kg (210 lb)   LMP 08/05/2020   SpO2 100%   BMI 31.93 kg/m²     Patient BIBEMS with above complaint. Per EMS patient has transient dizzy spells and then gets sob with the spells. Patient currently NAD, VSS. FSBS 150 patient states she just ate a big meal. EKG done on arrival.   "

## 2021-02-01 ENCOUNTER — OFFICE VISIT (OUTPATIENT)
Dept: MEDICAL GROUP | Facility: MEDICAL CENTER | Age: 26
End: 2021-02-01
Attending: NURSE PRACTITIONER
Payer: MEDICAID

## 2021-02-01 VITALS
OXYGEN SATURATION: 99 % | SYSTOLIC BLOOD PRESSURE: 108 MMHG | HEIGHT: 68 IN | HEART RATE: 92 BPM | BODY MASS INDEX: 32.16 KG/M2 | WEIGHT: 212.2 LBS | DIASTOLIC BLOOD PRESSURE: 70 MMHG | TEMPERATURE: 96.9 F | RESPIRATION RATE: 16 BRPM

## 2021-02-01 DIAGNOSIS — Z23 NEED FOR VACCINATION: ICD-10-CM

## 2021-02-01 DIAGNOSIS — Z76.89 ENCOUNTER TO ESTABLISH CARE: ICD-10-CM

## 2021-02-01 DIAGNOSIS — G89.29 CHRONIC MIDLINE THORACIC BACK PAIN: ICD-10-CM

## 2021-02-01 DIAGNOSIS — M54.6 CHRONIC MIDLINE THORACIC BACK PAIN: ICD-10-CM

## 2021-02-01 DIAGNOSIS — F41.9 ANXIETY: ICD-10-CM

## 2021-02-01 PROCEDURE — 90686 IIV4 VACC NO PRSV 0.5 ML IM: CPT

## 2021-02-01 PROCEDURE — 99213 OFFICE O/P EST LOW 20 MIN: CPT | Performed by: NURSE PRACTITIONER

## 2021-02-01 PROCEDURE — 99202 OFFICE O/P NEW SF 15 MIN: CPT | Performed by: NURSE PRACTITIONER

## 2021-02-01 PROCEDURE — 99212 OFFICE O/P EST SF 10 MIN: CPT | Mod: 25 | Performed by: NURSE PRACTITIONER

## 2021-02-01 RX ORDER — ESCITALOPRAM OXALATE 10 MG/1
10 TABLET ORAL
Qty: 30 TAB | Refills: 2 | Status: SHIPPED | OUTPATIENT
Start: 2021-02-01 | End: 2022-08-30 | Stop reason: SDUPTHER

## 2021-02-01 RX ORDER — IBUPROFEN 800 MG/1
800 TABLET ORAL EVERY 8 HOURS PRN
Qty: 90 TAB | Refills: 5 | Status: SHIPPED | OUTPATIENT
Start: 2021-02-01 | End: 2022-08-30 | Stop reason: SDUPTHER

## 2021-02-01 RX ORDER — CYCLOBENZAPRINE HCL 5 MG
5-10 TABLET ORAL 3 TIMES DAILY PRN
Qty: 90 TAB | Refills: 3 | Status: SHIPPED | OUTPATIENT
Start: 2021-02-01 | End: 2021-02-04 | Stop reason: SDUPTHER

## 2021-02-01 RX ORDER — HYDROXYZINE HYDROCHLORIDE 25 MG/1
25-50 TABLET, FILM COATED ORAL 3 TIMES DAILY PRN
Qty: 90 TAB | Refills: 11 | Status: SHIPPED | OUTPATIENT
Start: 2021-02-01 | End: 2022-08-05 | Stop reason: SDUPTHER

## 2021-02-01 SDOH — HEALTH STABILITY: MENTAL HEALTH: HOW OFTEN DO YOU HAVE A DRINK CONTAINING ALCOHOL?: NEVER

## 2021-02-01 ASSESSMENT — ENCOUNTER SYMPTOMS
DIARRHEA: 0
FEVER: 0
CHILLS: 0
SHORTNESS OF BREATH: 1
COUGH: 0
ABDOMINAL PAIN: 0
BLOOD IN STOOL: 0
DIZZINESS: 1
WEIGHT LOSS: 0
CONSTIPATION: 0
BACK PAIN: 1
WHEEZING: 0
PALPITATIONS: 0

## 2021-02-01 ASSESSMENT — PATIENT HEALTH QUESTIONNAIRE - PHQ9
SUM OF ALL RESPONSES TO PHQ QUESTIONS 1-9: 5
CLINICAL INTERPRETATION OF PHQ2 SCORE: 3
5. POOR APPETITE OR OVEREATING: 0 - NOT AT ALL

## 2021-02-01 ASSESSMENT — ANXIETY QUESTIONNAIRES
3. WORRYING TOO MUCH ABOUT DIFFERENT THINGS: SEVERAL DAYS
7. FEELING AFRAID AS IF SOMETHING AWFUL MIGHT HAPPEN: SEVERAL DAYS
6. BECOMING EASILY ANNOYED OR IRRITABLE: NEARLY EVERY DAY
1. FEELING NERVOUS, ANXIOUS, OR ON EDGE: SEVERAL DAYS
4. TROUBLE RELAXING: MORE THAN HALF THE DAYS
GAD7 TOTAL SCORE: 9
2. NOT BEING ABLE TO STOP OR CONTROL WORRYING: SEVERAL DAYS
5. BEING SO RESTLESS THAT IT IS HARD TO SIT STILL: NOT AT ALL

## 2021-02-01 ASSESSMENT — FIBROSIS 4 INDEX: FIB4 SCORE: 0.33

## 2021-02-01 NOTE — PATIENT INSTRUCTIONS
Lexapro- every to reduce anxiety level. Take one half tab at bedtime for the first week, then increase to a ful tab (10 mg).  If you do not like this medication you can stop it and move your appt with me to a sooner date.      Hydroxyzine- As needed take 12.5mg to 50 mg (one half tab to 2 whole tabs) as needed for anxiety up to three times daily.

## 2021-02-01 NOTE — ASSESSMENT & PLAN NOTE
After she gave birth to her baby last January she has been feeeling dizzy and SOB.  Sometimes she feels anxious.  She has been having panic attacks.  She went to ER.  These occur about every 2 weeks, they last for about 20 minutes.  She has not taken medication for it.  She is not having trouble sleeping.

## 2021-02-01 NOTE — LETTER
[facility]  RAI Rothman.  [address] [address]  Fax: [phone]   Authorization for Release/Disclosure of   Protected Health Information   Name: RAIN PEREZ : 1995 SSN: [id number]   Address: [address] [place] [id number] Phone:    [phone] (home)    I authorize the entity listed below to release/disclose the PHI below to:   [facility]/KEO Rothman and KEO Rothman   Provider or Entity Name:  [facility]   Address   City, State, [facility]   Phone:      Fax:     Reason for request: continuity of care   Information to be released:    [  ] LAST COLONOSCOPY,  including any PATH REPORT and follow-up  [  ] LAST FIT/COLOGUARD RESULT [  ] LAST DEXA  [  ] LAST MAMMOGRAM  [  ] LAST PAP  [  ] LAST LABS [  ] RETINA EXAM REPORT  [  ] IMMUNIZATION RECORDS  [x  ] Release all info      [  ] Check here and initial the line next to each item to release ALL health information INCLUDING  _____ Care and treatment for drug and / or alcohol abuse  _____ HIV testing, infection status, or AIDS  _____ Genetic Testing    DATES OF SERVICE OR TIME PERIOD TO BE DISCLOSED: _____________  I understand and acknowledge that:  * This Authorization may be revoked at any time by you in writing, except if your health information has already been used or disclosed.  * Your health information that will be used or disclosed as a result of you signing this authorization could be re-disclosed by the recipient. If this occurs, your re-disclosed health information may no longer be protected by State or Federal laws.  * You may refuse to sign this Authorization. Your refusal will not affect your ability to obtain treatment.  * This Authorization becomes effective upon signing and will  on (date) __________.      If no date is indicated, this Authorization will  one (1) year from the signature date.    Name: Rain Perez    Signature:   Date:          2/1/2021       PLEASE FAX REQUESTED RECORDS BACK TO: [phone]

## 2021-02-01 NOTE — ASSESSMENT & PLAN NOTE
She has had thoracic back pain since last January.  She denies trauma.  She has gone to the ER for this issue twice.  She was given a medication that was helpful but made her dizzy- she cannot remember what it was.  She describes the pain as aching.  She has tried tylenol- this helps for a bit.

## 2021-02-01 NOTE — PROGRESS NOTES
Chief Complaint   Patient presents with   • Establish Care   • Anxiety   • Back Pain       Subjective:     HPI:   Rain Rosado is a 26 y.o. female here to establish care, back pain,  and to discuss the evaluation and management of:      Encounter to establish care  Prior PCP: Caitlin    Other Providers:      Anxiety  After she gave birth to her baby last January she has been feeeling dizzy and SOB.  Sometimes she feels anxious.  She has been having panic attacks.  She went to ER.  These occur about every 2 weeks, they last for about 20 minutes.  She has not taken medication for it.  She is not having trouble sleeping.      Thoracic back pain  She has had thoracic back pain since last January.  She denies trauma.  She has gone to the ER for this issue twice.  She was given a medication that was helpful but made her dizzy- she cannot remember what it was.  She describes the pain as aching.  She has tried tylenol- this helps for a bit.        ROS  Review of Systems   Constitutional: Negative for chills, fever, malaise/fatigue and weight loss.   Respiratory: Positive for shortness of breath. Negative for cough and wheezing.    Cardiovascular: Negative for chest pain, palpitations and leg swelling.   Gastrointestinal: Negative for abdominal pain, blood in stool, constipation and diarrhea.   Musculoskeletal: Positive for back pain.   Neurological: Positive for dizziness.         No Known Allergies    Current medicines (including changes today)  Current Outpatient Medications   Medication Sig Dispense Refill   • ibuprofen (MOTRIN) 800 MG Tab Take 1 Tab by mouth every 8 hours as needed for Moderate Pain. 90 Tab 5   • escitalopram (LEXAPRO) 10 MG Tab Take 1 Tab by mouth every bedtime. 30 Tab 2   • hydrOXYzine HCl (ATARAX) 25 MG Tab Take 1-2 Tabs by mouth 3 times a day as needed for Anxiety. 90 Tab 11   • cyclobenzaprine (FLEXERIL) 5 mg tablet Take 1-2 Tabs by mouth 3 times a day as needed for Moderate Pain. 90 Tab  "3     No current facility-administered medications for this visit.      She  has a past medical history of Pre-eclampsia in third trimester (2019).  She  has a past surgical history that includes repeat c section (2019).  Social History     Tobacco Use   • Smoking status: Never Smoker   • Smokeless tobacco: Never Used   Substance Use Topics   • Alcohol use: Never     Frequency: Never   • Drug use: Never       Family History   Problem Relation Age of Onset   • Diabetes Father    • Diabetes Paternal Aunt    • Diabetes Maternal Grandmother    • Heart Disease Maternal Grandfather      Family Status   Relation Name Status   • Mo  Alive   • Fa  Alive   • Bro 2 Alive       Patient Active Problem List    Diagnosis Date Noted   • S/P  section 2019     Priority: High   • Acute post-operative pain 2019     Priority: High   • Pre-eclampsia in third trimester 2019     Priority: High   • Encounter to establish care 2021   • Anxiety 2021   • Thoracic back pain 2021          Objective:     /70 (BP Location: Left arm, Patient Position: Sitting, BP Cuff Size: Adult)   Pulse 92   Temp 36.1 °C (96.9 °F) (Temporal)   Resp 16   Ht 1.727 m (5' 8\")   Wt 96.3 kg (212 lb 3.2 oz)   SpO2 99%  Body mass index is 32.26 kg/m².    Physical Exam:  Physical Exam   Constitutional: She is oriented to person, place, and time and well-developed, well-nourished, and in no distress. No distress.   HENT:   Head: Normocephalic.   Right Ear: Tympanic membrane and external ear normal.   Left Ear: Tympanic membrane and external ear normal.   Eyes: Pupils are equal, round, and reactive to light. Conjunctivae and EOM are normal.   Neck: Normal range of motion. Neck supple. No tracheal deviation present.   Cardiovascular: Normal rate, regular rhythm, normal heart sounds and intact distal pulses.   Pulmonary/Chest: Effort normal and breath sounds normal.   Abdominal: Soft. Bowel sounds are normal. "   Musculoskeletal:      Thoracic back: She exhibits decreased range of motion, pain and spasm. She exhibits no tenderness and no bony tenderness.   Lymphadenopathy:        Head (right side): No preauricular adenopathy present.        Head (left side): No preauricular adenopathy present.     She has no cervical adenopathy.   Neurological: She is alert and oriented to person, place, and time. She has normal sensation, normal strength and intact cranial nerves. Gait normal.   Skin: Skin is warm and dry.   Psychiatric: Affect and judgment normal.     I have placed the below orders and discussed them with an approved delegating provider.  The MA is performing the below orders under the direction of Dr. Mendoza.       Assessment and Plan:     The following treatment plan was discussed:    1. Anxiety  escitalopram (LEXAPRO) 10 MG Tab    hydrOXYzine HCl (ATARAX) 25 MG Tab  -Chronic problem, unstable.  Try Lexapro nightly and hydroxyzine as needed. Potential side effects and discontinuation criteria were discussed with patient, patient verbalized understanding.  ER precautions reviewed.   2. Chronic midline thoracic back pain  ibuprofen (MOTRIN) 800 MG Tab    cyclobenzaprine (FLEXERIL) 5 mg tablet    DX-THORACIC SPINE-2 VIEWS    REFERRAL TO PHYSICAL THERAPY  -Chronic problem, unstable.  We will get an x-ray of her thoracic spine.  I referred her to physical therapy.  We will try ibuprofen with food and Flexeril as needed for her pain and muscle spasms.   3. Encounter to establish care  -requesting records from OB/GYN to determine her health maintenance requirements.   4. Need for vaccination  Influenza Vaccine Quad Injection (PF)       Any change or worsening of signs or symptoms, patient encouraged to follow-up or report to emergency room for further evaluation. Patient verbalizes understanding and agrees.    Follow-Up: Return in about 6 weeks (around 3/15/2021) for Anxiety.      PLEASE NOTE: This dictation was created  using voice recognition software. I have made every reasonable attempt to correct obvious errors, but I expect that there are errors of grammar and possibly content that I did not discover before finalizing the note.

## 2021-02-02 ENCOUNTER — TELEPHONE (OUTPATIENT)
Dept: MEDICAL GROUP | Facility: MEDICAL CENTER | Age: 26
End: 2021-02-02

## 2021-02-03 NOTE — TELEPHONE ENCOUNTER
DOCUMENTATION OF PAR STATUS:    1. Name of Medication & Dose: CYCLOBENZAPRINE      2. Name of Prescription Coverage Company & phone #: MEDICAID     3. Date Prior Auth Submitted: 2/2/2021    4. What information was given to obtain insurance decision? [id number]    5. Prior Auth Status?   Pending    6. Patient Notified: N\A

## 2021-02-04 ENCOUNTER — TELEPHONE (OUTPATIENT)
Dept: MEDICAL GROUP | Facility: MEDICAL CENTER | Age: 26
End: 2021-02-04

## 2021-02-04 DIAGNOSIS — M54.6 CHRONIC MIDLINE THORACIC BACK PAIN: ICD-10-CM

## 2021-02-04 DIAGNOSIS — G89.29 CHRONIC MIDLINE THORACIC BACK PAIN: ICD-10-CM

## 2021-02-04 RX ORDER — CYCLOBENZAPRINE HCL 5 MG
5-10 TABLET ORAL 3 TIMES DAILY PRN
Qty: 90 TAB | Refills: 3 | Status: SHIPPED | OUTPATIENT
Start: 2021-02-04 | End: 2021-03-22 | Stop reason: SDUPTHER

## 2021-02-05 NOTE — TELEPHONE ENCOUNTER
FINAL PRIOR AUTHORIZATION STATUS:    1.  Name of Medication & Dose: Cyclobenzaprine 5 mg, 90 tablets per 15 days    2. Prior Auth Status: Denied.  Reason: Requests that exceed the supply limit will not be covered. Plan covers up to 90 tablets per 30 days.    3. Action Taken: Pharmacy Notified: yes Patient Notified: yes

## 2021-02-09 ENCOUNTER — TELEPHONE (OUTPATIENT)
Dept: MEDICAL GROUP | Facility: MEDICAL CENTER | Age: 26
End: 2021-02-09

## 2021-02-09 NOTE — TELEPHONE ENCOUNTER
MEDICATION PRIOR AUTHORIZATION NEEDED:    1. Name of Medication: Cyclobenzaprine hcl 5 mg tablets    2. Requested By (Name of Pharmacy): [facility]     3. Is insurance on file current? [employer] HMO/HPN    4. What is the name & phone number of the 3rd party payor? N/A

## 2021-02-10 NOTE — TELEPHONE ENCOUNTER
FINAL PRIOR AUTHORIZATION STATUS:    1.  Name of Medication & Dose: Cyclobenzprine 5 mg, 90 tablets per 30 days    2. Prior Auth Status: Approved through DOES NOT NEED PA     3. Action Taken: Pharmacy Notified: yes Patient Notified: yes

## 2021-03-06 ENCOUNTER — HOSPITAL ENCOUNTER (EMERGENCY)
Facility: MEDICAL CENTER | Age: 26
End: 2021-03-06
Attending: EMERGENCY MEDICINE | Admitting: EMERGENCY MEDICINE
Payer: MEDICAID

## 2021-03-06 ENCOUNTER — APPOINTMENT (OUTPATIENT)
Dept: RADIOLOGY | Facility: MEDICAL CENTER | Age: 26
End: 2021-03-06
Attending: EMERGENCY MEDICINE
Payer: MEDICAID

## 2021-03-06 VITALS
TEMPERATURE: 97.6 F | HEIGHT: 68 IN | DIASTOLIC BLOOD PRESSURE: 64 MMHG | WEIGHT: 215.39 LBS | OXYGEN SATURATION: 100 % | BODY MASS INDEX: 32.64 KG/M2 | HEART RATE: 82 BPM | RESPIRATION RATE: 18 BRPM | SYSTOLIC BLOOD PRESSURE: 109 MMHG

## 2021-03-06 DIAGNOSIS — T14.8XXA ABRASION: ICD-10-CM

## 2021-03-06 LAB
APPEARANCE UR: CLEAR
BILIRUB UR QL STRIP.AUTO: ABNORMAL
COLOR UR: YELLOW
GLUCOSE UR STRIP.AUTO-MCNC: NEGATIVE MG/DL
HCG UR QL: NEGATIVE
KETONES UR STRIP.AUTO-MCNC: ABNORMAL MG/DL
LEUKOCYTE ESTERASE UR QL STRIP.AUTO: NEGATIVE
MICRO URNS: ABNORMAL
NITRITE UR QL STRIP.AUTO: NEGATIVE
PH UR STRIP.AUTO: 6 [PH] (ref 5–8)
PROT UR QL STRIP: NEGATIVE MG/DL
RBC UR QL AUTO: NEGATIVE
SP GR UR STRIP.AUTO: >=1.03
UROBILINOGEN UR STRIP.AUTO-MCNC: 0.2 MG/DL

## 2021-03-06 PROCEDURE — 99283 EMERGENCY DEPT VISIT LOW MDM: CPT | Mod: EDC

## 2021-03-06 PROCEDURE — 73564 X-RAY EXAM KNEE 4 OR MORE: CPT | Mod: RT

## 2021-03-06 PROCEDURE — 81025 URINE PREGNANCY TEST: CPT

## 2021-03-06 PROCEDURE — 700101 HCHG RX REV CODE 250: Performed by: EMERGENCY MEDICINE

## 2021-03-06 PROCEDURE — 81003 URINALYSIS AUTO W/O SCOPE: CPT

## 2021-03-06 RX ORDER — LIDOCAINE HYDROCHLORIDE 20 MG/ML
JELLY TOPICAL ONCE
Status: COMPLETED | OUTPATIENT
Start: 2021-03-06 | End: 2021-03-06

## 2021-03-06 RX ADMIN — LIDOCAINE HYDROCHLORIDE 1 APPLICATION: 20 JELLY TOPICAL at 11:46

## 2021-03-06 ASSESSMENT — FIBROSIS 4 INDEX: FIB4 SCORE: 0.33

## 2021-03-06 NOTE — ED NOTES
Lidocaine jelly applied to wound.  Patient continues to drink water to attempt urine sample.  She denies needs and is aware of plan for xray.

## 2021-03-06 NOTE — ED NOTES
Patient able to walk to and from restroom with steady gate at this time. She is unable to provide urine sample at this tme.  Gave her x3 cups of water per erp okay

## 2021-03-06 NOTE — ED NOTES
Urine collected and sent to lab.  Patient verified correct patient name and  on labeled specimen.  She was informed of estimated lab result wait times, verbalized understanding.

## 2021-03-06 NOTE — ED NOTES
"Rain Rosado has been discharged from the Emergency Room.    Discharge instructions, which include signs and symptoms to monitor at home for, as well as detailed information regarding abrasion provided.  All questions and concerns addressed at this time.      Patient leaves ER in no apparent distress. This RN provided education regarding returning to the ER for any new concerns or changes in patient's condition.      /64   Pulse 82   Temp 36.4 °C (97.6 °F) (Temporal)   Resp 18   Ht 1.727 m (5' 8\")   Wt 97.7 kg (215 lb 6.2 oz)   SpO2 100%   BMI 32.75 kg/m²   "

## 2021-03-06 NOTE — ED PROVIDER NOTES
ED Provider Note    CHIEF COMPLAINT  Chief Complaint   Patient presents with    Knee Pain     ambulates triage c/o pain in right knee after tripped and fell. no deformity noted.     T-5000 Lacerations     below right knee. approximately 1.5 cm. arrived w/dressing in place. bleeding controlled.       HPI  Rain BOLTON Leena Rosado is a 26 y.o. female who presents to the emergency department with right knee injury.  Yesterday at around noon she tripped over a crack in the sidewalk falling on her right knee.  She sustained abrasion and a wound.  She washed the wound with alcohol.  She has had persistent pain in the knee.  Throbbing nonradiating.  No weakness numbness neurologic symptoms.  Denies any other injuries but reports that since yesterday after falling she has had some lower pelvic cramping and is wondering if she is pregnant.  She has pain over bilateral pelvic region.  Intermittent.  Denies dysuria, hematuria, frequency.  Denies abnormal vaginal discharge or bleeding.  Normal bowel movements.  No nausea vomiting diarrhea.  Last menstrual period about a month ago    REVIEW OF SYSTEMS  As per HPI, otherwise a 10 point review of systems is negative    PAST MEDICAL HISTORY  Past Medical History:   Diagnosis Date    Pre-eclampsia in third trimester 2019       SOCIAL HISTORY  Social History     Tobacco Use    Smoking status: Never Smoker    Smokeless tobacco: Never Used   Substance Use Topics    Alcohol use: Never    Drug use: Never       SURGICAL HISTORY  Past Surgical History:   Procedure Laterality Date    REPEAT C SECTION  2019    Procedure:  SECTION, REPEAT;  Surgeon: Corinne E Capurro, M.D.;  Location: LABOR AND DELIVERY;  Service: Labor and Delivery       CURRENT MEDICATIONS  Home Medications       Reviewed by Jenifer Fontana R.N. (Registered Nurse) on 21 at 1105  Med List Status: Partial     Medication Last Dose Status   cyclobenzaprine (FLEXERIL) 5 mg tablet  Active   escitalopram  "(LEXAPRO) 10 MG Tab  Active   hydrOXYzine HCl (ATARAX) 25 MG Tab  Active   ibuprofen (MOTRIN) 800 MG Tab  Active                    ALLERGIES  No Known Allergies    PHYSICAL EXAM  VITAL SIGNS: /86   Pulse 92   Temp 36.8 °C (98.2 °F) (Temporal)   Resp 15   Ht 1.727 m (5' 8\")   Wt 97.7 kg (215 lb 6.2 oz)   SpO2 98%   BMI 32.75 kg/m²    Constitutional: Awake and alert  HENT: Normal inspection  Eyes: Normal inspection  Neck: Grossly normal range of motion.  Cardiovascular: Normal heart rate  Thorax & Lungs: No respiratory distress  Abdomen: Bowel sounds normal, soft, non-distended, nontender  Skin: Abrasion over both knees.  There is about a 1 cm laceration within abrasion over the right knee.  Back: No tenderness, No CVA tenderness.   Extremities: Swelling and ecchymosis around abrasion of the right knee.  Mild pain with range of motion.  No deformity.  No bony tenderness of the left knee or other extremities  Neurologic: Normal sensory and motor  Psychiatric: Normal for situation    RADIOLOGY/PROCEDURES  DX-KNEE COMPLETE 4+ RIGHT   Final Result      Normal.           Imaging is interpreted by radiologist    Labs:  Results for orders placed or performed during the hospital encounter of 03/06/21   URINALYSIS CULTURE, IF INDICATED    Specimen: Urine   Result Value Ref Range    Color Yellow     Character Clear     Specific Gravity >=1.030 <1.035    Ph 6.0 5.0 - 8.0    Glucose Negative Negative mg/dL    Ketones Trace (A) Negative mg/dL    Protein Negative Negative mg/dL    Bilirubin Small (A) Negative    Urobilinogen, Urine 0.2 Negative    Nitrite Negative Negative    Leukocyte Esterase Negative Negative    Occult Blood Negative Negative    Micro Urine Req see below    HCG QUALITATIVE UR (Lab)   Result Value Ref Range    Beta-Hcg Urine Negative Negative       Medications   lidocaine 2 % jelly (has no administration in time range)         COURSE & MEDICAL DECISION MAKING  She presents with abrasion and subacute " laceration to the right knee.  X-rays obtained negative for fracture.  Her tetanus is current.  Wound was washed.  I will not close the wound because of high potential for infection.  We will close by secondary intent.  Patient was concerned that she might be pregnant because she had a pelvic cramps.  She has no tenderness on exam.  No other red flags.  She is not pregnant and does not have a UTI.  Does not want a pelvic exam.  Defer to primary provider.  Precaution patient to return to the ER for fevers, signs of wound infection, increasing abdominal pain, not improving or concern.    FINAL IMPRESSION  1.  Right knee abrasion and subacute laceration  2.  Right knee contusion  3.  Pelvic cramping, pregnancy ruled out      This dictation was created using voice recognition software. The accuracy of the dictation is limited to the abilities of the software.  The nursing notes were reviewed and certain aspects of this information were incorporated into this note.      Electronically signed by: Uriel Monge M.D., 3/6/2021 11:39 AM

## 2021-03-06 NOTE — ED NOTES
"First interaction with patient.  Assumed care at this time.  Patient presents to the ER today for complaint of a laceration to her right knee after suffering a ground level fall last night.  She states that she had her baby in one arm and the diaper bag in the other, and she didn't see the curb and tripped over it.  She now has a laceration to her right knee, bleeding controlled.  She is also concerned that since the fall, she has been having \"cervix cramps.\"  Her last menstrual period was approx 1 month ago, 2/8/2021.    Gown provided, patient instructed to changed prior to ERP evaluation.  NPO status explained by this RN.  Call light provided.  Chart up for ERP.    This RN provided education to patient about the importance of keeping mask in place over both mouth and nose for entire duration of ER visit.  "

## 2021-03-10 ENCOUNTER — APPOINTMENT (OUTPATIENT)
Dept: PHYSICAL THERAPY | Facility: REHABILITATION | Age: 26
End: 2021-03-10
Payer: MEDICAID

## 2021-03-15 ENCOUNTER — HOSPITAL ENCOUNTER (EMERGENCY)
Facility: MEDICAL CENTER | Age: 26
End: 2021-03-15
Attending: EMERGENCY MEDICINE
Payer: MEDICAID

## 2021-03-15 VITALS
HEART RATE: 74 BPM | RESPIRATION RATE: 15 BRPM | OXYGEN SATURATION: 96 % | TEMPERATURE: 98 F | WEIGHT: 217.15 LBS | DIASTOLIC BLOOD PRESSURE: 59 MMHG | HEIGHT: 68 IN | BODY MASS INDEX: 32.91 KG/M2 | SYSTOLIC BLOOD PRESSURE: 119 MMHG

## 2021-03-15 DIAGNOSIS — Z51.89 ENCOUNTER FOR WOUND RE-CHECK: ICD-10-CM

## 2021-03-15 PROCEDURE — 99282 EMERGENCY DEPT VISIT SF MDM: CPT

## 2021-03-15 ASSESSMENT — LIFESTYLE VARIABLES: DO YOU DRINK ALCOHOL: NO

## 2021-03-15 ASSESSMENT — FIBROSIS 4 INDEX: FIB4 SCORE: 0.33

## 2021-03-16 NOTE — ED NOTES
Patient from [facility] to [facility] 61 ambulatory with steady gait accompanied by ED Tech.    Healing scab noted to R knee. No odor/drainage noted. No periwound erythema/edema noted. RLE CMS intact. Patient denies pain to site.    Chart up for ERP.

## 2021-03-16 NOTE — ED TRIAGE NOTES
"Chief Complaint   Patient presents with   • Wound Check     Patient ambulatory to triage for a wound check to her Right knee. Patient states that she fell over a weeks ago onto [place] concrete. Patient has scabbed wound that has been rubbing against her pants causing increased pain and \"yellow-smelly stuff\" coming out of it. /90   Pulse 80   Temp 37.2 °C (99 °F) (Temporal)   Resp 14   Ht 1.727 m (5' 8\")   Wt 98.5 kg (217 lb 2.5 oz)   LMP 03/08/2021   SpO2 98%   BMI 33.02 kg/m²     "

## 2021-03-16 NOTE — ED NOTES
R knee wound cleansed with Sterile Water and Betadine. Bacitracin ointment and non-adherent dressing applied. Dry gauze placed and wrapped with roll gauze and Cobhan. Patient tolerated well with no complaints of pain/discomfort.

## 2021-03-16 NOTE — ED PROVIDER NOTES
ED Provider Note    Scribed for Ashley Millan M.D. by Lesly Van. 3/15/2021, 8:47 PM.    This patient was cared for during the COVID-19 pandemic. History and physical exam may be limited/truncated by the inherent challenges of PPE and the need to decrease staff exposure to novel coronavirus. Some aspects of disease management may be different to protect staff and help slow the spread of disease. I verified that, if possible, the patient was wearing a mask and I was wearing appropriate PPE every time I encountered the patient.     Primary Care Provider: KEO Rothman  Means of arrival: Walk in  History obtained from: Patient   History limited by: None     CHIEF COMPLAINT  Chief Complaint   Patient presents with   • Wound Check       HPI  Rain Rosado is a 26 y.o. female who presents to the Emergency Department for a wound check. Patient states she fell one week ago and now she has a wound to her left leg just below the knee. Patient states it is less swollen than it was originally and her knee is less swollen. Patient expressed concern for yellow drainage when she has clothing on top of it. She denies any fever, bleeding, warmth to the area, red streaks. Patient has been using aloe gel on the wound.     REVIEW OF SYSTEMS  Pertinent positives include wound check, yellow drainage. Pertinent negatives include no fever, bleeding, warmth to the area, red streaks.     PAST MEDICAL HISTORY   has a past medical history of Anxiety, Pre-eclampsia in third trimester (12/28/2019), and Psychiatric disorder.    SOCIAL HISTORY  Social History     Tobacco Use   • Smoking status: Never Smoker   • Smokeless tobacco: Never Used   Substance Use Topics   • Alcohol use: Never   • Drug use: Never      Social History     Substance and Sexual Activity   Drug Use Never       SURGICAL HISTORY   has a past surgical history that includes repeat c section (12/26/2019).     CURRENT MEDICATIONS  Home Medications   "   Reviewed by Mana Sánchez R.N. (Registered Nurse) on 03/15/21 at 1952  Med List Status: Partial   Medication Last Dose Status   cyclobenzaprine (FLEXERIL) 5 mg tablet  Active   escitalopram (LEXAPRO) 10 MG Tab  Active   hydrOXYzine HCl (ATARAX) 25 MG Tab  Active   ibuprofen (MOTRIN) 800 MG Tab  Active                ALLERGIES  No Known Allergies    PHYSICAL EXAM  VITAL SIGNS: /77   Pulse 94   Temp 37.2 °C (99 °F) (Temporal)   Resp 14   Ht 1.727 m (5' 8\")   Wt 98.5 kg (217 lb 2.5 oz)   LMP 03/08/2021   SpO2 99%   BMI 33.02 kg/m²   Constitutional: Alert in no apparent distress. Well apearing  HENT: Normocephalic, Atraumatic, Bilateral external ears normal. Nose normal.   Eyes:  Conjunctiva normal, non-icteric.   Lungs: Non-labored respirations  Skin: Warm, Dry, No erythema, No rash.   Neurologic: Alert, Grossly non-focal.   Psychiatric: Affect normal, Judgment normal, Mood normal, Appears appropriate and not intoxicated.   Extremities: Right knee 3-4 cm healing wound with thick scab around, granulation tissue filling in, no surrounding erythema.     COURSE & MEDICAL DECISION MAKING  Pertinent Labs & Imaging studies reviewed. (See chart for details)    8:47 PM - Patient seen and examined at bedside. Informed patient that I do not believe the wound is infected. I explained that her clothing is likely causing irritation which causes the drainage. I gave her wound care instructions and let her know we will provide dressings for her to use. I let her know no tests vanegas medications are needed at this time. Discussed plan for discharge; I advised the patient to follow-up with primary care, and to return to the [facility] ED with any new or worsening symptoms, including fever, redness. Patient was given the opportunity for questions. I addressed all questions or concerns at this time and they verbalize agreement to the plan of care. Review of vital signs at this visit show: /77   Pulse 91   Temp 37.2 °C " "(99 °F) (Temporal)   Resp 14   Ht 1.727 m (5' 8\")   Wt 98.5 kg (217 lb 2.5 oz)   LMP 03/08/2021   SpO2 95%   BMI 33.02 kg/m²         The patient will return for new or worsening symptoms and is stable at the time of discharge. Patient was given return precautions. Patient and/or family member verbalizes understanding and will comply.    DISPOSITION:  Patient will be discharged home in stable condition.    FOLLOW UP:  Glendy Campbell A.P.R.N.  [address]  [address]  [facility] [id number] [phone]      As needed    [facility], Emergency Dept  [address] [facility] [address] [phone]    Return for worsening pain redness fevers or other concerns.      FINAL IMPRESSION  1. Encounter for wound re-check         This dictation has been created using voice recognition software and/or scribes. The accuracy of the dictation is limited by the abilities of the software and the expertise of the scribes. I expect there may be some errors of grammar and possibly content. I made every attempt to manually correct the errors within my dictation. However, errors related to voice recognition software and/or scribes may still exist and should be interpreted within the appropriate context.     I, Lesly Van (Scribe), am scribing for, and in the presence of, Ashley Millan M.D..    Electronically signed by: Lesly Van (Adamarisibyohannes), 3/15/2021    IAshley M.D. personally performed the services described in this documentation, as scribed by Lesly Van in my presence, and it is both accurate and complete.    E    The note accurately reflects work and decisions made by me.  Ashley Millan M.D.  3/15/2021  9:45 PM  "

## 2021-03-17 ENCOUNTER — APPOINTMENT (OUTPATIENT)
Dept: PHYSICAL THERAPY | Facility: REHABILITATION | Age: 26
End: 2021-03-17
Payer: MEDICAID

## 2021-03-22 ENCOUNTER — OFFICE VISIT (OUTPATIENT)
Dept: MEDICAL GROUP | Facility: MEDICAL CENTER | Age: 26
End: 2021-03-22
Attending: NURSE PRACTITIONER
Payer: MEDICAID

## 2021-03-22 ENCOUNTER — HOSPITAL ENCOUNTER (OUTPATIENT)
Facility: MEDICAL CENTER | Age: 26
End: 2021-03-22
Attending: NURSE PRACTITIONER
Payer: MEDICAID

## 2021-03-22 ENCOUNTER — PHARMACY VISIT (OUTPATIENT)
Dept: PHARMACY | Facility: MEDICAL CENTER | Age: 26
End: 2021-03-22
Payer: COMMERCIAL

## 2021-03-22 VITALS
WEIGHT: 210.4 LBS | TEMPERATURE: 98.2 F | HEART RATE: 89 BPM | RESPIRATION RATE: 16 BRPM | OXYGEN SATURATION: 99 % | BODY MASS INDEX: 31.89 KG/M2 | SYSTOLIC BLOOD PRESSURE: 98 MMHG | HEIGHT: 68 IN | DIASTOLIC BLOOD PRESSURE: 66 MMHG

## 2021-03-22 DIAGNOSIS — M54.6 CHRONIC MIDLINE THORACIC BACK PAIN: ICD-10-CM

## 2021-03-22 DIAGNOSIS — G89.29 CHRONIC MIDLINE THORACIC BACK PAIN: ICD-10-CM

## 2021-03-22 DIAGNOSIS — Z20.2 CHLAMYDIA CONTACT: ICD-10-CM

## 2021-03-22 PROCEDURE — 700111 HCHG RX REV CODE 636 W/ 250 OVERRIDE (IP): Performed by: NURSE PRACTITIONER

## 2021-03-22 PROCEDURE — 99213 OFFICE O/P EST LOW 20 MIN: CPT | Performed by: NURSE PRACTITIONER

## 2021-03-22 PROCEDURE — 700101 HCHG RX REV CODE 250: Performed by: NURSE PRACTITIONER

## 2021-03-22 PROCEDURE — RXMED WILLOW AMBULATORY MEDICATION CHARGE: Performed by: NURSE PRACTITIONER

## 2021-03-22 PROCEDURE — 99214 OFFICE O/P EST MOD 30 MIN: CPT | Performed by: NURSE PRACTITIONER

## 2021-03-22 RX ORDER — CYCLOBENZAPRINE HCL 5 MG
5-10 TABLET ORAL 3 TIMES DAILY PRN
Qty: 90 TABLET | Refills: 3 | Status: SHIPPED | OUTPATIENT
Start: 2021-03-22 | End: 2022-08-30 | Stop reason: SDUPTHER

## 2021-03-22 RX ORDER — AZITHROMYCIN 500 MG/1
1000 TABLET, FILM COATED ORAL DAILY
Qty: 2 TABLET | Refills: 0 | Status: SHIPPED | OUTPATIENT
Start: 2021-03-22 | End: 2022-06-06

## 2021-03-22 RX ADMIN — LIDOCAINE HYDROCHLORIDE 500 MG: 10 INJECTION, SOLUTION EPIDURAL; INFILTRATION; INTRACAUDAL; PERINEURAL at 15:14

## 2021-03-22 ASSESSMENT — ENCOUNTER SYMPTOMS
ABDOMINAL PAIN: 0
FEVER: 0
WHEEZING: 0
COUGH: 0
BLOOD IN STOOL: 0
WEIGHT LOSS: 0
PALPITATIONS: 0
CHILLS: 0
SHORTNESS OF BREATH: 0
DIARRHEA: 0
CONSTIPATION: 0

## 2021-03-22 ASSESSMENT — FIBROSIS 4 INDEX: FIB4 SCORE: 0.33

## 2021-03-22 NOTE — ASSESSMENT & PLAN NOTE
Her SO told her yesterday that he tested positive for chlamydia.  They were last intimate last week.  She denies new pelvic pain, discharge, and odor.

## 2021-03-22 NOTE — PROGRESS NOTES
No chief complaint on file.      Subjective:     HPI:   Rain Rosado is a 26 y.o. female here to discuss the evaluation and management of:        Chlamydia contact  Her SO told her yesterday that he tested positive for chlamydia.  They were last intimate last week.  She denies new pelvic pain, discharge, and odor.        ROS  Review of Systems   Constitutional: Negative for chills, fever, malaise/fatigue and weight loss.   Respiratory: Negative for cough, shortness of breath and wheezing.    Cardiovascular: Negative for chest pain, palpitations and leg swelling.   Gastrointestinal: Negative for abdominal pain, blood in stool, constipation and diarrhea.         No Known Allergies    Current medicines (including changes today)  Current Outpatient Medications   Medication Sig Dispense Refill   • azithromycin (ZITHROMAX) 500 MG tablet Take 2 Tablets by mouth every day. 2 tablet 0   • cyclobenzaprine (FLEXERIL) 5 mg tablet Take 1-2 Tablets by mouth 3 times a day as needed for Moderate Pain. 90 tablet 3   • ibuprofen (MOTRIN) 800 MG Tab Take 1 Tab by mouth every 8 hours as needed for Moderate Pain. 90 Tab 5   • escitalopram (LEXAPRO) 10 MG Tab Take 1 Tab by mouth every bedtime. 30 Tab 2   • hydrOXYzine HCl (ATARAX) 25 MG Tab Take 1-2 Tabs by mouth 3 times a day as needed for Anxiety. 90 Tab 11     Current Facility-Administered Medications   Medication Dose Route Frequency Provider Last Rate Last Admin   • cefTRIAXone (ROCEPHIN) 500 mg, lidocaine (XYLOCAINE) 1 % 1.8 mL for IM use  500 mg Intramuscular Once Glendy Campbell, A.P.R.N.           Social History     Tobacco Use   • Smoking status: Never Smoker   • Smokeless tobacco: Never Used   Substance Use Topics   • Alcohol use: Never   • Drug use: Never       Patient Active Problem List    Diagnosis Date Noted   • S/P  section 2019   • Acute post-operative pain 2019   • Pre-eclampsia in third trimester 2019   • Chlamydia contact  03/22/2021   • Encounter to establish care 02/01/2021   • Anxiety 02/01/2021   • Thoracic back pain 02/01/2021       Family History   Problem Relation Age of Onset   • Diabetes Father    • Diabetes Paternal Aunt    • Diabetes Maternal Grandmother    • Heart Disease Maternal Grandfather           Objective:     There were no vitals taken for this visit. There is no height or weight on file to calculate BMI.    Physical Exam:  Physical Exam   Constitutional: She is oriented to person, place, and time and well-developed, well-nourished, and in no distress. No distress.   HENT:   Head: Normocephalic.   Right Ear: Tympanic membrane and external ear normal.   Left Ear: Tympanic membrane and external ear normal.   Eyes: Pupils are equal, round, and reactive to light. Conjunctivae and EOM are normal.   Neck: No tracheal deviation present.   Cardiovascular: Normal rate, regular rhythm, normal heart sounds and intact distal pulses.   Pulmonary/Chest: Effort normal and breath sounds normal.   Abdominal: Soft. Bowel sounds are normal.   Musculoskeletal:         General: Normal range of motion.      Cervical back: Normal range of motion and neck supple.   Lymphadenopathy:        Head (right side): No preauricular adenopathy present.        Head (left side): No preauricular adenopathy present.     She has no cervical adenopathy.   Neurological: She is alert and oriented to person, place, and time. She has normal sensation, normal strength and intact cranial nerves. Gait normal.   Skin: Skin is warm and dry.   Psychiatric: Affect and judgment normal.     I have placed the below orders and discussed them with an approved delegating provider.  The MA is performing the below orders under the direction of Dr. Mendoza.    Assessment and Plan:     The following treatment plan was discussed:    1. Chlamydia contact  azithromycin (ZITHROMAX) 500 MG tablet    cefTRIAXone (ROCEPHIN) 500 mg, lidocaine (XYLOCAINE) 1 % 1.8 mL for IM use     HIV AG/AB COMBO ASSAY SCREENING    Chlamydia/GC PCR Urine Or Swab    RPR  - New problem, unstable.  We will treat for gonorrhea and chlamydia.  We discussed the importance of taking the azithromycin orally, patient verbalized understanding.  Patient will reach out if she has any trouble getting the oral medication.  Advised patient to avoid all sexual contact for 7 days.  We will repeat testing in 1 month.   2. Chronic midline thoracic back pain  cyclobenzaprine (FLEXERIL) 5 mg tablet  -Chronic problem, unstable.  Patient was unable to get cyclobenzaprine from [facility], sent to another pharmacy.       Any change or worsening of signs or symptoms, patient encouraged to follow-up or report to emergency room for further evaluation. Patient verbalizes understanding and agrees.    Follow-Up: Return in about 4 weeks (around 4/19/2021) for Routine follow up- test of cure.      PLEASE NOTE: This dictation was created using voice recognition software. I have made every reasonable attempt to correct obvious errors, but I expect that there are errors of grammar and possibly content that I did not discover before finalizing the note.

## 2021-03-23 LAB
FORWARD REASON: SPWHY: NORMAL
FORWARDED TO LAB: SPWHR: NORMAL
SPECIMEN SENT: SPWT1: NORMAL

## 2021-03-25 NOTE — RESULT ENCOUNTER NOTE
Anuj Rodrigez-I got some of your lab results.  You are negative for gonorrhea and chlamydia.  Please let me know if you have any questions or concerns.Glendy

## 2021-03-31 ENCOUNTER — APPOINTMENT (OUTPATIENT)
Dept: PHYSICAL THERAPY | Facility: REHABILITATION | Age: 26
End: 2021-03-31
Payer: MEDICAID

## 2022-06-02 PROCEDURE — 99284 EMERGENCY DEPT VISIT MOD MDM: CPT

## 2022-06-02 PROCEDURE — 36415 COLL VENOUS BLD VENIPUNCTURE: CPT

## 2022-06-03 ENCOUNTER — APPOINTMENT (OUTPATIENT)
Dept: RADIOLOGY | Facility: MEDICAL CENTER | Age: 27
End: 2022-06-03
Attending: STUDENT IN AN ORGANIZED HEALTH CARE EDUCATION/TRAINING PROGRAM
Payer: COMMERCIAL

## 2022-06-03 ENCOUNTER — HOSPITAL ENCOUNTER (EMERGENCY)
Facility: MEDICAL CENTER | Age: 27
End: 2022-06-03
Attending: STUDENT IN AN ORGANIZED HEALTH CARE EDUCATION/TRAINING PROGRAM
Payer: COMMERCIAL

## 2022-06-03 VITALS
SYSTOLIC BLOOD PRESSURE: 122 MMHG | HEIGHT: 68 IN | OXYGEN SATURATION: 94 % | HEART RATE: 76 BPM | DIASTOLIC BLOOD PRESSURE: 76 MMHG | BODY MASS INDEX: 32.44 KG/M2 | TEMPERATURE: 97.6 F | WEIGHT: 214.07 LBS | RESPIRATION RATE: 16 BRPM

## 2022-06-03 DIAGNOSIS — R10.10 PAIN OF UPPER ABDOMEN: ICD-10-CM

## 2022-06-03 LAB
ALBUMIN SERPL BCP-MCNC: 3.9 G/DL (ref 3.2–4.9)
ALBUMIN/GLOB SERPL: 1.1 G/DL
ALP SERPL-CCNC: 73 U/L (ref 30–99)
ALT SERPL-CCNC: 11 U/L (ref 2–50)
ANION GAP SERPL CALC-SCNC: 12 MMOL/L (ref 7–16)
APPEARANCE UR: ABNORMAL
AST SERPL-CCNC: 18 U/L (ref 12–45)
BACTERIA #/AREA URNS HPF: NEGATIVE /HPF
BASOPHILS # BLD AUTO: 0.2 % (ref 0–1.8)
BASOPHILS # BLD: 0.01 K/UL (ref 0–0.12)
BILIRUB SERPL-MCNC: 0.2 MG/DL (ref 0.1–1.5)
BILIRUB UR QL STRIP.AUTO: NEGATIVE
BUN SERPL-MCNC: 18 MG/DL (ref 8–22)
CALCIUM SERPL-MCNC: 8.7 MG/DL (ref 8.5–10.5)
CAOX CRY #/AREA URNS HPF: NORMAL /HPF
CHLORIDE SERPL-SCNC: 106 MMOL/L (ref 96–112)
CO2 SERPL-SCNC: 21 MMOL/L (ref 20–33)
COLOR UR: YELLOW
CREAT SERPL-MCNC: 0.81 MG/DL (ref 0.5–1.4)
EOSINOPHIL # BLD AUTO: 0.05 K/UL (ref 0–0.51)
EOSINOPHIL NFR BLD: 0.9 % (ref 0–6.9)
EPI CELLS #/AREA URNS HPF: NORMAL /HPF
ERYTHROCYTE [DISTWIDTH] IN BLOOD BY AUTOMATED COUNT: 40.9 FL (ref 35.9–50)
GFR SERPLBLD CREATININE-BSD FMLA CKD-EPI: 102 ML/MIN/1.73 M 2
GLOBULIN SER CALC-MCNC: 3.4 G/DL (ref 1.9–3.5)
GLUCOSE SERPL-MCNC: 105 MG/DL (ref 65–99)
GLUCOSE UR STRIP.AUTO-MCNC: NEGATIVE MG/DL
HCG SERPL QL: NEGATIVE
HCT VFR BLD AUTO: 36.9 % (ref 37–47)
HGB BLD-MCNC: 11.9 G/DL (ref 12–16)
HYALINE CASTS #/AREA URNS LPF: NORMAL /LPF
IMM GRANULOCYTES # BLD AUTO: 0.03 K/UL (ref 0–0.11)
IMM GRANULOCYTES NFR BLD AUTO: 0.5 % (ref 0–0.9)
KETONES UR STRIP.AUTO-MCNC: ABNORMAL MG/DL
LEUKOCYTE ESTERASE UR QL STRIP.AUTO: NEGATIVE
LIPASE SERPL-CCNC: 32 U/L (ref 11–82)
LYMPHOCYTES # BLD AUTO: 1.66 K/UL (ref 1–4.8)
LYMPHOCYTES NFR BLD: 28.9 % (ref 22–41)
MCH RBC QN AUTO: 26.2 PG (ref 27–33)
MCHC RBC AUTO-ENTMCNC: 32.2 G/DL (ref 33.6–35)
MCV RBC AUTO: 81.3 FL (ref 81.4–97.8)
MICRO URNS: ABNORMAL
MONOCYTES # BLD AUTO: 0.29 K/UL (ref 0–0.85)
MONOCYTES NFR BLD AUTO: 5.1 % (ref 0–13.4)
NEUTROPHILS # BLD AUTO: 3.7 K/UL (ref 2–7.15)
NEUTROPHILS NFR BLD: 64.4 % (ref 44–72)
NITRITE UR QL STRIP.AUTO: NEGATIVE
NRBC # BLD AUTO: 0 K/UL
NRBC BLD-RTO: 0 /100 WBC
PH UR STRIP.AUTO: 6 [PH] (ref 5–8)
PLATELET # BLD AUTO: 295 K/UL (ref 164–446)
PMV BLD AUTO: 11 FL (ref 9–12.9)
POTASSIUM SERPL-SCNC: 3.5 MMOL/L (ref 3.6–5.5)
PROT SERPL-MCNC: 7.3 G/DL (ref 6–8.2)
PROT UR QL STRIP: NEGATIVE MG/DL
RBC # BLD AUTO: 4.54 M/UL (ref 4.2–5.4)
RBC # URNS HPF: NORMAL /HPF
RBC UR QL AUTO: NEGATIVE
SODIUM SERPL-SCNC: 139 MMOL/L (ref 135–145)
SP GR UR STRIP.AUTO: >=1.045
UROBILINOGEN UR STRIP.AUTO-MCNC: 1 MG/DL
WBC # BLD AUTO: 5.7 K/UL (ref 4.8–10.8)
WBC #/AREA URNS HPF: NORMAL /HPF

## 2022-06-03 PROCEDURE — 83690 ASSAY OF LIPASE: CPT

## 2022-06-03 PROCEDURE — 85025 COMPLETE CBC W/AUTO DIFF WBC: CPT

## 2022-06-03 PROCEDURE — 700102 HCHG RX REV CODE 250 W/ 637 OVERRIDE(OP): Performed by: STUDENT IN AN ORGANIZED HEALTH CARE EDUCATION/TRAINING PROGRAM

## 2022-06-03 PROCEDURE — 80053 COMPREHEN METABOLIC PANEL: CPT

## 2022-06-03 PROCEDURE — 81001 URINALYSIS AUTO W/SCOPE: CPT

## 2022-06-03 PROCEDURE — A9270 NON-COVERED ITEM OR SERVICE: HCPCS | Performed by: STUDENT IN AN ORGANIZED HEALTH CARE EDUCATION/TRAINING PROGRAM

## 2022-06-03 PROCEDURE — 84703 CHORIONIC GONADOTROPIN ASSAY: CPT

## 2022-06-03 PROCEDURE — 76705 ECHO EXAM OF ABDOMEN: CPT

## 2022-06-03 RX ORDER — ACETAMINOPHEN 325 MG/1
650 TABLET ORAL ONCE
Status: COMPLETED | OUTPATIENT
Start: 2022-06-03 | End: 2022-06-03

## 2022-06-03 RX ADMIN — ACETAMINOPHEN 650 MG: 325 TABLET ORAL at 03:03

## 2022-06-03 RX ADMIN — LIDOCAINE HYDROCHLORIDE 30 ML: 20 SOLUTION OROPHARYNGEAL at 03:24

## 2022-06-03 ASSESSMENT — FIBROSIS 4 INDEX: FIB4 SCORE: 0.34

## 2022-06-03 NOTE — ED NOTES
Patient ambulatory to [facility]   for triage complaint with SO at bedside. Pt placed in gown and placed on monitor.

## 2022-06-03 NOTE — ED PROVIDER NOTES
ED Provider Note    CHIEF COMPLAINT  Chief Complaint   Patient presents with   • Abdominal Pain     Pt reports mid upper abd pain since this AM with some nausea, 1 episode of vomiting, denies blood in emesis, diarrhea or constipation       HPI  Rain Rosado is a 27 y.o. female who presents with abdominal pain that started Thursday morning and has persisted.  Patient states the pain is mainly located in her mid upper abdomen and has had some nausea, 1 episode of nonbilious emesis earlier today.  She also states she has some cramping in the rest of her abdomen as well.  Denies fever, diarrhea.  Does report mild dysuria.  Her menstrual period ended 2 days ago.  Denies any usual vaginal discharge or history of STDs.  Denies abdominal surgeries in the past.  Denies any other recent illnesses.  She states she had worsening symptoms earlier today after eating some hot Cheetos.  She also reports the pain that started prior to eating hot Cheetos.  She states she was at work when the pain was preventing her from working which is why she came here.    REVIEW OF SYSTEMS  See HPI for further details. All other systems are negative.     PAST MEDICAL HISTORY   has a past medical history of Anxiety, Pre-eclampsia in third trimester (12/28/2019), and Psychiatric disorder.    SOCIAL HISTORY  Social History     Tobacco Use   • Smoking status: Never Smoker   • Smokeless tobacco: Never Used   Vaping Use   • Vaping Use: Never used   Substance and Sexual Activity   • Alcohol use: Never   • Drug use: Never   • Sexual activity: Not Currently     Partners: Male     Birth control/protection: Condom       SURGICAL HISTORY   has a past surgical history that includes repeat c section (12/26/2019).    CURRENT MEDICATIONS  Home Medications     Reviewed by Kezia Pappas R.N. (Registered Nurse) on 06/03/22 at 0008  Med List Status: Not Addressed   Medication Last Dose Status   azithromycin (ZITHROMAX) 500 MG tablet  Active  "  cyclobenzaprine (FLEXERIL) 5 mg tablet  Active   escitalopram (LEXAPRO) 10 MG Tab  Active   hydrOXYzine HCl (ATARAX) 25 MG Tab  Active   ibuprofen (MOTRIN) 800 MG Tab  Active                ALLERGIES  No Known Allergies    PHYSICAL EXAM  VITAL SIGNS: /76   Pulse 76   Temp 36.4 °C (97.6 °F)   Resp 16   Ht 1.727 m (5' 8\")   Wt 97.1 kg (214 lb 1.1 oz)   LMP 05/17/2022   SpO2 94%   BMI 32.55 kg/m²     Pulse ox interpretation: I interpret this pulse ox as normal.  Constitutional: Alert in no apparent distress.  Obese female appears stated age  HENT: No signs of trauma, Bilateral external ears normal, Nose normal.   Eyes: Pupils are equal and reactive, Conjunctiva normal, Non-icteric.   Neck: Normal range of motion, No tenderness, Supple, No stridor.   Cardiovascular: Regular rate and rhythm, no murmurs.   Thorax & Lungs: Normal breath sounds, No respiratory distress, No wheezing.   Abdomen: Soft, RUQ tenderness, No masses, No pulsatile masses. No peritoneal signs. Neg [place]  Skin: Warm, Dry, No erythema, No rash.   Extremities: Intact distal pulses, No edema, No tenderness, No cyanosis.  Musculoskeletal: Good range of motion in all major joints. No major deformities noted.   Neurologic: Alert , Normal motor function, Normal speech, No focal deficits noted.   Psychiatric: Affect slightly shy vs avoidant, Judgment normal, Mood normal.       DIAGNOSTIC STUDIES / PROCEDURES    LABS  Results for orders placed or performed during the hospital encounter of 06/03/22   CBC WITH DIFFERENTIAL   Result Value Ref Range    WBC 5.7 4.8 - 10.8 K/uL    RBC 4.54 4.20 - 5.40 M/uL    Hemoglobin 11.9 (L) 12.0 - 16.0 g/dL    Hematocrit 36.9 (L) 37.0 - 47.0 %    MCV 81.3 (L) 81.4 - 97.8 fL    MCH 26.2 (L) 27.0 - 33.0 pg    MCHC 32.2 (L) 33.6 - 35.0 g/dL    RDW 40.9 35.9 - 50.0 fL    Platelet Count 295 164 - 446 K/uL    MPV 11.0 9.0 - 12.9 fL    Neutrophils-Polys 64.40 44.00 - 72.00 %    Lymphocytes 28.90 22.00 - 41.00 %    " Monocytes 5.10 0.00 - 13.40 %    Eosinophils 0.90 0.00 - 6.90 %    Basophils 0.20 0.00 - 1.80 %    Immature Granulocytes 0.50 0.00 - 0.90 %    Nucleated RBC 0.00 /100 WBC    Neutrophils (Absolute) 3.70 2.00 - 7.15 K/uL    Lymphs (Absolute) 1.66 1.00 - 4.80 K/uL    Monos (Absolute) 0.29 0.00 - 0.85 K/uL    Eos (Absolute) 0.05 0.00 - 0.51 K/uL    Baso (Absolute) 0.01 0.00 - 0.12 K/uL    Immature Granulocytes (abs) 0.03 0.00 - 0.11 K/uL    NRBC (Absolute) 0.00 K/uL   COMP METABOLIC PANEL   Result Value Ref Range    Sodium 139 135 - 145 mmol/L    Potassium 3.5 (L) 3.6 - 5.5 mmol/L    Chloride 106 96 - 112 mmol/L    Co2 21 20 - 33 mmol/L    Anion Gap 12.0 7.0 - 16.0    Glucose 105 (H) 65 - 99 mg/dL    Bun 18 8 - 22 mg/dL    Creatinine 0.81 0.50 - 1.40 mg/dL    Calcium 8.7 8.5 - 10.5 mg/dL    AST(SGOT) 18 12 - 45 U/L    ALT(SGPT) 11 2 - 50 U/L    Alkaline Phosphatase 73 30 - 99 U/L    Total Bilirubin 0.2 0.1 - 1.5 mg/dL    Albumin 3.9 3.2 - 4.9 g/dL    Total Protein 7.3 6.0 - 8.2 g/dL    Globulin 3.4 1.9 - 3.5 g/dL    A-G Ratio 1.1 g/dL   LIPASE   Result Value Ref Range    Lipase 32 11 - 82 U/L   HCG QUAL SERUM   Result Value Ref Range    Beta-Hcg Qualitative Serum Negative Negative   URINALYSIS,CULTURE IF INDICATED    Specimen: Urine, Clean Catch; Blood   Result Value Ref Range    Color Yellow     Character Cloudy (A)     Specific Gravity >=1.045 (A) <1.035    Ph 6.0 5.0 - 8.0    Glucose Negative Negative mg/dL    Ketones Trace (A) Negative mg/dL    Protein Negative Negative mg/dL    Bilirubin Negative Negative    Urobilinogen, Urine 1.0 Negative    Nitrite Negative Negative    Leukocyte Esterase Negative Negative    Occult Blood Negative Negative    Micro Urine Req Microscopic    ESTIMATED GFR   Result Value Ref Range    GFR (CKD-EPI) 102 >60 mL/min/1.73 m 2   URINE MICROSCOPIC (W/UA)   Result Value Ref Range    WBC 0-2 /hpf    RBC 0-2 /hpf    Bacteria Negative None /hpf    Epithelial Cells Few /hpf    Ca Oxalate  Crystal Many /hpf    Hyaline Cast 0-2 /lpf       RADIOLOGY  US-RUQ   Final Result      Unremarkable RIGHT upper quadrant ultrasound            COURSE & MEDICAL DECISION MAKING  Pertinent Labs & Imaging studies reviewed. (See chart for details)    27-year-old obese but previously healthy female presenting with 1 day of generalized cramping abdominal pain.  She has normal vital signs.  She has been able to tolerate food and fluids during the day which is reassuring.  Crampy nature is most likely some GI upset/enteritis from unclear etiology.  She has no evidence of urinary tract infection.  She was tender in the right upper quadrant on exam, but ultrasound shows no evidence of gallstones or cholecystitis.  Her LFTs are normal as is her lipase.  No pancreatitis.  She has no leukocytosis, no tenderness in the right lower quadrant to suspect appendicitis.  She was able to tolerate p.o. fluids in ED.  Discharged home with return precautions.  Advised to continue a bland diet at home while her symptoms exist and return if symptoms worsen.      The patient will not drink alcohol nor drive with prescribed medications. The patient will return for worsening symptoms and is stable at the time of discharge. The patient verbalizes understanding and will comply.    FINAL IMPRESSION  1. Pain of upper abdomen           Electronically signed by: Gi Haji M.D., 6/3/2022 1:54 AM

## 2022-06-03 NOTE — ED TRIAGE NOTES
"Chief Complaint   Patient presents with   • Abdominal Pain     Pt reports mid upper abd pain since this AM with some nausea, 1 episode of vomiting, denies blood in emesis, diarrhea or constipation       Pt to triage with steady gait for above complaint.     Pt presents in triage with mid upper abd pain since this AM, pt states she has been taking ibuprofen and Tylenol today with minimal relief. Pt reports n/v this morning, denies blood in emesis, diarrhea or constipation.    Protocol ordered    Pt back to lobby, educated on triage process and encourage to alert staff of any changes.     /73   Pulse 89   Temp 36.3 °C (97.4 °F) (Temporal)   Resp 14   Ht 1.727 m (5' 8\")   Wt 97.1 kg (214 lb 1.1 oz)   LMP 05/17/2022   SpO2 98%   BMI 32.55 kg/m²     "

## 2022-06-03 NOTE — DISCHARGE INSTRUCTIONS
You most likely have some sort of mild irritation of your intestines either from something you ate or possibly a virus.  Stay hydrated and drink plenty of water.  Eat bland food and progress your diet slowly.  Return to the emergency department if you develop worsening abdominal pain, fevers, inability tolerate oral fluids, or other concerns.  Please follow-up with your primary care doctor for recheck in next 1 to 2 days.

## 2022-06-06 ENCOUNTER — APPOINTMENT (OUTPATIENT)
Dept: RADIOLOGY | Facility: MEDICAL CENTER | Age: 27
End: 2022-06-06
Attending: EMERGENCY MEDICINE
Payer: COMMERCIAL

## 2022-06-06 ENCOUNTER — HOSPITAL ENCOUNTER (EMERGENCY)
Facility: MEDICAL CENTER | Age: 27
End: 2022-06-06
Attending: EMERGENCY MEDICINE
Payer: COMMERCIAL

## 2022-06-06 ENCOUNTER — APPOINTMENT (OUTPATIENT)
Dept: RADIOLOGY | Facility: MEDICAL CENTER | Age: 27
End: 2022-06-06
Payer: COMMERCIAL

## 2022-06-06 VITALS
WEIGHT: 208.56 LBS | RESPIRATION RATE: 20 BRPM | DIASTOLIC BLOOD PRESSURE: 81 MMHG | TEMPERATURE: 97 F | SYSTOLIC BLOOD PRESSURE: 127 MMHG | HEIGHT: 68 IN | BODY MASS INDEX: 31.61 KG/M2 | OXYGEN SATURATION: 98 % | HEART RATE: 75 BPM

## 2022-06-06 DIAGNOSIS — A09 DIARRHEA OF INFECTIOUS ORIGIN: ICD-10-CM

## 2022-06-06 DIAGNOSIS — E87.6 HYPOKALEMIA: ICD-10-CM

## 2022-06-06 LAB
ABO GROUP BLD: NORMAL
ALBUMIN SERPL BCP-MCNC: 4.1 G/DL (ref 3.2–4.9)
ALBUMIN/GLOB SERPL: 1.2 G/DL
ALP SERPL-CCNC: 72 U/L (ref 30–99)
ALT SERPL-CCNC: 17 U/L (ref 2–50)
ANION GAP SERPL CALC-SCNC: 14 MMOL/L (ref 7–16)
APTT PPP: 28.9 SEC (ref 24.7–36)
AST SERPL-CCNC: 29 U/L (ref 12–45)
BASOPHILS # BLD AUTO: 0.6 % (ref 0–1.8)
BASOPHILS # BLD: 0.03 K/UL (ref 0–0.12)
BILIRUB SERPL-MCNC: <0.2 MG/DL (ref 0.1–1.5)
BLD GP AB SCN SERPL QL: NORMAL
BUN SERPL-MCNC: 13 MG/DL (ref 8–22)
CALCIUM SERPL-MCNC: 8.8 MG/DL (ref 8.5–10.5)
CHLORIDE SERPL-SCNC: 105 MMOL/L (ref 96–112)
CO2 SERPL-SCNC: 20 MMOL/L (ref 20–33)
CREAT SERPL-MCNC: 0.73 MG/DL (ref 0.5–1.4)
EOSINOPHIL # BLD AUTO: 0.1 K/UL (ref 0–0.51)
EOSINOPHIL NFR BLD: 2 % (ref 0–6.9)
ERYTHROCYTE [DISTWIDTH] IN BLOOD BY AUTOMATED COUNT: 39.3 FL (ref 35.9–50)
GFR SERPLBLD CREATININE-BSD FMLA CKD-EPI: 116 ML/MIN/1.73 M 2
GLOBULIN SER CALC-MCNC: 3.3 G/DL (ref 1.9–3.5)
GLUCOSE SERPL-MCNC: 104 MG/DL (ref 65–99)
HCT VFR BLD AUTO: 35.7 % (ref 37–47)
HGB BLD-MCNC: 11.7 G/DL (ref 12–16)
IMM GRANULOCYTES # BLD AUTO: 0 K/UL (ref 0–0.11)
IMM GRANULOCYTES NFR BLD AUTO: 0 % (ref 0–0.9)
INR PPP: 1.1 (ref 0.87–1.13)
LIPASE SERPL-CCNC: 28 U/L (ref 11–82)
LYMPHOCYTES # BLD AUTO: 2.12 K/UL (ref 1–4.8)
LYMPHOCYTES NFR BLD: 43.2 % (ref 22–41)
MCH RBC QN AUTO: 26 PG (ref 27–33)
MCHC RBC AUTO-ENTMCNC: 32.8 G/DL (ref 33.6–35)
MCV RBC AUTO: 79.3 FL (ref 81.4–97.8)
MONOCYTES # BLD AUTO: 0.4 K/UL (ref 0–0.85)
MONOCYTES NFR BLD AUTO: 8.1 % (ref 0–13.4)
NEUTROPHILS # BLD AUTO: 2.26 K/UL (ref 2–7.15)
NEUTROPHILS NFR BLD: 46.1 % (ref 44–72)
NRBC # BLD AUTO: 0 K/UL
NRBC BLD-RTO: 0 /100 WBC
PLATELET # BLD AUTO: 277 K/UL (ref 164–446)
PMV BLD AUTO: 10.5 FL (ref 9–12.9)
POTASSIUM SERPL-SCNC: 3 MMOL/L (ref 3.6–5.5)
PROT SERPL-MCNC: 7.4 G/DL (ref 6–8.2)
PROTHROMBIN TIME: 13.9 SEC (ref 12–14.6)
RBC # BLD AUTO: 4.5 M/UL (ref 4.2–5.4)
RH BLD: NORMAL
SODIUM SERPL-SCNC: 139 MMOL/L (ref 135–145)
WBC # BLD AUTO: 4.9 K/UL (ref 4.8–10.8)

## 2022-06-06 PROCEDURE — 86901 BLOOD TYPING SEROLOGIC RH(D): CPT

## 2022-06-06 PROCEDURE — 83690 ASSAY OF LIPASE: CPT

## 2022-06-06 PROCEDURE — 700102 HCHG RX REV CODE 250 W/ 637 OVERRIDE(OP): Performed by: EMERGENCY MEDICINE

## 2022-06-06 PROCEDURE — 85025 COMPLETE CBC W/AUTO DIFF WBC: CPT

## 2022-06-06 PROCEDURE — 86850 RBC ANTIBODY SCREEN: CPT

## 2022-06-06 PROCEDURE — 85610 PROTHROMBIN TIME: CPT

## 2022-06-06 PROCEDURE — 85730 THROMBOPLASTIN TIME PARTIAL: CPT

## 2022-06-06 PROCEDURE — 80053 COMPREHEN METABOLIC PANEL: CPT

## 2022-06-06 PROCEDURE — 86900 BLOOD TYPING SEROLOGIC ABO: CPT

## 2022-06-06 PROCEDURE — 99283 EMERGENCY DEPT VISIT LOW MDM: CPT

## 2022-06-06 PROCEDURE — A9270 NON-COVERED ITEM OR SERVICE: HCPCS | Performed by: EMERGENCY MEDICINE

## 2022-06-06 RX ORDER — AZITHROMYCIN 250 MG/1
500 TABLET, FILM COATED ORAL ONCE
Status: COMPLETED | OUTPATIENT
Start: 2022-06-06 | End: 2022-06-06

## 2022-06-06 RX ORDER — AZITHROMYCIN 500 MG/1
500 TABLET, FILM COATED ORAL DAILY
Qty: 2 TABLET | Refills: 0 | Status: SHIPPED | OUTPATIENT
Start: 2022-06-06 | End: 2022-06-08

## 2022-06-06 RX ORDER — POTASSIUM CHLORIDE 20 MEQ/1
20 TABLET, EXTENDED RELEASE ORAL DAILY
Qty: 7 TABLET | Refills: 0 | Status: SHIPPED | OUTPATIENT
Start: 2022-06-06 | End: 2022-09-26

## 2022-06-06 RX ADMIN — AZITHROMYCIN DIHYDRATE 500 MG: 250 TABLET, FILM COATED ORAL at 05:00

## 2022-06-06 ASSESSMENT — FIBROSIS 4 INDEX: FIB4 SCORE: 0.48

## 2022-06-06 NOTE — ED TRIAGE NOTES
"Chief Complaint   Patient presents with   • Bloody Stools     Pt into ED on 6/3 with ABD pain, pt has had consistent D since dc'd, yesterday began having red bloody stools, -pain/N/V, pt with difficulty eating/drinking       Ambulated to triage with partner for above complaint. Pt has had no relief with OTC meds or GI cocktail on last ED admission.     GI bleed protocol ordered. Pt brought to [facility] office for blood draw, given UA. Pt educated of triage process and informed to contact staff if situation changes.    /80   Pulse 92   Temp 35.8 °C (96.5 °F) (Temporal)   Resp 16   Ht 1.727 m (5' 8\")   Wt 94.6 kg (208 lb 8.9 oz)   LMP 05/28/2022   SpO2 97%   BMI 31.71 kg/m²       "

## 2022-06-06 NOTE — ED PROVIDER NOTES
ED Provider Note    CHIEF COMPLAINT  Chief Complaint   Patient presents with   • Bloody Stools     Pt into ED on 6/3 with ABD pain, pt has had consistent D since dc'd, yesterday began having red bloody stools, -pain/N/V, pt with difficulty eating/drinking       HPI  Rain CHE Rosado is a 27 y.o. female who presents with abdominal pain and diarrhea.  She states that it started on 6/3/2022.  She presented to the emergency department at that time and was evaluated and discharged.  She developed diarrhea and then presented to [facility] emergency department.  She was discharged with supportive care recommendations.  She states that she is here again due to persistent diarrhea since she was last seen.    When asked whether or not she had any recent travel, she states that she was in [place] about a week ago.  She flew back on the 31st.  She was there for about a month.  She did not have any diarrheal illness while in [place].  She states that she was staying with family and friends and eating in their homes.  No fevers.  She notes very faint amounts of bleeding in her stool about 3 times today.  She notes about 7-12 episodes of diarrhea daily since the onset of symptoms.  No abdominal surgeries in the past.  No recent antibiotic exposure.  No known recent sick contacts.    REVIEW OF SYSTEMS  See HPI for further details. All other systems are negative.     PAST MEDICAL HISTORY   has a past medical history of Anxiety, Pre-eclampsia in third trimester (12/28/2019), and Psychiatric disorder.    SOCIAL HISTORY  Social History     Tobacco Use   • Smoking status: Never Smoker   • Smokeless tobacco: Never Used   Vaping Use   • Vaping Use: Never used   Substance and Sexual Activity   • Alcohol use: Never   • Drug use: Never   • Sexual activity: Not Currently     Partners: Male     Birth control/protection: Condom       SURGICAL HISTORY   has a past surgical history that includes repeat c section (12/26/2019).    CURRENT  "MEDICATIONS  Home Medications     Reviewed by Leslie Khanna R.N. (Registered Nurse) on 06/06/22 at 0118  Med List Status: Partial   Medication Last Dose Status   azithromycin (ZITHROMAX) 500 MG tablet  Active   cyclobenzaprine (FLEXERIL) 5 mg tablet  Active   escitalopram (LEXAPRO) 10 MG Tab  Active   hydrOXYzine HCl (ATARAX) 25 MG Tab  Active   ibuprofen (MOTRIN) 800 MG Tab  Active                ALLERGIES  No Known Allergies    PHYSICAL EXAM  VITAL SIGNS: /80   Pulse 92   Temp 35.8 °C (96.5 °F) (Temporal)   Resp 16   Ht 1.727 m (5' 8\")   Wt 94.6 kg (208 lb 8.9 oz)   LMP 05/28/2022   SpO2 97%   BMI 31.71 kg/m²   Pulse ox interpretation: I interpret this pulse ox as normal.  Constitutional: Alert in no apparent distress.  HENT: No signs of trauma, Bilateral external ears normal, Nose normal.   Eyes:  Conjunctiva normal, Non-icteric.   Cardiovascular: Regular rate and rhythm.   Thorax & Lungs: Normal breath sounds, No respiratory distress  Abdomen: Soft, No tenderness  Neurologic: Alert, No focal deficits noted.       DIAGNOSTIC STUDIES / PROCEDURES    LABS  Labs Reviewed   CBC WITH DIFFERENTIAL - Abnormal; Notable for the following components:       Result Value    Hemoglobin 11.7 (*)     Hematocrit 35.7 (*)     MCV 79.3 (*)     MCH 26.0 (*)     MCHC 32.8 (*)     Lymphocytes 43.20 (*)     All other components within normal limits    Narrative:     Indicate which anticoagulants the patient is on:->UNKNOWN   COMP METABOLIC PANEL - Abnormal; Notable for the following components:    Potassium 3.0 (*)     Glucose 104 (*)     All other components within normal limits    Narrative:     Indicate which anticoagulants the patient is on:->UNKNOWN   COD (ADULT)   LIPASE    Narrative:     Indicate which anticoagulants the patient is on:->UNKNOWN   PROTHROMBIN TIME    Narrative:     Indicate which anticoagulants the patient is on:->UNKNOWN   APTT    Narrative:     Indicate which anticoagulants the patient is " on:->UNKNOWN   ESTIMATED GFR    Narrative:     Indicate which anticoagulants the patient is on:->UNKNOWN   CULTURE STOOL         RADIOLOGY  No orders to display         COURSE & MEDICAL DECISION MAKING    Medications   azithromycin (ZITHROMAX) tablet 500 mg (500 mg Oral Given 6/6/22 0500)       Pertinent Labs & Imaging studies reviewed. (See chart for details)  27 y.o. female presenting with abdominal discomfort and diarrhea.  She is in no distress and has a benign neck exam overall.  Normal vital signs.  She has rather profuse diarrhea however.  No obvious signs of hypovolemia.  No tachycardia or hypotension.  She notes some small amounts of blood  in the stool.  No recent antibiotic exposure.  She did recently travel to [place] however and diarrhea started 2 or 3 days after she came home from [place] where she stayed for about a month.  No fevers.    Laboratory studies show mild leukocytosis of uncertain significance.  May be a stress response from diarrheal illness.  No significant metabolic abnormalities except for a slightly low potassium.  Recommending outpatient potassium supplements over the next few days.  Likely related to diarrheal loss.  No evidence of renal failure.    I am concerned that the patient may have a travel related diarrheal illness.  Given the small amounts of blood identified in the stool, recommending antibiotics.  I did recommend strongly that the patient provide a stool sample for culturing however the patient is unable to provide a stool sample here throughout her stay.  She states that she would prefer to just take the antibiotics and not wait any longer to provide a stool sample.  The patient was given first dose of antibiotics here and was discharged with instructions to fill the prescription tomorrow and resume antibiotics for a total of 3 days of treatment.    The patient will not drink alcohol nor drive with prescribed medications.   The patient was instructed to follow-up with  "primary care physician for further management.  To return immediately for any worsening symptoms or development of any other concerning signs or symptoms. The patient verbalizes understanding in their own words.    /81   Pulse 75   Temp 36.1 °C (97 °F) (Temporal)   Resp 20   Ht 1.727 m (5' 8\")   Wt 94.6 kg (208 lb 8.9 oz)   LMP 05/28/2022   SpO2 98%   BMI 31.71 kg/m²     The patient was referred to primary care where they will receive further BP management.      Glendy Campbell A.P.R.NShae  [address] [address]  [phone]    Schedule an appointment as soon as possible for a visit       [facility], Emergency Dept  [address] [place] [id number] [phone]    As needed, If symptoms worsen      FINAL IMPRESSION  1. Diarrhea of infectious origin    2. Hypokalemia            Electronically signed by: Ho Moreno M.D., 6/6/2022 2:14 AM    "

## 2022-07-10 ENCOUNTER — HOSPITAL ENCOUNTER (EMERGENCY)
Facility: MEDICAL CENTER | Age: 27
End: 2022-07-10
Attending: EMERGENCY MEDICINE
Payer: COMMERCIAL

## 2022-07-10 VITALS
HEIGHT: 68 IN | BODY MASS INDEX: 31.98 KG/M2 | DIASTOLIC BLOOD PRESSURE: 75 MMHG | WEIGHT: 210.98 LBS | OXYGEN SATURATION: 99 % | SYSTOLIC BLOOD PRESSURE: 116 MMHG | HEART RATE: 86 BPM | TEMPERATURE: 98 F | RESPIRATION RATE: 18 BRPM

## 2022-07-10 DIAGNOSIS — F41.9 ANXIETY: ICD-10-CM

## 2022-07-10 LAB — EKG IMPRESSION: NORMAL

## 2022-07-10 PROCEDURE — 99284 EMERGENCY DEPT VISIT MOD MDM: CPT

## 2022-07-10 PROCEDURE — 93005 ELECTROCARDIOGRAM TRACING: CPT | Performed by: EMERGENCY MEDICINE

## 2022-07-10 PROCEDURE — A9270 NON-COVERED ITEM OR SERVICE: HCPCS | Performed by: EMERGENCY MEDICINE

## 2022-07-10 PROCEDURE — 700102 HCHG RX REV CODE 250 W/ 637 OVERRIDE(OP): Performed by: EMERGENCY MEDICINE

## 2022-07-10 PROCEDURE — 93005 ELECTROCARDIOGRAM TRACING: CPT

## 2022-07-10 RX ORDER — LORAZEPAM 1 MG/1
0.5 TABLET ORAL ONCE
Status: COMPLETED | OUTPATIENT
Start: 2022-07-10 | End: 2022-07-10

## 2022-07-10 RX ADMIN — LORAZEPAM 0.5 MG: 1 TABLET ORAL at 02:58

## 2022-07-10 ASSESSMENT — FIBROSIS 4 INDEX: FIB4 SCORE: 0.66

## 2022-07-10 NOTE — ED PROVIDER NOTES
ED Provider Note    CHIEF COMPLAINT  Chief Complaint   Patient presents with   • Anxiety     Pt has hx of anxiety and takes hydroxyzine PRN which she took at 0030.  Pt's still experiencing anxiety and palpitations.        HPI  Rain CHE Rosado is a 27 y.o. female who presents the emergency room with acute on chronic anxiety. Past medical history as documented below. Chronic anxiety. Currently on hydroxyzine. States that she did take her regular medication I but did not have any significant benefit. Denies any significant new stresses although is the mother of three children. No recent illness. No known thyroid disorder. States that she had previously lived another state where she was going outpatient therapy but this is not currently happening. She did feel like it was beneficial. No suicidal homicidal ideation spear denies any illicit substances or stimulant use.    REVIEW OF SYSTEMS  See HPI for further details. All other systems are negative.     PAST MEDICAL HISTORY   has a past medical history of Anxiety, Pre-eclampsia in third trimester (12/28/2019), and Psychiatric disorder.    SOCIAL HISTORY  Social History     Tobacco Use   • Smoking status: Never Smoker   • Smokeless tobacco: Never Used   Vaping Use   • Vaping Use: Never used   Substance and Sexual Activity   • Alcohol use: Never   • Drug use: Never   • Sexual activity: Not Currently     Partners: Male     Birth control/protection: Condom       SURGICAL HISTORY   has a past surgical history that includes repeat c section (12/26/2019).    CURRENT MEDICATIONS  Home Medications     Reviewed by Fabiola Polk R.N. (Registered Nurse) on 07/10/22 at 0111  Med List Status: Not Addressed   Medication Last Dose Status   cyclobenzaprine (FLEXERIL) 5 mg tablet  Active   escitalopram (LEXAPRO) 10 MG Tab  Active   hydrOXYzine HCl (ATARAX) 25 MG Tab  Active   ibuprofen (MOTRIN) 800 MG Tab  Active   potassium chloride SA (KDUR) 20 MEQ Tab CR  Active           "      ALLERGIES  No Known Allergies    PHYSICAL EXAM  VITAL SIGNS: /78   Pulse 92   Temp 36.5 °C (97.7 °F) (Temporal)   Resp 16   Ht 1.727 m (5' 8\")   Wt 95.7 kg (210 lb 15.7 oz)   LMP  (LMP Unknown) Comment: unable to remmeber  SpO2 99%   BMI 32.08 kg/m²  @[email or web address][[id number]::@  Pulse ox interpretation: I interpret this pulse ox as normal.  Constitutional: Alert in no apparent distress.  HENT: Normocephalic, Atraumatic, Bilateral external ears normal. Nose normal.   Eyes: Pupils are equal and reactive. Conjunctiva normal, non-icteric.   Heart: Regular rate and rythm, no murmurs.    Lungs: Clear to auscultation bilaterally.  Skin: Warm, Dry, No erythema, No rash.   Neurologic: Alert, Grossly non-focal.   Psychiatric: Affect mildly anxious, Judgment normal, Mood normal, Appears appropriate and not intoxicated. No SI or HI        COURSE & MEDICAL DECISION MAKING  Pertinent Labs & Imaging studies reviewed. (See chart for details)  27-year-old female presenting the emergency department with the above presentation. I will give her additional Ativan here. She is to continue regular medications. She has been provided with referrals to local behavioral health teams for further outpatient therapy. Understanding return presents to the ER.   The patient will return for worsening symptoms and is stable at the time of discharge. The patient verbalizes understanding and will comply.    FINAL IMPRESSION  1. Anxiety               Electronically signed by: Elier Desir M.D., 7/10/2022 2:49 AM        "

## 2022-07-10 NOTE — ED TRIAGE NOTES
"Chief Complaint   Patient presents with   • Anxiety     Pt has hx of anxiety and takes hydroxyzine PRN which she took at 0030.  Pt's still experiencing anxiety and palpitations.      Pt ambulatory to triage for above complaint. Pt educated on triage process and informed to alert staff of any changes or concerns.    /78   Pulse 92   Temp 36.5 °C (97.7 °F) (Temporal)   Resp 16   Ht 1.727 m (5' 8\")   Wt 95.7 kg (210 lb 15.7 oz)   LMP  (LMP Unknown) Comment: unable to remmeber  SpO2 99%   BMI 32.08 kg/m²     "

## 2022-07-19 ENCOUNTER — TELEPHONE (OUTPATIENT)
Dept: MEDICAL GROUP | Facility: MEDICAL CENTER | Age: 27
End: 2022-07-19
Payer: COMMERCIAL

## 2022-07-19 DIAGNOSIS — F41.9 ANXIETY: ICD-10-CM

## 2022-07-19 NOTE — TELEPHONE ENCOUNTER
"Pt lvm stating she would like a referral placed for therapy, she stated that her anxiety is \"really bad\" and her meds are not working. Would like a referral to psychiatry.   "

## 2022-07-28 ENCOUNTER — OFFICE VISIT (OUTPATIENT)
Dept: MEDICAL GROUP | Facility: MEDICAL CENTER | Age: 27
End: 2022-07-28
Attending: NURSE PRACTITIONER
Payer: COMMERCIAL

## 2022-07-28 VITALS
RESPIRATION RATE: 16 BRPM | SYSTOLIC BLOOD PRESSURE: 106 MMHG | DIASTOLIC BLOOD PRESSURE: 70 MMHG | HEIGHT: 68 IN | OXYGEN SATURATION: 98 % | HEART RATE: 67 BPM | TEMPERATURE: 97 F | BODY MASS INDEX: 31.46 KG/M2 | WEIGHT: 207.6 LBS

## 2022-07-28 DIAGNOSIS — B07.9 VIRAL WARTS, UNSPECIFIED TYPE: ICD-10-CM

## 2022-07-28 PROCEDURE — 99213 OFFICE O/P EST LOW 20 MIN: CPT | Mod: 25 | Performed by: NURSE PRACTITIONER

## 2022-07-28 PROCEDURE — 17110 DESTRUCTION B9 LES UP TO 14: CPT | Performed by: NURSE PRACTITIONER

## 2022-07-28 ASSESSMENT — ENCOUNTER SYMPTOMS
ABDOMINAL PAIN: 0
SHORTNESS OF BREATH: 0
CHILLS: 0
COUGH: 0
CONSTIPATION: 0
BLOOD IN STOOL: 0
FEVER: 0
WEIGHT LOSS: 0
PALPITATIONS: 0
DIARRHEA: 0
WHEEZING: 0

## 2022-07-28 ASSESSMENT — FIBROSIS 4 INDEX: FIB4 SCORE: 0.66

## 2022-07-28 ASSESSMENT — PATIENT HEALTH QUESTIONNAIRE - PHQ9: CLINICAL INTERPRETATION OF PHQ2 SCORE: 0

## 2022-07-28 NOTE — PROGRESS NOTES
Chief Complaint   Patient presents with   • Bump       Subjective:     HPI:   Rain Rosado is a 27 y.o. female here to discuss the evaluation and management of:        Viral warts  Present on her right little finger, left 1st toe, and a cluster on her left small toe.  Present for years, but painful for months. She has trouble walking and standing 2/2 discomfort.  She has not tried treatment. She denies ulceration and drainage.       ROS  Review of Systems   Constitutional: Negative for chills, fever, malaise/fatigue and weight loss.   Respiratory: Negative for cough, shortness of breath and wheezing.    Cardiovascular: Negative for chest pain, palpitations and leg swelling.   Gastrointestinal: Negative for abdominal pain, blood in stool, constipation and diarrhea.         No Known Allergies    Current medicines (including changes today)  Current Outpatient Medications   Medication Sig Dispense Refill   • potassium chloride SA (KDUR) 20 MEQ Tab CR Take 1 Tablet by mouth every day. 7 Tablet 0   • cyclobenzaprine (FLEXERIL) 5 mg tablet Take 1-2 Tablets by mouth 3 times a day as needed for Moderate Pain. 90 tablet 3   • ibuprofen (MOTRIN) 800 MG Tab Take 1 Tab by mouth every 8 hours as needed for Moderate Pain. 90 Tab 5   • escitalopram (LEXAPRO) 10 MG Tab Take 1 Tab by mouth every bedtime. 30 Tab 2   • hydrOXYzine HCl (ATARAX) 25 MG Tab Take 1-2 Tabs by mouth 3 times a day as needed for Anxiety. 90 Tab 11     No current facility-administered medications for this visit.       Social History     Tobacco Use   • Smoking status: Never Smoker   • Smokeless tobacco: Never Used   Vaping Use   • Vaping Use: Never used   Substance Use Topics   • Alcohol use: Never   • Drug use: Never       Patient Active Problem List    Diagnosis Date Noted   • Viral warts 2022   • Chlamydia contact 2021   • Encounter to establish care 2021   • Anxiety 2021   • Thoracic back pain 2021   • S/P   "section 12/28/2019   • Acute post-operative pain 12/28/2019   • Pre-eclampsia in third trimester 12/28/2019       Family History   Problem Relation Age of Onset   • Diabetes Father    • Diabetes Paternal Aunt    • Diabetes Maternal Grandmother    • Heart Disease Maternal Grandfather           Objective:     /70 (BP Location: Left arm, Patient Position: Sitting, BP Cuff Size: Adult)   Pulse 67   Temp 36.1 °C (97 °F) (Temporal)   Resp 16   Ht 1.727 m (5' 8\")   Wt 94.2 kg (207 lb 9.6 oz)   SpO2 98%  Body mass index is 31.57 kg/m².    Physical Exam:  Physical Exam  Constitutional:       General: She is not in acute distress.  HENT:      Head: Normocephalic.      Right Ear: Tympanic membrane and external ear normal.      Left Ear: Tympanic membrane and external ear normal.   Eyes:      Conjunctiva/sclera: Conjunctivae normal.      Pupils: Pupils are equal, round, and reactive to light.   Neck:      Trachea: No tracheal deviation.   Cardiovascular:      Rate and Rhythm: Normal rate and regular rhythm.      Heart sounds: Normal heart sounds.   Pulmonary:      Effort: Pulmonary effort is normal.      Breath sounds: Normal breath sounds.   Abdominal:      General: Bowel sounds are normal.      Palpations: Abdomen is soft.   Musculoskeletal:         General: Normal range of motion.      Cervical back: Normal range of motion and neck supple.   Lymphadenopathy:      Head:      Right side of head: No preauricular adenopathy.      Left side of head: No preauricular adenopathy.      Cervical: No cervical adenopathy.   Skin:     General: Skin is warm and dry.      Findings: Lesion present.      Comments: Fleshy maculopapular lesions of right small finger, left 1st toe, and left small toe.    Neurological:      Mental Status: She is alert and oriented to person, place, and time.      Cranial Nerves: Cranial nerves are intact.      Sensory: Sensation is intact.      Gait: Gait is intact.   Psychiatric:         Mood and " Affect: Affect normal.         Judgment: Judgment normal.     CRYOTHERAPY:  Discussed risks and benefits of cryotherapy. Patient verbally agreed. 3 applications of cryotherapy were applied to viral wart lesions on right small finger, left first toe, and left small toe. Patient tolerated procedure well. Aftercare instructions given.      Assessment and Plan:     The following treatment plan was discussed:    1. Viral warts, unspecified type  Referral to Podiatry  -Chronic problem, unstable.  Referral to podiatry as patient does have a large cluster on her left small toe that is impeding her ability to walk and work.       Any change or worsening of signs or symptoms, patient encouraged to follow-up or report to emergency room for further evaluation. Patient verbalizes understanding and agrees.    Follow-Up: Return in about 2 weeks (around 8/11/2022) for cryo.      PLEASE NOTE: This dictation was created using voice recognition software. I have made every reasonable attempt to correct obvious errors, but I expect that there are errors of grammar and possibly content that I did not discover before finalizing the note.

## 2022-07-28 NOTE — ASSESSMENT & PLAN NOTE
Present on her right little finger, left 1st toe, and a cluster on her left small toe.  Present for years, but painful for months. She has trouble walking and standing 2/2 discomfort.  She has not tried treatment. She denies ulceration and drainage.

## 2022-08-05 ENCOUNTER — HOSPITAL ENCOUNTER (EMERGENCY)
Facility: MEDICAL CENTER | Age: 27
End: 2022-08-05
Attending: EMERGENCY MEDICINE
Payer: COMMERCIAL

## 2022-08-05 VITALS
OXYGEN SATURATION: 99 % | BODY MASS INDEX: 31.91 KG/M2 | TEMPERATURE: 98 F | HEART RATE: 70 BPM | WEIGHT: 209.88 LBS | SYSTOLIC BLOOD PRESSURE: 114 MMHG | RESPIRATION RATE: 16 BRPM | DIASTOLIC BLOOD PRESSURE: 62 MMHG

## 2022-08-05 DIAGNOSIS — R68.89 FLU-LIKE SYMPTOMS: Primary | ICD-10-CM

## 2022-08-05 DIAGNOSIS — R30.0 DYSURIA: ICD-10-CM

## 2022-08-05 DIAGNOSIS — F41.9 ANXIETY: ICD-10-CM

## 2022-08-05 LAB
APPEARANCE UR: CLEAR
BACTERIA #/AREA URNS HPF: NEGATIVE /HPF
BILIRUB UR QL STRIP.AUTO: NEGATIVE
COLOR UR: YELLOW
EPI CELLS #/AREA URNS HPF: ABNORMAL /HPF
GLUCOSE UR STRIP.AUTO-MCNC: NEGATIVE MG/DL
HCG UR QL: NEGATIVE
HYALINE CASTS #/AREA URNS LPF: ABNORMAL /LPF
KETONES UR STRIP.AUTO-MCNC: NEGATIVE MG/DL
LEUKOCYTE ESTERASE UR QL STRIP.AUTO: ABNORMAL
MICRO URNS: ABNORMAL
NITRITE UR QL STRIP.AUTO: NEGATIVE
PH UR STRIP.AUTO: 7.5 [PH] (ref 5–8)
PROT UR QL STRIP: NEGATIVE MG/DL
RBC # URNS HPF: ABNORMAL /HPF
RBC UR QL AUTO: NEGATIVE
SARS-COV-2 RNA RESP QL NAA+PROBE: NOTDETECTED
SP GR UR STRIP.AUTO: 1.01
SPECIMEN SOURCE: NORMAL
UROBILINOGEN UR STRIP.AUTO-MCNC: 0.2 MG/DL
WBC #/AREA URNS HPF: ABNORMAL /HPF

## 2022-08-05 PROCEDURE — 36415 COLL VENOUS BLD VENIPUNCTURE: CPT

## 2022-08-05 PROCEDURE — U0005 INFEC AGEN DETEC AMPLI PROBE: HCPCS

## 2022-08-05 PROCEDURE — 99283 EMERGENCY DEPT VISIT LOW MDM: CPT

## 2022-08-05 PROCEDURE — 81001 URINALYSIS AUTO W/SCOPE: CPT

## 2022-08-05 PROCEDURE — 81025 URINE PREGNANCY TEST: CPT

## 2022-08-05 PROCEDURE — U0003 INFECTIOUS AGENT DETECTION BY NUCLEIC ACID (DNA OR RNA); SEVERE ACUTE RESPIRATORY SYNDROME CORONAVIRUS 2 (SARS-COV-2) (CORONAVIRUS DISEASE [COVID-19]), AMPLIFIED PROBE TECHNIQUE, MAKING USE OF HIGH THROUGHPUT TECHNOLOGIES AS DESCRIBED BY CMS-2020-01-R: HCPCS

## 2022-08-05 RX ORDER — HYDROXYZINE HYDROCHLORIDE 25 MG/1
25-50 TABLET, FILM COATED ORAL 3 TIMES DAILY PRN
Qty: 90 TABLET | Refills: 11 | Status: SHIPPED | OUTPATIENT
Start: 2022-08-05 | End: 2022-08-30 | Stop reason: SDUPTHER

## 2022-08-05 RX ORDER — IBUPROFEN 800 MG/1
800 TABLET ORAL EVERY 8 HOURS PRN
Qty: 20 TABLET | Refills: 0 | Status: SHIPPED | OUTPATIENT
Start: 2022-08-05 | End: 2022-08-08

## 2022-08-05 RX ORDER — ACETAMINOPHEN 500 MG
1000 TABLET ORAL EVERY 6 HOURS PRN
Qty: 20 TABLET | Refills: 0 | Status: SHIPPED | OUTPATIENT
Start: 2022-08-05 | End: 2022-08-09

## 2022-08-05 RX ORDER — PHENAZOPYRIDINE HYDROCHLORIDE 200 MG/1
200 TABLET, FILM COATED ORAL 3 TIMES DAILY PRN
Qty: 6 TABLET | Refills: 0 | Status: SHIPPED | OUTPATIENT
Start: 2022-08-05 | End: 2022-09-26

## 2022-08-05 ASSESSMENT — FIBROSIS 4 INDEX: FIB4 SCORE: 0.66

## 2022-08-05 NOTE — TELEPHONE ENCOUNTER
Received request via: Pharmacy    Was the patient seen in the last year in this department? Yes    Does the patient have an active prescription (recently filled or refills available) for medication(s) requested? No     Last visit 7/28/22

## 2022-08-05 NOTE — ED PROVIDER NOTES
ED Provider Note    Scribed for Lee Meier by Gail Domínguez. 8/5/2022  12:43 AM    Primary care provider: KEO Rothman  Means of arrival: Walk-In  History obtained from: Patient  History limited by: None    CHIEF COMPLAINT  Chief Complaint   Patient presents with   • Flu Like Symptoms     X 1 day. Body aches.    • Painful Urination     X 2 days. Denies blood/discharge. Hx UTIs.      HPI  Rain CHE Rosado is a 27 y.o. female, with a prior history of UTIs, who presents to the Emergency Department for evaluation of mild dysuria onset two days ago. She is also experiencing associated urinary frequency, body aches, and fatigue. She is vaccinated and boosted for covid, and denies any known sick contact. She denies any bloody in her urine, abnormal vaginal discharge, fever, nausea, vomiting, diarrhea, cough, or shortness of breath. She has no concerns for STDs today. She further denies any alcohol, tobacco, or drug use. She is otherwise healthy, and only takes daily medications for anxiety.   Quality: burning  Duration: 2 days  Severity: mild  Associated sx: urinary frequency, body aches, and fatigue    REVIEW OF SYSTEMS  See HPI for further details.     PAST MEDICAL HISTORY   has a past medical history of Anxiety, Pre-eclampsia in third trimester (12/28/2019), and Psychiatric disorder.  SURGICAL HISTORY   has a past surgical history that includes repeat c section (12/26/2019).  SOCIAL HISTORY  Social History     Tobacco Use   • Smoking status: Never Smoker   • Smokeless tobacco: Never Used   Vaping Use   • Vaping Use: Never used   Substance Use Topics   • Alcohol use: Never   • Drug use: Never      Social History     Substance and Sexual Activity   Drug Use Never     FAMILY HISTORY  Family History   Problem Relation Age of Onset   • Diabetes Father    • Diabetes Paternal Aunt    • Diabetes Maternal Grandmother    • Heart Disease Maternal Grandfather      CURRENT MEDICATIONS  Current  Outpatient Medications   Medication Instructions   • cyclobenzaprine (FLEXERIL) 5-10 mg, Oral, 3 TIMES DAILY PRN   • escitalopram (LEXAPRO) 10 mg, Oral, EVERY BEDTIME   • hydrOXYzine HCl (ATARAX) 25-50 mg, Oral, 3 TIMES DAILY PRN   • ibuprofen (MOTRIN) 800 mg, Oral, EVERY 8 HOURS PRN   • potassium chloride SA (KDUR) 20 MEQ Tab CR 20 mEq, Oral, DAILY     ALLERGIES  No Known Allergies    PHYSICAL EXAM    VITAL SIGNS:   Vitals:    08/05/22 0004 08/05/22 0021 08/05/22 0225   BP: 111/66  117/69   Pulse: 87  76   Resp: 16  16   Temp: 36.8 °C (98.3 °F)     TempSrc: Temporal     SpO2: 99%  99%   Weight:  95.2 kg (209 lb 14.1 oz)        Vitals: My interpretation: normotensive, not tachycardic, afebrile, not hypoxic    Reinterpretation of vitals: Unchanged    PE:   Constitutional: Well developed, Well nourished, No acute distress, Non-toxic appearance.   HENT: Normocephalic, Atraumatic, Bilateral external ears normal, Oropharynx is clear mucous membranes are moist. No oral exudates or nasal discharge.   Eyes: Pupils are equal round and reactive, EOMI, Conjunctiva normal, No discharge.   Neck: Normal range of motion, No tenderness, Supple, No stridor. No meningismus.  Lymphatic: No lymphadenopathy noted.   Cardiovascular: Regular rate and rhythm without murmur rub or gallop.  Thorax & Lungs: Clear breath sounds bilaterally without wheezes, rhonchi or rales. There is no chest wall tenderness.   Abdomen: Soft non-tender non-distended. There is no rebound or guarding. No organomegaly is appreciated. Bowel sounds are normal.  Skin: Normal without rash.   Back: No CVA or spinal tenderness.   Extremities: Intact distal pulses, No edema, No tenderness, No cyanosis, No clubbing. Capillary refill is less than 2 seconds.  Musculoskeletal: Good range of motion in all major joints. No tenderness to palpation or major deformities noted.   Neurologic: Alert & oriented x 3, Normal motor function, Normal sensory function, No focal deficits  noted. Reflexes are normal.  Psychiatric: Affect normal, Judgment normal, Mood normal. There is no suicidal ideation or patient reported hallucinations.     DIAGNOSTIC STUDIES / PROCEDURES    COURSE & MEDICAL DECISION MAKING  Nursing notes, VS, PMSFHx, labs, imaging, EKG reviewed in chart.      MDM: 12:43 AM Rain Rosado is a 27 y.o. female who presented with some mild flulike symptoms of body aches for the past few days as well as dysuria.  She had a history of a UTI remotely and stated that this feels somewhat similar although not as severe.  She denies a history of fever.  She is a benign physical exam.  Vital signs x2 are unremarkable.  She denies any vaginal discharge or concerns for STD.  Her pregnancy test and urinalysis are unremarkable.  Possibly interstitial cystitis and will start the patient on NSAIDs, Pyridium, and have close follow-up with her outpatient team.  She verbalized understanding plan, is well-appearing, abdomen soft, in no acute distress and normal vital signs at time of discharge.  She did receive a 24-hour COVID test which we back and [facility] and she verbalized understanding following CDC guidelines if she is positive.  The patient will return for new or worsening symptoms and is stable at the time of discharge.    The patient is referred to a primary physician for blood pressure management, diabetic screening, and for all other preventative health concerns.    DISPOSITION:  Patient will be discharged home in stable condition.    FOLLOW UP:  No follow-up provider specified.    OUTPATIENT MEDICATIONS:  New Prescriptions    No medications on file      FINAL IMPRESSION  1. Flu-like symptoms Acute   2. Dysuria Acute         Gail BUNN), zina scribing for, and in the presence of, Lee Meier.    Electronically signed by: Gail Francis), 8/5/2022    Lee BUNN personally performed the services described in this  documentation, as scribed by Gail Domínguez in my presence, and it is both accurate and complete.    The note accurately reflects work and decisions made by me.  Lee Meier  8/5/2022  2:27 AM

## 2022-08-05 NOTE — DISCHARGE INSTRUCTIONS
I want you to start taking the medication called Pyridium, you can take this daily to see if it helps with her symptoms.  He can also take Tylenol and Motrin.  Your urinalysis did not show any signs of infection.  Please follow-up with your PCP for other evaluation of possible causes.  [facility] results which should be back in 24 to 48 hours.  If you have any worsening symptoms or concerns, please return to the ED for further evaluation and treatment.  Thank you for coming in today.

## 2022-09-12 DIAGNOSIS — F41.9 ANXIETY: ICD-10-CM

## 2022-09-12 DIAGNOSIS — G89.29 CHRONIC MIDLINE THORACIC BACK PAIN: ICD-10-CM

## 2022-09-12 DIAGNOSIS — M54.6 CHRONIC MIDLINE THORACIC BACK PAIN: ICD-10-CM

## 2022-09-15 RX ORDER — IBUPROFEN 800 MG/1
800 TABLET ORAL EVERY 8 HOURS PRN
Qty: 90 TABLET | Refills: 11 | Status: SHIPPED | OUTPATIENT
Start: 2022-09-15 | End: 2022-10-01 | Stop reason: SDUPTHER

## 2022-09-15 RX ORDER — CYCLOBENZAPRINE HCL 5 MG
5-10 TABLET ORAL 3 TIMES DAILY PRN
Qty: 90 TABLET | Refills: 5 | Status: SHIPPED | OUTPATIENT
Start: 2022-09-15 | End: 2022-10-01 | Stop reason: SDUPTHER

## 2022-09-15 RX ORDER — HYDROXYZINE HYDROCHLORIDE 25 MG/1
25-50 TABLET, FILM COATED ORAL 3 TIMES DAILY PRN
Qty: 90 TABLET | Refills: 11 | Status: SHIPPED | OUTPATIENT
Start: 2022-09-15 | End: 2022-10-01 | Stop reason: SDUPTHER

## 2022-09-19 ENCOUNTER — TELEPHONE (OUTPATIENT)
Dept: MEDICAL GROUP | Facility: MEDICAL CENTER | Age: 27
End: 2022-09-19
Payer: COMMERCIAL

## 2022-09-19 NOTE — TELEPHONE ENCOUNTER
DOCUMENTATION OF PAR STATUS:    1. Name of Medication & Dose:  cyclobenzaprine (FLEXERIL) 5 mg tablet       2. Name of Prescription Coverage Company & phone #: [employer]    3. Date Prior Auth Submitted: 09/19/22    4. What information was given to obtain insurance decision? Chart notes, icd-10 codes,med hx     5. Prior Auth Status? Pending    6. Patient Notified: N\A

## 2022-09-23 ENCOUNTER — HOSPITAL ENCOUNTER (EMERGENCY)
Facility: MEDICAL CENTER | Age: 27
End: 2022-09-23
Attending: EMERGENCY MEDICINE
Payer: COMMERCIAL

## 2022-09-23 VITALS
HEART RATE: 60 BPM | BODY MASS INDEX: 31.24 KG/M2 | RESPIRATION RATE: 15 BRPM | WEIGHT: 206.13 LBS | TEMPERATURE: 98 F | DIASTOLIC BLOOD PRESSURE: 87 MMHG | SYSTOLIC BLOOD PRESSURE: 120 MMHG | HEIGHT: 68 IN | OXYGEN SATURATION: 100 %

## 2022-09-23 DIAGNOSIS — M25.511 ACUTE PAIN OF RIGHT SHOULDER: ICD-10-CM

## 2022-09-23 PROCEDURE — 700102 HCHG RX REV CODE 250 W/ 637 OVERRIDE(OP): Performed by: EMERGENCY MEDICINE

## 2022-09-23 PROCEDURE — A9270 NON-COVERED ITEM OR SERVICE: HCPCS | Performed by: EMERGENCY MEDICINE

## 2022-09-23 PROCEDURE — 99283 EMERGENCY DEPT VISIT LOW MDM: CPT

## 2022-09-23 RX ORDER — OXYCODONE HYDROCHLORIDE AND ACETAMINOPHEN 5; 325 MG/1; MG/1
1 TABLET ORAL EVERY 8 HOURS PRN
Qty: 10 TABLET | Refills: 0 | Status: SHIPPED | OUTPATIENT
Start: 2022-09-23 | End: 2022-09-26

## 2022-09-23 RX ORDER — OXYCODONE HYDROCHLORIDE AND ACETAMINOPHEN 5; 325 MG/1; MG/1
1 TABLET ORAL ONCE
Status: COMPLETED | OUTPATIENT
Start: 2022-09-23 | End: 2022-09-23

## 2022-09-23 RX ORDER — IBUPROFEN 600 MG/1
600 TABLET ORAL ONCE
Status: COMPLETED | OUTPATIENT
Start: 2022-09-23 | End: 2022-09-23

## 2022-09-23 RX ADMIN — IBUPROFEN 600 MG: 600 TABLET ORAL at 19:54

## 2022-09-23 RX ADMIN — OXYCODONE AND ACETAMINOPHEN 1 TABLET: 5; 325 TABLET ORAL at 19:54

## 2022-09-23 ASSESSMENT — FIBROSIS 4 INDEX: FIB4 SCORE: 0.66

## 2022-09-24 NOTE — ED PROVIDER NOTES
"ED Provider Note    CHIEF COMPLAINT  Chief Complaint   Patient presents with    Back Pain    Neck Pain       HPI  Rain Rosado is a 27 y.o. female who presents with back pain.  The patient states around 4 PM when she was taking a shower she developed pain in the right scapular region.  She states with certain movements it radiates up into the right side of her neck.  She does not have any radicular pain down her right arm.  She does not have any paresthesias nor functional loss of her upper extremities.  She has not had any associated fevers.  She is unaware of any trauma.  She does have some increased pain with inspiration but does not have any risk factors from a DVT standpoint.    REVIEW OF SYSTEMS  No pain or swelling to the lower extremities, no recent fevers, no recent cough    PHYSICAL EXAM  VITAL SIGNS: /75   Pulse 82   Temp 36.7 °C (98.1 °F) (Temporal)   Resp 18   Ht 1.727 m (5' 8\")   Wt 93.5 kg (206 lb 2.1 oz)   SpO2 100%   BMI 31.34 kg/m²   In general the patient does not appear toxic    Cervical, thoracic, lumbar spine does not have any midline tenderness nor step-offs    Pulmonary chest clear to auscultation bilaterally    Extremities the patient has reproducible pain in the right scapular region consistent with a muscular source      COURSE & MEDICAL DECISION MAKING  Pertinent Labs & Imaging studies reviewed. (See chart for details)  This a 27-year-old female who presents with right scapular discomfort.  I suspect this is from a muscular source.  The patient is not hypoxic nor tachycardic to support a pulmonary embolus.  The patient will receive Percocet and Motrin for acute pain control in the emergency department.  She will be discharged with instructions to take Motrin every 8 hours.  I will perform a narcotic check and she will take narcotic sparingly only as needed if the Motrin is not effective.  She will return if she is not better in 48 to 72 hours and sooner if " worse.    FINAL IMPRESSION  1.  Right posterior shoulder pain       Disposition  The patient will be discharged in stable condition      Electronically signed by: Christopher Leon M.D., 9/23/2022 7:49 PM

## 2022-09-24 NOTE — ED TRIAGE NOTES
"Chief Complaint   Patient presents with    Back Pain    Neck Pain     Pt arrives with the c/o right upper back pain that radiates to her right neck x3 days. Pt has hx of same when she carried her infants car seat. Pt states that tylenol and ibuprofen is not helping.   Pain exacerbated with deep breath. Pain rated at an 8/10.  Pt states she has anxiety and wonders if pain is from that.    Pt has her 3 children with her.     /75   Pulse 82   Temp 36.7 °C (98.1 °F) (Temporal)   Resp 18   Ht 1.727 m (5' 8\")   Wt 93.5 kg (206 lb 2.1 oz)   SpO2 100%   BMI 31.34 kg/m²     "

## 2022-09-24 NOTE — DISCHARGE INSTRUCTIONS
Take Motrin as discussed for pain control.  If this is not effective take the Percocet.  Return if you are not better in 4 to 5 days and sooner if worse.

## 2022-09-24 NOTE — ED NOTES
Pt provided with discharge instructions. Pt verbalized understanding. Pt assisted out of ed with steady gait.

## 2022-09-26 ENCOUNTER — OFFICE VISIT (OUTPATIENT)
Dept: MEDICAL GROUP | Facility: MEDICAL CENTER | Age: 27
End: 2022-09-26
Attending: FAMILY MEDICINE
Payer: COMMERCIAL

## 2022-09-26 VITALS
SYSTOLIC BLOOD PRESSURE: 112 MMHG | OXYGEN SATURATION: 98 % | HEIGHT: 68 IN | WEIGHT: 207 LBS | RESPIRATION RATE: 16 BRPM | HEART RATE: 88 BPM | BODY MASS INDEX: 31.37 KG/M2 | DIASTOLIC BLOOD PRESSURE: 72 MMHG | TEMPERATURE: 97.3 F

## 2022-09-26 DIAGNOSIS — R09.1 PLEURISY: ICD-10-CM

## 2022-09-26 PROCEDURE — 99212 OFFICE O/P EST SF 10 MIN: CPT | Performed by: FAMILY MEDICINE

## 2022-09-26 PROCEDURE — 700111 HCHG RX REV CODE 636 W/ 250 OVERRIDE (IP): Performed by: FAMILY MEDICINE

## 2022-09-26 PROCEDURE — 99213 OFFICE O/P EST LOW 20 MIN: CPT | Performed by: FAMILY MEDICINE

## 2022-09-26 RX ORDER — KETOROLAC TROMETHAMINE 30 MG/ML
60 INJECTION, SOLUTION INTRAMUSCULAR; INTRAVENOUS ONCE
Status: COMPLETED | OUTPATIENT
Start: 2022-09-26 | End: 2022-09-26

## 2022-09-26 RX ORDER — NAPROXEN 500 MG/1
500 TABLET ORAL 2 TIMES DAILY WITH MEALS
Qty: 30 TABLET | Refills: 0 | Status: ON HOLD | OUTPATIENT
Start: 2022-09-26 | End: 2023-11-17

## 2022-09-26 RX ADMIN — KETOROLAC TROMETHAMINE 60 MG: 30 INJECTION, SOLUTION INTRAMUSCULAR at 14:59

## 2022-09-26 ASSESSMENT — FIBROSIS 4 INDEX: FIB4 SCORE: 0.66

## 2022-09-26 NOTE — PROGRESS NOTES
"Subjective     Rain CHE Rosado is a 27 y.o. female who presents with Back Pain            HPI 1.  Right-sided chest pain-patient reports rather abrupt onset of pain from the base of her neck radiating out to the lateral aspect of her right scapular area on 9/23/2022.  She does not recall any trauma or overuse.  Pain was somewhat pleuritic in nature prompting her to be seen at [facility] emergency room that day.  She was given a single tablet of Percocet and possibly a small prescription however her pharmacy would not fill that prescription.  Due to continued pain she was seen at [facility] ER on Saturday, 9/24/2022.  She reports she had a single view x-ray which she was told was unremarkable.  She was given a injection of Toradol at [facility] which was helpful and a prescription for Naprosyn and Robaxin however given her pharmacy would not fill them allegedly because they were from an emergency room.  She reports currently pain is perhaps 40% less but still persistent.    ROS negative for cough, hemoptysis, dyspnea, fever, nasal congestion.  Patient does not recall URI in the past 6 weeks.           Objective     /72   Pulse 88   Temp 36.3 °C (97.3 °F)   Resp 16   Ht 1.727 m (5' 7.99\")   Wt 93.9 kg (207 lb)   SpO2 98%   BMI 31.48 kg/m²      Physical Exam  Gen.- alert, cooperative, in no acute distress  Neck- midline trachea, thyroid not enlarged or tender,supple, no cervical adenopathy  Chest-clear to auscultation and percussion with normal breath sounds. No retractions. Chest wall nontender  Cardiac- regular rhythm and rate. No murmur, thrill, or heave  Back-nontender to palpation over the posterior aspect of the neck, bilateral scapular areas                      Assessment & Plan        1. Pleurisy      Plan: 1.  Toradol 60 mg IM  2.  Trial of Naprosyn 500 mg twice daily with food for up to 2 weeks (GI precautions reviewed)  3.  If discomfort persists patient will present for reexam             "

## 2022-10-06 PROCEDURE — RXMED WILLOW AMBULATORY MEDICATION CHARGE: Performed by: FAMILY MEDICINE

## 2022-10-10 ENCOUNTER — PHARMACY VISIT (OUTPATIENT)
Dept: PHARMACY | Facility: MEDICAL CENTER | Age: 27
End: 2022-10-10
Payer: MEDICARE

## 2022-10-20 PROCEDURE — RXMED WILLOW AMBULATORY MEDICATION CHARGE: Performed by: NURSE PRACTITIONER

## 2022-10-25 ENCOUNTER — PHARMACY VISIT (OUTPATIENT)
Dept: PHARMACY | Facility: MEDICAL CENTER | Age: 27
End: 2022-10-25
Payer: MEDICARE

## 2023-01-27 ENCOUNTER — PHARMACY VISIT (OUTPATIENT)
Dept: PHARMACY | Facility: MEDICAL CENTER | Age: 28
End: 2023-01-27
Payer: MEDICARE

## 2023-01-27 PROCEDURE — RXMED WILLOW AMBULATORY MEDICATION CHARGE: Performed by: NURSE PRACTITIONER

## 2023-07-20 PROCEDURE — RXMED WILLOW AMBULATORY MEDICATION CHARGE: Performed by: NURSE PRACTITIONER

## 2023-07-25 ENCOUNTER — PHARMACY VISIT (OUTPATIENT)
Dept: PHARMACY | Facility: MEDICAL CENTER | Age: 28
End: 2023-07-25
Payer: MEDICARE

## 2023-08-01 ENCOUNTER — HOSPITAL ENCOUNTER (OUTPATIENT)
Facility: MEDICAL CENTER | Age: 28
End: 2023-08-01
Payer: COMMERCIAL

## 2023-08-01 ENCOUNTER — OFFICE VISIT (OUTPATIENT)
Dept: URGENT CARE | Facility: CLINIC | Age: 28
End: 2023-08-01
Payer: COMMERCIAL

## 2023-08-01 VITALS
TEMPERATURE: 97.9 F | DIASTOLIC BLOOD PRESSURE: 68 MMHG | BODY MASS INDEX: 31.98 KG/M2 | WEIGHT: 211 LBS | HEIGHT: 68 IN | HEART RATE: 95 BPM | RESPIRATION RATE: 16 BRPM | SYSTOLIC BLOOD PRESSURE: 112 MMHG | OXYGEN SATURATION: 98 %

## 2023-08-01 DIAGNOSIS — R30.0 DYSURIA DURING PREGNANCY IN SECOND TRIMESTER: ICD-10-CM

## 2023-08-01 DIAGNOSIS — O26.892 DYSURIA DURING PREGNANCY IN SECOND TRIMESTER: ICD-10-CM

## 2023-08-01 LAB
APPEARANCE UR: CLEAR
BILIRUB UR STRIP-MCNC: NEGATIVE MG/DL
COLOR UR AUTO: YELLOW
GLUCOSE UR STRIP.AUTO-MCNC: NEGATIVE MG/DL
KETONES UR STRIP.AUTO-MCNC: NEGATIVE MG/DL
LEUKOCYTE ESTERASE UR QL STRIP.AUTO: NEGATIVE
NITRITE UR QL STRIP.AUTO: NEGATIVE
PH UR STRIP.AUTO: 6 [PH] (ref 5–8)
PROT UR QL STRIP: NEGATIVE MG/DL
RBC UR QL AUTO: NEGATIVE
SP GR UR STRIP.AUTO: 1.01
UROBILINOGEN UR STRIP-MCNC: 0.2 MG/DL

## 2023-08-01 PROCEDURE — 99203 OFFICE O/P NEW LOW 30 MIN: CPT | Mod: 25

## 2023-08-01 PROCEDURE — 81002 URINALYSIS NONAUTO W/O SCOPE: CPT

## 2023-08-01 PROCEDURE — 87077 CULTURE AEROBIC IDENTIFY: CPT

## 2023-08-01 PROCEDURE — 3078F DIAST BP <80 MM HG: CPT

## 2023-08-01 PROCEDURE — 3074F SYST BP LT 130 MM HG: CPT

## 2023-08-01 PROCEDURE — 87086 URINE CULTURE/COLONY COUNT: CPT

## 2023-08-01 ASSESSMENT — FIBROSIS 4 INDEX: FIB4 SCORE: 0.69

## 2023-08-02 ASSESSMENT — ENCOUNTER SYMPTOMS
VOMITING: 0
FLANK PAIN: 0
ABDOMINAL PAIN: 0
FEVER: 0
CHILLS: 0
NAUSEA: 0

## 2023-08-02 NOTE — PROGRESS NOTES
Subjective:   Rain Rosado is a 28 y.o. female who presents for Dysuria      HPI: This is a 28-year-old female who presents today with pain with urination.  This is a new problem.  Patient reports developing mild burning with urination last week that went away.  She reports symptoms returned today.  She reports discomfort is very mild.  She has not take anything for symptoms.  Patient is 18.5 weeks pregnant.  She denies pelvic pain, back pain, fevers, chills, body aches.  No other complaints report today.      Review of Systems   Constitutional:  Negative for chills, fever and malaise/fatigue.   Gastrointestinal:  Negative for abdominal pain, nausea and vomiting.   Genitourinary:  Positive for dysuria. Negative for flank pain, frequency, hematuria and urgency.   All other systems reviewed and are negative.      Medications:    Current Outpatient Medications on File Prior to Visit   Medication Sig Dispense Refill    Prenatal MV-Min-Fe Fum-FA-DHA (PRENATAL 1 PO) Take  by mouth.      ibuprofen (MOTRIN) 800 MG Tab Take 1 Tablet by mouth every 8 hours with food as needed for moderate pain. (Patient not taking: Reported on 8/1/2023) 90 Tablet 11    hydrOXYzine HCl (ATARAX) 25 MG Tab Take 1-2 Tablets by mouth 3 times a day as needed for anxiety. (Patient not taking: Reported on 8/1/2023) 90 Tablet 11    escitalopram (LEXAPRO) 10 MG Tab Take 1 Tablet by mouth at bedtime. (Patient not taking: Reported on 8/1/2023) 30 Tablet 11    cyclobenzaprine (FLEXERIL) 5 mg tablet Take 1-2 Tablets by mouth 3 times a day as needed for moderate pain. (Patient not taking: Reported on 8/1/2023) 90 Tablet 5    escitalopram (LEXAPRO) 10 MG Tab Take 1 Tablet by mouth at bedtime. (Patient not taking: Reported on 8/1/2023) 30 Tablet 5    hydrOXYzine HCl (ATARAX) 25 MG Tab Take 1-2 Tablets by mouth 3 times a day as needed for Anxiety. (Patient not taking: Reported on 8/1/2023) 90 Tablet 5    ibuprofen (MOTRIN) 800 MG Tab Take 1 Tablet  "by mouth every 8 hours as needed for Moderate Pain. Take with food (Patient not taking: Reported on 2023) 90 Tablet 5    cyclobenzaprine (FLEXERIL) 5 mg tablet Take 1-2 Tablets by mouth 3 times a day as needed for Moderate Pain. (Patient not taking: Reported on 2023) 90 Tablet 5    naproxen (NAPROSYN) 500 MG Tab Take 1 Tablet by mouth 2 times a day with meals. (Patient not taking: Reported on 2023) 30 Tablet 0     No current facility-administered medications on file prior to visit.        Allergies:   Patient has no known allergies.    Problem List:   Patient Active Problem List   Diagnosis    S/P  section    Acute post-operative pain    Pre-eclampsia in third trimester    Encounter to establish care    Anxiety    Thoracic back pain    Chlamydia contact    Viral warts        Surgical History:  Past Surgical History:   Procedure Laterality Date    REPEAT C SECTION  2019    Procedure:  SECTION, REPEAT;  Surgeon: Corinne E Capurro, M.D.;  Location: LABOR AND DELIVERY;  Service: Labor and Delivery       Past Social Hx:   Social History     Tobacco Use    Smoking status: Never    Smokeless tobacco: Never   Vaping Use    Vaping Use: Never used   Substance Use Topics    Alcohol use: Never    Drug use: Never          Problem list, medications, and allergies reviewed by myself today in Epic.     Objective:     /68 (BP Location: Left arm, Patient Position: Sitting, BP Cuff Size: Adult)   Pulse 95   Temp 36.6 °C (97.9 °F) (Temporal)   Resp 16   Ht 1.727 m (5' 8\")   Wt 95.7 kg (211 lb)   SpO2 98%   BMI 32.08 kg/m²     Physical Exam  Vitals and nursing note reviewed.   Constitutional:       General: She is not in acute distress.     Appearance: Normal appearance. She is normal weight. She is not ill-appearing, toxic-appearing or diaphoretic.   HENT:      Head: Normocephalic and atraumatic.   Cardiovascular:      Rate and Rhythm: Normal rate and regular rhythm.      Pulses: Normal " pulses.      Heart sounds: Normal heart sounds. No murmur heard.     No friction rub. No gallop.   Pulmonary:      Effort: Pulmonary effort is normal. No respiratory distress.      Breath sounds: Normal breath sounds. No stridor. No wheezing, rhonchi or rales.   Chest:      Chest wall: No tenderness.   Abdominal:      Tenderness: There is no right CVA tenderness or left CVA tenderness.   Skin:     General: Skin is warm and dry.      Capillary Refill: Capillary refill takes less than 2 seconds.   Neurological:      General: No focal deficit present.      Mental Status: She is alert and oriented to person, place, and time. Mental status is at baseline.   Psychiatric:         Mood and Affect: Mood normal.         Behavior: Behavior normal.         Thought Content: Thought content normal.         Judgment: Judgment normal.         Assessment/Plan:     Diagnosis and associated orders:   1. Dysuria during pregnancy in second trimester  POCT Urinalysis    URINE CULTURE(NEW)         Results for orders placed or performed in visit on 08/01/23   POCT Urinalysis   Result Value Ref Range    POC Color yellow Negative    POC Appearance clear Negative    POC Glucose negative Negative mg/dL    POC Bilirubin negative Negative mg/dL    POC Ketones negative Negative mg/dL    POC Specific Gravity 1.015 <1.005 - >1.030    POC Blood negative Negative    POC Urine PH 6.0 5.0 - 8.0    POC Protein negative Negative mg/dL    POC Urobiligen 0.2 Negative (0.2) mg/dL    POC Nitrites negative Negative    POC Leukocyte Esterase negative Negative       Comments/MDM:   Pt is clinically stable at today's acute urgent care visit.  No acute distress noted. Appropriate for outpatient management at this time.     Acute problem.  Patient presents today for 1 day of pain with urination.  POC UA is unremarkable.  Patient is 18.5 weeks pregnant.  Urine will be sent for culture, will follow up with results and treat as indicated.  Discussed staying  well-hydrated with patient.  Also discussed possible noninfectious dysuria secondary to pregnancy with patient.  Patient is to return for any worsening signs or symptoms or signs of fail to improve.  Patient is agreeable to plan of care verbalizes good understanding.           Discussed DDx, management options (risks,benefits, and alternatives to planned treatment), natural progression and supportive care.  Expressed understanding and the treatment plan was agreed upon. Questions were encouraged and answered   Return to urgent care prn if new or worsening sx or if there is no improvement in condition prn.    Educated in Red flags and indications to immediately call 911 or present to the Emergency Department.   Advised the patient to follow-up with the primary care physician for recheck, reevaluation, and consideration of further management.    I personally reviewed prior external notes and test results pertinent to today's visit.  I have independently     Please note that this dictation was created using voice recognition software. I have made a reasonable attempt to correct obvious errors, but I expect that there are errors of grammar and possibly content that I did not discover before finalizing the note.    This note was electronically signed by JENNIFER Terrell

## 2023-08-04 DIAGNOSIS — B96.89 BACTERIAL VAGINOSIS: ICD-10-CM

## 2023-08-04 DIAGNOSIS — N76.0 BACTERIAL VAGINOSIS: ICD-10-CM

## 2023-08-04 LAB
BACTERIA UR CULT: ABNORMAL
BACTERIA UR CULT: ABNORMAL
SIGNIFICANT IND 70042: ABNORMAL
SITE SITE: ABNORMAL
SOURCE SOURCE: ABNORMAL

## 2023-08-04 RX ORDER — METRONIDAZOLE 500 MG/1
500 TABLET ORAL 2 TIMES DAILY
Qty: 14 TABLET | Refills: 0 | Status: SHIPPED | OUTPATIENT
Start: 2023-08-04 | End: 2023-08-05

## 2023-08-05 ENCOUNTER — TELEPHONE (OUTPATIENT)
Dept: URGENT CARE | Facility: CLINIC | Age: 28
End: 2023-08-05

## 2023-08-05 ENCOUNTER — PHARMACY VISIT (OUTPATIENT)
Dept: PHARMACY | Facility: MEDICAL CENTER | Age: 28
End: 2023-08-05
Payer: MEDICARE

## 2023-08-05 DIAGNOSIS — B96.89 BACTERIAL VAGINOSIS: ICD-10-CM

## 2023-08-05 DIAGNOSIS — N76.0 BACTERIAL VAGINOSIS: ICD-10-CM

## 2023-08-05 PROCEDURE — RXMED WILLOW AMBULATORY MEDICATION CHARGE

## 2023-08-05 RX ORDER — METRONIDAZOLE 500 MG/1
500 TABLET ORAL 2 TIMES DAILY
Qty: 14 TABLET | Refills: 0 | Status: SHIPPED | OUTPATIENT
Start: 2023-08-05 | End: 2023-08-07 | Stop reason: SDUPTHER

## 2023-08-25 PROCEDURE — RXMED WILLOW AMBULATORY MEDICATION CHARGE: Performed by: NURSE PRACTITIONER

## 2023-08-28 ENCOUNTER — PHARMACY VISIT (OUTPATIENT)
Dept: PHARMACY | Facility: MEDICAL CENTER | Age: 28
End: 2023-08-28
Payer: MEDICARE

## 2023-10-02 RX ORDER — CYCLOBENZAPRINE HCL 5 MG
5-10 TABLET ORAL 3 TIMES DAILY PRN
Qty: 90 TABLET | Refills: 5 | Status: ON HOLD | OUTPATIENT
Start: 2023-10-02 | End: 2023-11-17

## 2023-10-09 ENCOUNTER — HOSPITAL ENCOUNTER (EMERGENCY)
Facility: MEDICAL CENTER | Age: 28
End: 2023-10-09
Attending: OBSTETRICS & GYNECOLOGY | Admitting: OBSTETRICS & GYNECOLOGY
Payer: COMMERCIAL

## 2023-10-09 VITALS
SYSTOLIC BLOOD PRESSURE: 113 MMHG | BODY MASS INDEX: 34.25 KG/M2 | TEMPERATURE: 97.6 F | HEIGHT: 68 IN | WEIGHT: 226 LBS | HEART RATE: 95 BPM | DIASTOLIC BLOOD PRESSURE: 74 MMHG

## 2023-10-09 LAB
APPEARANCE UR: CLEAR
COLOR UR AUTO: YELLOW
FIBRONECTIN FETAL SPEC QL: NEGATIVE
GLUCOSE UR QL STRIP.AUTO: NEGATIVE MG/DL
KETONES UR QL STRIP.AUTO: NEGATIVE MG/DL
LEUKOCYTE ESTERASE UR QL STRIP.AUTO: ABNORMAL
NITRITE UR QL STRIP.AUTO: NEGATIVE
PH UR STRIP.AUTO: 6.5 [PH] (ref 5–8)
PROT UR QL STRIP: NEGATIVE MG/DL
RBC UR QL AUTO: NEGATIVE
SP GR UR STRIP.AUTO: 1.01 (ref 1–1.03)

## 2023-10-09 PROCEDURE — 81002 URINALYSIS NONAUTO W/O SCOPE: CPT

## 2023-10-09 PROCEDURE — 82731 ASSAY OF FETAL FIBRONECTIN: CPT

## 2023-10-09 PROCEDURE — 99282 EMERGENCY DEPT VISIT SF MDM: CPT | Mod: 25

## 2023-10-09 PROCEDURE — 59025 FETAL NON-STRESS TEST: CPT

## 2023-10-09 ASSESSMENT — FIBROSIS 4 INDEX: FIB4 SCORE: 0.69

## 2023-10-09 NOTE — PROGRESS NOTES
EDC            Pt presents to L&D with c/o lower back pain. Pt reports good fetal movement, denies vaginal bleeding, leaking of fluid and contractions/cramping. Pt states that back pain comes and goes and sometimes radiates up her back. No CVA tenderness noted on exam. Pt states that pain is typically 5/10. Report to Dr Musa. Orders received for FFN and SVE.    1200- Dr Musa here to see pt. Order received to discharge pt home.     1209- discharge teaching done with pt, pt verbalized understanding.

## 2023-10-09 NOTE — ED PROVIDER NOTES
DATE OF ADMISSION:  10/09/2023     IDENTIFICATION:  This is a 28-year-old  4, para 3-0-0-3, with an EDC of   2023, EGA 28 and 5/7 weeks who presents with chief complaint of back   pain.     HISTORY OF PRESENT ILLNESS:  This is a patient of Dr. Scott's who has gotten   good prenatal care.  Prenatal care has been uncomplicated.  She presents   today complaining of low back pain.  Denies nausea, vomiting, fever, chills,   change in bowel or bladder habits.  Admits good fetal movement.  Denies   symptoms of PIH.  Here in labor and delivery, fetal heart tracings reactive,   category 1.  She is not laurie.  Her cervix is long and closed.  FFN is   negative.  She has a followup appointment with Dr. Scott next week.     OBSTETRICAL HISTORY:  Significant for 3 prior C-sections.     GYNECOLOGIC HISTORY:  Denies STDs, abnormal Paps.     PAST MEDICAL HISTORY:  ALLERGIES:  None.     CURRENT MEDICATIONS:  Atarax p.r.n. anxiety.     MEDICAL PROBLEMS:  Anxiety.     SURGICAL HISTORY:  Three C-sections.     SOCIAL HISTORY:  Denies alcohol, tobacco, or drug abuse.     FAMILY HISTORY:  Noncontributory.     REVIEW OF SYSTEMS:  Times 12 is negative per AMA standards available in the   chart.     LABORATORY DATA:  As above.     PHYSICAL EXAMINATION:    VITAL SIGNS:  The patient is afebrile.  Vital signs within normal limits.    Fetal heart tracings reactive, category 1.  She is not laurie.  GENERAL:  She is awake, alert, in no apparent distress.  NECK:  Supple.  HEART:  Regular.  CHEST:  Clear.  BREASTS:  Symmetrical.  ABDOMEN:  Soft, pannus gravid, size appropriate for dates, nontender.  EXTREMITIES:  Negative.  BACK:  No CVA tenderness.  GYNECOLOGIC:  As above.     ASSESSMENT:  At this time:  1.  Pregnancy at 28 and 5/7 weeks with low back pain and gravid uterus.  2.  Fetal status reassuring.  3.  No evidence of labor.     PLAN:  At this time:  1.  Discharge home.  2.  Follow up with Dr. Scott next week.  3.   Kick counts,  labor precautions, PPROM precautions given.  She will   come back to labor and delivery with change in her status or fetal status.        ______________________________  MD KERMIT Osborne/GIAN    DD:  10/09/2023 12:04  DT:  10/09/2023 14:22    Job#:  [id number]

## 2023-10-24 ENCOUNTER — HOSPITAL ENCOUNTER (OUTPATIENT)
Dept: LAB | Facility: MEDICAL CENTER | Age: 28
End: 2023-10-24
Attending: INTERNAL MEDICINE
Payer: COMMERCIAL

## 2023-10-24 LAB
GLUCOSE 1H P CHAL SERPL-MCNC: 156 MG/DL (ref 65–180)
GLUCOSE 2H P CHAL SERPL-MCNC: 148 MG/DL (ref 65–155)
GLUCOSE 3H P CHAL SERPL-MCNC: 84 MG/DL (ref 65–140)
GLUCOSE BS SERPL-MCNC: 81 MG/DL (ref 65–95)

## 2023-10-24 PROCEDURE — 36415 COLL VENOUS BLD VENIPUNCTURE: CPT

## 2023-10-24 PROCEDURE — 82951 GLUCOSE TOLERANCE TEST (GTT): CPT

## 2023-10-24 PROCEDURE — 82952 GTT-ADDED SAMPLES: CPT

## 2023-11-16 ENCOUNTER — HOSPITAL ENCOUNTER (EMERGENCY)
Facility: MEDICAL CENTER | Age: 28
End: 2023-11-16
Attending: OBSTETRICS & GYNECOLOGY | Admitting: OBSTETRICS & GYNECOLOGY
Payer: COMMERCIAL

## 2023-11-16 VITALS
BODY MASS INDEX: 34.71 KG/M2 | SYSTOLIC BLOOD PRESSURE: 113 MMHG | TEMPERATURE: 97.4 F | OXYGEN SATURATION: 96 % | WEIGHT: 229 LBS | RESPIRATION RATE: 18 BRPM | HEIGHT: 68 IN | DIASTOLIC BLOOD PRESSURE: 70 MMHG | HEART RATE: 91 BPM

## 2023-11-16 PROCEDURE — 99284 EMERGENCY DEPT VISIT MOD MDM: CPT

## 2023-11-16 PROCEDURE — 59025 FETAL NON-STRESS TEST: CPT

## 2023-11-16 PROCEDURE — A9270 NON-COVERED ITEM OR SERVICE: HCPCS | Mod: UD | Performed by: OBSTETRICS & GYNECOLOGY

## 2023-11-16 PROCEDURE — 700102 HCHG RX REV CODE 250 W/ 637 OVERRIDE(OP): Mod: UD | Performed by: OBSTETRICS & GYNECOLOGY

## 2023-11-16 RX ORDER — DIPHENHYDRAMINE HCL 25 MG
50 TABLET ORAL ONCE
Status: COMPLETED | OUTPATIENT
Start: 2023-11-16 | End: 2023-11-16

## 2023-11-16 RX ADMIN — ACETAMINOPHEN, ASPIRIN, CAFFEINE 2 TABLET: 250; 65; 250 TABLET, FILM COATED ORAL at 21:24

## 2023-11-16 RX ADMIN — DIPHENHYDRAMINE HYDROCHLORIDE 50 MG: 25 TABLET ORAL at 21:24

## 2023-11-16 ASSESSMENT — FIBROSIS 4 INDEX: FIB4 SCORE: 0.69

## 2023-11-17 ENCOUNTER — HOSPITAL ENCOUNTER (EMERGENCY)
Facility: MEDICAL CENTER | Age: 28
End: 2023-11-17
Attending: OBSTETRICS & GYNECOLOGY | Admitting: OBSTETRICS & GYNECOLOGY
Payer: COMMERCIAL

## 2023-11-17 VITALS
BODY MASS INDEX: 34.71 KG/M2 | TEMPERATURE: 96.9 F | HEART RATE: 108 BPM | HEIGHT: 68 IN | OXYGEN SATURATION: 97 % | SYSTOLIC BLOOD PRESSURE: 129 MMHG | DIASTOLIC BLOOD PRESSURE: 75 MMHG | WEIGHT: 229 LBS | RESPIRATION RATE: 16 BRPM

## 2023-11-17 DIAGNOSIS — O99.891 BACK PAIN AFFECTING PREGNANCY IN THIRD TRIMESTER: ICD-10-CM

## 2023-11-17 DIAGNOSIS — M54.9 BACK PAIN AFFECTING PREGNANCY IN THIRD TRIMESTER: ICD-10-CM

## 2023-11-17 LAB
ALBUMIN SERPL BCP-MCNC: 3.2 G/DL (ref 3.2–4.9)
ALBUMIN/GLOB SERPL: 0.9 G/DL
ALP SERPL-CCNC: 111 U/L (ref 30–99)
ALT SERPL-CCNC: 13 U/L (ref 2–50)
ANION GAP SERPL CALC-SCNC: 12 MMOL/L (ref 7–16)
APPEARANCE UR: CLEAR
AST SERPL-CCNC: 22 U/L (ref 12–45)
BASOPHILS # BLD AUTO: 0.2 % (ref 0–1.8)
BASOPHILS # BLD: 0.01 K/UL (ref 0–0.12)
BILIRUB SERPL-MCNC: 0.2 MG/DL (ref 0.1–1.5)
BUN SERPL-MCNC: 6 MG/DL (ref 8–22)
CALCIUM ALBUM COR SERPL-MCNC: 9.1 MG/DL (ref 8.5–10.5)
CALCIUM SERPL-MCNC: 8.5 MG/DL (ref 8.5–10.5)
CHLORIDE SERPL-SCNC: 104 MMOL/L (ref 96–112)
CO2 SERPL-SCNC: 19 MMOL/L (ref 20–33)
COLOR UR AUTO: YELLOW
CREAT SERPL-MCNC: 0.47 MG/DL (ref 0.5–1.4)
CREAT UR-MCNC: 37.02 MG/DL
EOSINOPHIL # BLD AUTO: 0 K/UL (ref 0–0.51)
EOSINOPHIL NFR BLD: 0 % (ref 0–6.9)
ERYTHROCYTE [DISTWIDTH] IN BLOOD BY AUTOMATED COUNT: 40.3 FL (ref 35.9–50)
GFR SERPLBLD CREATININE-BSD FMLA CKD-EPI: 133 ML/MIN/1.73 M 2
GLOBULIN SER CALC-MCNC: 3.6 G/DL (ref 1.9–3.5)
GLUCOSE SERPL-MCNC: 86 MG/DL (ref 65–99)
GLUCOSE UR QL STRIP.AUTO: NEGATIVE MG/DL
HCT VFR BLD AUTO: 32.5 % (ref 37–47)
HGB BLD-MCNC: 10.7 G/DL (ref 12–16)
IMM GRANULOCYTES # BLD AUTO: 0.02 K/UL (ref 0–0.11)
IMM GRANULOCYTES NFR BLD AUTO: 0.4 % (ref 0–0.9)
KETONES UR QL STRIP.AUTO: NEGATIVE MG/DL
LEUKOCYTE ESTERASE UR QL STRIP.AUTO: ABNORMAL
LYMPHOCYTES # BLD AUTO: 0.72 K/UL (ref 1–4.8)
LYMPHOCYTES NFR BLD: 13.9 % (ref 22–41)
MCH RBC QN AUTO: 27.2 PG (ref 27–33)
MCHC RBC AUTO-ENTMCNC: 32.9 G/DL (ref 32.2–35.5)
MCV RBC AUTO: 82.7 FL (ref 81.4–97.8)
MONOCYTES # BLD AUTO: 0.33 K/UL (ref 0–0.85)
MONOCYTES NFR BLD AUTO: 6.4 % (ref 0–13.4)
NEUTROPHILS # BLD AUTO: 4.09 K/UL (ref 1.82–7.42)
NEUTROPHILS NFR BLD: 79.1 % (ref 44–72)
NITRITE UR QL STRIP.AUTO: NEGATIVE
NRBC # BLD AUTO: 0 K/UL
NRBC BLD-RTO: 0 /100 WBC (ref 0–0.2)
PH UR STRIP.AUTO: 7 [PH] (ref 5–8)
PLATELET # BLD AUTO: 211 K/UL (ref 164–446)
PMV BLD AUTO: 11.2 FL (ref 9–12.9)
POTASSIUM SERPL-SCNC: 3.5 MMOL/L (ref 3.6–5.5)
PROT SERPL-MCNC: 6.8 G/DL (ref 6–8.2)
PROT UR QL STRIP: NEGATIVE MG/DL
PROT UR-MCNC: 5 MG/DL (ref 0–15)
PROT/CREAT UR: 135 MG/G (ref 10–107)
RBC # BLD AUTO: 3.93 M/UL (ref 4.2–5.4)
RBC UR QL AUTO: NEGATIVE
SODIUM SERPL-SCNC: 135 MMOL/L (ref 135–145)
SP GR UR STRIP.AUTO: 1.01 (ref 1–1.03)
WBC # BLD AUTO: 5.2 K/UL (ref 4.8–10.8)

## 2023-11-17 PROCEDURE — 36415 COLL VENOUS BLD VENIPUNCTURE: CPT

## 2023-11-17 PROCEDURE — 85025 COMPLETE CBC W/AUTO DIFF WBC: CPT

## 2023-11-17 PROCEDURE — 59025 FETAL NON-STRESS TEST: CPT

## 2023-11-17 PROCEDURE — A9270 NON-COVERED ITEM OR SERVICE: HCPCS | Mod: UD | Performed by: OBSTETRICS & GYNECOLOGY

## 2023-11-17 PROCEDURE — 99284 EMERGENCY DEPT VISIT MOD MDM: CPT

## 2023-11-17 PROCEDURE — 700105 HCHG RX REV CODE 258: Mod: UD | Performed by: OBSTETRICS & GYNECOLOGY

## 2023-11-17 PROCEDURE — 700102 HCHG RX REV CODE 250 W/ 637 OVERRIDE(OP): Mod: UD | Performed by: OBSTETRICS & GYNECOLOGY

## 2023-11-17 PROCEDURE — 81002 URINALYSIS NONAUTO W/O SCOPE: CPT

## 2023-11-17 PROCEDURE — 82570 ASSAY OF URINE CREATININE: CPT

## 2023-11-17 PROCEDURE — 80053 COMPREHEN METABOLIC PANEL: CPT

## 2023-11-17 PROCEDURE — 84156 ASSAY OF PROTEIN URINE: CPT

## 2023-11-17 RX ORDER — BUTALBITAL, ACETAMINOPHEN AND CAFFEINE 50; 325; 40 MG/1; MG/1; MG/1
1 TABLET ORAL ONCE
Status: COMPLETED | OUTPATIENT
Start: 2023-11-17 | End: 2023-11-17

## 2023-11-17 RX ORDER — BUTALBITAL, ACETAMINOPHEN AND CAFFEINE 50; 325; 40 MG/1; MG/1; MG/1
1 TABLET ORAL EVERY 4 HOURS PRN
Qty: 10 TABLET | Refills: 0 | Status: SHIPPED | OUTPATIENT
Start: 2023-11-17 | End: 2023-11-20

## 2023-11-17 RX ORDER — SODIUM CHLORIDE, SODIUM LACTATE, POTASSIUM CHLORIDE, AND CALCIUM CHLORIDE .6; .31; .03; .02 G/100ML; G/100ML; G/100ML; G/100ML
1000 INJECTION, SOLUTION INTRAVENOUS ONCE
Status: COMPLETED | OUTPATIENT
Start: 2023-11-17 | End: 2023-11-17

## 2023-11-17 RX ADMIN — BUTALBITAL, ACETAMINOPHEN AND CAFFEINE 1 TABLET: 325; 50; 40 TABLET ORAL at 09:22

## 2023-11-17 RX ADMIN — SODIUM CHLORIDE, POTASSIUM CHLORIDE, SODIUM LACTATE AND CALCIUM CHLORIDE 1000 ML: 600; 310; 30; 20 INJECTION, SOLUTION INTRAVENOUS at 09:22

## 2023-11-17 ASSESSMENT — FIBROSIS 4 INDEX: FIB4 SCORE: 0.69

## 2023-11-17 ASSESSMENT — PAIN SCALES - GENERAL: PAINLEVEL: 7

## 2023-11-17 NOTE — ED PROVIDER NOTES
"PNC with [facility], Dr Scott.         OB ED Consult note    CC: my headache is back and now I'm having low back pain    HPI:27yo  at 35w3d BIB her mom for above. She was here last night c/o HA and was given excedrin. Reports HA is now back and also has some low back pain. She did fall on her knees a couple days ago but no other hx of trauma. She has hx of 3 previous c/sections and is scheduled for repeat. She had PreE with her last delivery. Reports good fm, occ ctx and denies leaking of fluid.      OB history: -0-0-3  Reports 3 full-term C-sections, elevated blood pressure at the end of her last pregnancy otherwise denies complications     GYN: denies STIs, cervical procedures     Past medical history: Anxiety  Past surgical history:  x3        Medications: Prenatal vitamin, Tylenol as needed, did take ibuprofen today, hydroxyzine as needed        Family History         Family History   Problem Relation Age of Onset    Diabetes Father      Diabetes Paternal Aunt      Diabetes Maternal Grandmother      Heart Disease Maternal Grandfather              Social history: Denies tobacco, drugs, alcohol        PE:  Vitals:    23 0840 23 0935 23 0955   BP: 131/83 117/71 129/75   Pulse: (!) 129 100 (!) 108   Resp: 16     Temp: 36.1 °C (96.9 °F)     TempSrc: Temporal     SpO2: 97%     Weight: 104 kg (229 lb)     Height: 1.727 m (5' 8\")         gen: AAO, NAD  abd: soft, gravid, NT  Ext: NT, no edema; no hyperreflexia  Neuro: Strength and sensation equal in bilateral upper and lower extremities.  Cranial nerves are grossly intact, pupils equal round reactive to light and accommodation.     Recent Labs     23  0905   WBC 5.2   RBC 3.93*   HEMOGLOBIN 10.7*   HEMATOCRIT 32.5*   MCV 82.7   MCH 27.2   RDW 40.3   PLATELETCT 211   MPV 11.2   NEUTSPOLYS 79.10*   LYMPHOCYTES 13.90*   MONOCYTES 6.40   EOSINOPHILS 0.00   BASOPHILS 0.20      Recent Labs     23  0905   ALBUMIN 3.2 "   TBILIRUBIN 0.2   ALKPHOSPHAT 111*   TOTPROTEIN 6.8   ALTSGPT 13   ASTSGOT 22   CREATININE 0.47*         FHT: 150/moderate variability/positive accels/negative decels  don: No contractions     A/P: 28 y.o.  @ 35w3d with HA and low back pain; hx of 3 previous c/sections  -Headache completely resolved with one dose of Fiorcet.  Normal blood pressures, no signs of preeclampsia. Rx sent for fiorcet #10 to be taken prn.  NST: Reactive and reassuring as above   -d/w relief measures for low back pain  Discharged home with PreE and  labor precautions, follow-up in clinic as scheduled for routine OB visit

## 2023-11-17 NOTE — PROGRESS NOTES
0837: Pt is a  at 35.3 weeks today, pt presents to L&D with c/o 10/10 back & abd pain and 7/10 HA. Pt reports +FM, denies VB/LOF. Pt also denies vision changes/RUQ pain. EFM and TOCO applied, VS taken, pt comfortable in bed at this time.     0850: This RN telephoned Dr Jolley, updated on pt status/VS/FHT. POC discussed, orders received, see orders for details.     1100: Dr Jolley at bedside POC discussed, discharge orders received, see orders for details.     1120:  Discharge instructions given including  labor precautions, UC's, ROM, VB, abn FM, when to return to L&D, f/u with Dr. Scott, pelvic rest and modified bedrest. Questions answered at length. Pt verbalized understanding and agreement with POC and discharge. Discharge instructions signed.

## 2023-11-17 NOTE — ED PROVIDER NOTES
OB ED eval    CC: Headache    HPI:  Ms. Rain Rosado is a 28 y.o.  @ 35w2d with headache.  Reports that her headache started this morning.  Was initially 10 out of 10, last took Tylenol 1 g around 3 PM and it brought it down to a 7 out of 10.  Initially when asked reports that she does not really get headaches outside of pregnancy but gets in with pregnancy however then reports that she has not really had headaches in this pregnancy until now.  The headache is described as by frontal.  She reports that it comes and goes.  Denies any numbness, weakness, vision changes.  Reports that she had elevated blood pressures at the end of her last pregnancy.  Has not had any elevated blood pressures during this pregnancy.  Also tried ibuprofen earlier.  Does report that she is staying hydrated and eating.  Nothing in particular makes the headache better or worse that she can tell.  Reports that she did hit fall on her knee yesterday.  Reports positive fetal movement, denies contractions, loss of fluid, vaginal bleeding.    PNC with [facility], Dr Scott.         ROS:  Const: denies fevers, general concerns  CV/resp: reports no concerns  GI: denies abd pain, GI concerns  : see HPI  Neuro: denies HA/vision changes    OB history: -0-0-3  Reports 3 full-term C-sections, elevated blood pressure at the end of her last pregnancy otherwise denies complications    GYN: denies STIs, cervical procedures    Past medical history: Anxiety  Past surgical history:  x3      Medications: Prenatal vitamin, Tylenol as needed, did take ibuprofen today, hydroxyzine as needed      Family History   Problem Relation Age of Onset    Diabetes Father     Diabetes Paternal Aunt     Diabetes Maternal Grandmother     Heart Disease Maternal Grandfather        Social history: Denies tobacco, drugs, alcohol      PE:  Vitals:    23   BP: 128/82    Pulse: (!) 102 91   Resp: 18    Temp: 36.3 °C  "(97.4 °F)    TempSrc: Temporal    SpO2:  96%   Weight: 104 kg (229 lb)    Height: 1.727 m (5' 8\")      gen: AAO, NAD  abd: soft, gravid, NT  Ext: NT, no edema; no hyperreflexia  Neuro: Strength and sensation equal in bilateral upper and lower extremities.  Cranial nerves are grossly intact, pupils equal round reactive to light and accommodation.      FHT: 150/moderate variability/positive accels/negative decels  don: No contractions    A/P: 28 y.o.  @ 35w2d with HA  -Headache completely resolved with Excedrin.  Normal blood pressures, no signs of preeclampsia.  NST: Reactive and reassuring as above    Discharged home with PreE and  labor precautions, follow-up in clinic as scheduled for routine OB visit    Audelia Reinoso MD  [facility]        "

## 2023-11-17 NOTE — PROGRESS NOTES
28 y.o  EDC 23 (35w2d)    Pt arrives to Labor and delivery with c/o headache that comes and goes, and is unrelieved with tylenol 1,000 mg at 1500 and ibuprofen 800 mg at 1930. Pt states at the moment, pain is a 7/10 located at her temples and at its worst a 10/10. Pt denies vision changes, epigastric pain, or swelling in extremities. Monitors applied x2. VSS, BP cycled q20 min. Pt states +FM, denies LOF/VB/ctx.  Arleth SIBLEY updated on pt status. Order received to give Excedrin and benadryl as ordered (See MAR).     Pt states she no longer has a headache post RX administration. Arleth SIBLEY updated. Order received to D/C pt home.      labor precautions reviewed, pt verbalizes understanding. All questions and concerns addressed at this time. Pt states she feels comfortable going home at this time. Pt ambulated off unit with belongings in hand in stable condition.

## 2023-12-01 ENCOUNTER — HOSPITAL ENCOUNTER (OUTPATIENT)
Dept: LAB | Facility: MEDICAL CENTER | Age: 28
End: 2023-12-01
Attending: NURSE PRACTITIONER
Payer: COMMERCIAL

## 2023-12-01 LAB
ALBUMIN SERPL BCP-MCNC: 3.1 G/DL (ref 3.2–4.9)
ALBUMIN/GLOB SERPL: 0.9 G/DL
ALP SERPL-CCNC: 117 U/L (ref 30–99)
ALT SERPL-CCNC: 10 U/L (ref 2–50)
ANION GAP SERPL CALC-SCNC: 9 MMOL/L (ref 7–16)
AST SERPL-CCNC: 12 U/L (ref 12–45)
BASOPHILS # BLD AUTO: 0.3 % (ref 0–1.8)
BASOPHILS # BLD: 0.02 K/UL (ref 0–0.12)
BILIRUB SERPL-MCNC: 0.2 MG/DL (ref 0.1–1.5)
BUN SERPL-MCNC: 7 MG/DL (ref 8–22)
CALCIUM ALBUM COR SERPL-MCNC: 9.1 MG/DL (ref 8.5–10.5)
CALCIUM SERPL-MCNC: 8.4 MG/DL (ref 8.5–10.5)
CHLORIDE SERPL-SCNC: 105 MMOL/L (ref 96–112)
CO2 SERPL-SCNC: 21 MMOL/L (ref 20–33)
CREAT SERPL-MCNC: 0.54 MG/DL (ref 0.5–1.4)
EOSINOPHIL # BLD AUTO: 0.03 K/UL (ref 0–0.51)
EOSINOPHIL NFR BLD: 0.5 % (ref 0–6.9)
ERYTHROCYTE [DISTWIDTH] IN BLOOD BY AUTOMATED COUNT: 41.6 FL (ref 35.9–50)
GFR SERPLBLD CREATININE-BSD FMLA CKD-EPI: 129 ML/MIN/1.73 M 2
GLOBULIN SER CALC-MCNC: 3.5 G/DL (ref 1.9–3.5)
GLUCOSE SERPL-MCNC: 87 MG/DL (ref 65–99)
HCT VFR BLD AUTO: 34.7 % (ref 37–47)
HGB BLD-MCNC: 11.2 G/DL (ref 12–16)
IMM GRANULOCYTES # BLD AUTO: 0.02 K/UL (ref 0–0.11)
IMM GRANULOCYTES NFR BLD AUTO: 0.3 % (ref 0–0.9)
LYMPHOCYTES # BLD AUTO: 1.96 K/UL (ref 1–4.8)
LYMPHOCYTES NFR BLD: 30 % (ref 22–41)
MCH RBC QN AUTO: 26.9 PG (ref 27–33)
MCHC RBC AUTO-ENTMCNC: 32.3 G/DL (ref 32.2–35.5)
MCV RBC AUTO: 83.4 FL (ref 81.4–97.8)
MONOCYTES # BLD AUTO: 0.35 K/UL (ref 0–0.85)
MONOCYTES NFR BLD AUTO: 5.4 % (ref 0–13.4)
NEUTROPHILS # BLD AUTO: 4.15 K/UL (ref 1.82–7.42)
NEUTROPHILS NFR BLD: 63.5 % (ref 44–72)
NRBC # BLD AUTO: 0 K/UL
NRBC BLD-RTO: 0 /100 WBC (ref 0–0.2)
PLATELET # BLD AUTO: 260 K/UL (ref 164–446)
PMV BLD AUTO: 11.6 FL (ref 9–12.9)
POTASSIUM SERPL-SCNC: 3.9 MMOL/L (ref 3.6–5.5)
PROT SERPL-MCNC: 6.6 G/DL (ref 6–8.2)
RBC # BLD AUTO: 4.16 M/UL (ref 4.2–5.4)
SODIUM SERPL-SCNC: 135 MMOL/L (ref 135–145)
WBC # BLD AUTO: 6.5 K/UL (ref 4.8–10.8)

## 2023-12-01 PROCEDURE — 85025 COMPLETE CBC W/AUTO DIFF WBC: CPT

## 2023-12-01 PROCEDURE — 80053 COMPREHEN METABOLIC PANEL: CPT

## 2023-12-01 PROCEDURE — 36415 COLL VENOUS BLD VENIPUNCTURE: CPT

## 2023-12-01 PROCEDURE — 82570 ASSAY OF URINE CREATININE: CPT

## 2023-12-01 PROCEDURE — 84156 ASSAY OF PROTEIN URINE: CPT

## 2023-12-02 LAB
CREAT UR-MCNC: 93.04 MG/DL
PROT UR-MCNC: 12 MG/DL (ref 0–15)
PROT/CREAT UR: 129 MG/G (ref 10–107)

## 2023-12-03 ENCOUNTER — HOSPITAL ENCOUNTER (EMERGENCY)
Facility: MEDICAL CENTER | Age: 28
End: 2023-12-03
Attending: OBSTETRICS & GYNECOLOGY | Admitting: OBSTETRICS & GYNECOLOGY
Payer: COMMERCIAL

## 2023-12-03 VITALS
HEART RATE: 90 BPM | RESPIRATION RATE: 15 BRPM | DIASTOLIC BLOOD PRESSURE: 79 MMHG | BODY MASS INDEX: 35.46 KG/M2 | HEIGHT: 68 IN | SYSTOLIC BLOOD PRESSURE: 123 MMHG | WEIGHT: 234 LBS | TEMPERATURE: 97 F | OXYGEN SATURATION: 98 %

## 2023-12-03 LAB
ALBUMIN SERPL BCP-MCNC: 3.1 G/DL (ref 3.2–4.9)
ALBUMIN/GLOB SERPL: 0.9 G/DL
ALP SERPL-CCNC: 113 U/L (ref 30–99)
ALT SERPL-CCNC: 11 U/L (ref 2–50)
ANION GAP SERPL CALC-SCNC: 12 MMOL/L (ref 7–16)
AST SERPL-CCNC: 12 U/L (ref 12–45)
BILIRUB SERPL-MCNC: 0.2 MG/DL (ref 0.1–1.5)
BUN SERPL-MCNC: 8 MG/DL (ref 8–22)
CALCIUM ALBUM COR SERPL-MCNC: 9 MG/DL (ref 8.5–10.5)
CALCIUM SERPL-MCNC: 8.3 MG/DL (ref 8.5–10.5)
CHLORIDE SERPL-SCNC: 106 MMOL/L (ref 96–112)
CO2 SERPL-SCNC: 18 MMOL/L (ref 20–33)
CREAT SERPL-MCNC: 0.5 MG/DL (ref 0.5–1.4)
CREAT UR-MCNC: 175.29 MG/DL
ERYTHROCYTE [DISTWIDTH] IN BLOOD BY AUTOMATED COUNT: 40.4 FL (ref 35.9–50)
GFR SERPLBLD CREATININE-BSD FMLA CKD-EPI: 131 ML/MIN/1.73 M 2
GLOBULIN SER CALC-MCNC: 3.5 G/DL (ref 1.9–3.5)
GLUCOSE SERPL-MCNC: 92 MG/DL (ref 65–99)
HCT VFR BLD AUTO: 32.4 % (ref 37–47)
HGB BLD-MCNC: 10.8 G/DL (ref 12–16)
MCH RBC QN AUTO: 27.3 PG (ref 27–33)
MCHC RBC AUTO-ENTMCNC: 33.3 G/DL (ref 32.2–35.5)
MCV RBC AUTO: 82 FL (ref 81.4–97.8)
PLATELET # BLD AUTO: 242 K/UL (ref 164–446)
PMV BLD AUTO: 10.7 FL (ref 9–12.9)
POTASSIUM SERPL-SCNC: 3.8 MMOL/L (ref 3.6–5.5)
PROT SERPL-MCNC: 6.6 G/DL (ref 6–8.2)
PROT UR-MCNC: 17 MG/DL (ref 0–15)
PROT/CREAT UR: 97 MG/G (ref 10–107)
RBC # BLD AUTO: 3.95 M/UL (ref 4.2–5.4)
SODIUM SERPL-SCNC: 136 MMOL/L (ref 135–145)
WBC # BLD AUTO: 6.7 K/UL (ref 4.8–10.8)

## 2023-12-03 PROCEDURE — 302449 STATCHG TRIAGE ONLY (STATISTIC)

## 2023-12-03 PROCEDURE — 36415 COLL VENOUS BLD VENIPUNCTURE: CPT

## 2023-12-03 PROCEDURE — 59025 FETAL NON-STRESS TEST: CPT

## 2023-12-03 PROCEDURE — 80053 COMPREHEN METABOLIC PANEL: CPT

## 2023-12-03 PROCEDURE — 85027 COMPLETE CBC AUTOMATED: CPT

## 2023-12-03 PROCEDURE — 84156 ASSAY OF PROTEIN URINE: CPT

## 2023-12-03 PROCEDURE — 82570 ASSAY OF URINE CREATININE: CPT

## 2023-12-03 RX ORDER — HYDROXYZINE HYDROCHLORIDE 10 MG/1
10 TABLET, FILM COATED ORAL 3 TIMES DAILY PRN
Status: ON HOLD | COMMUNITY
End: 2023-12-22

## 2023-12-03 ASSESSMENT — FIBROSIS 4 INDEX: FIB4 SCORE: 0.39

## 2023-12-03 ASSESSMENT — PAIN SCALES - GENERAL: PAINLEVEL: 0 - NO PAIN

## 2023-12-03 NOTE — PROGRESS NOTES
1239 27 y/o  EDC 23, EGA 36.4, here to L&D with c/o dizziness that started at 0900 this morning and is worse when she is walking. States that this pregnancy is complicated by pre-eclampsia, but admission BP's 119-126 systolic. EFM/TOCO applied, pt states positive FM. Denies vaginal LOF or bleeding. Patient not dizzy while lying in bed right now    1313 Dr. Frias given report    1329 New orders received from Dr. Frias    1430 Per MD discharge patient    1445 Discharge instructions complete, all patient questions answered at this time

## 2023-12-04 NOTE — ED TRIAGE NOTES
OB ED Triage Note    Date: 12/3/2023    Patient ID: 28-year-old  4 para 3-0-0-3 at 36+4 weeks by last menstrual period ([facility] 2023)     Chief complaint: Dizziness.    History of present illness: The patient reports that she had dizziness this morning.  She presented to labor and delivery due to dizziness and her symptoms resolved.  She states she also has a history of preeclampsia.  She denies headaches or changes in vision.  She endorses positive fetal movement.  She denies vaginal bleeding or loss of fluid.  She has no other concerns at this time.  She reports that she is scheduled for repeat  with bilateral salpingectomy at 38+6 weeks due to preeclampsia.    Review of systems: Denies fevers, chills, shortness of breath/chest pain, nausea/vomiting, diarrhea or dysuria.    Physical exam:  Vital signs:  General: Alert, conversational, pleasant, no acute distress  Cardiovascular: Regular rate  Pulmonary: Respiratory distress, symmetric expansion  Abdomen: Soft, nontender, nondistended, gravid  Genitourinary: Deferred  Extremities: Moves all, no edema    NST: Baseline 140s, moderate variability, positive accelerations, no decelerations, tocometer quiet.    Laboratory studies: CMP normal, creatinine 0.5, AST 12, ALT 11, protein creatinine ratio 97, hemoglobin 10.8, platelets 241     Assessment/plan:   28-year-old  4 para 3-0-0-3 at 36+4 weeks by last menstrual period (EDC 2023)   1.   intrauterine pregnancy at 36+4 weeks.  2.  Dizziness, resolved.  3.  Elevated blood pressures.    Discussion: The patient reports that she has a history of preeclampsia in this pregnancy.  Per chart review did not see any evidence of preeclampsia.  She had elevated blood pressures at her last visit.  Preeclampsia labs were drawn today and they were all normal.  She had blood pressures in the 110s to 120s over 60s 80s while in triage.  She has no signs or symptoms of preeclampsia.  I let her know  that I would reach out to Dr. Scott to confirm her delivery date.    Plan:   1.  Okay to discharge home.  2.  labor precautions.  3.  Preeclampsia precautions.    Lexi Frias MD

## 2023-12-12 ENCOUNTER — HOSPITAL ENCOUNTER (EMERGENCY)
Facility: MEDICAL CENTER | Age: 28
End: 2023-12-12
Attending: OBSTETRICS & GYNECOLOGY | Admitting: OBSTETRICS & GYNECOLOGY
Payer: COMMERCIAL

## 2023-12-12 VITALS
HEART RATE: 81 BPM | BODY MASS INDEX: 35.61 KG/M2 | WEIGHT: 235 LBS | RESPIRATION RATE: 16 BRPM | TEMPERATURE: 96.1 F | DIASTOLIC BLOOD PRESSURE: 75 MMHG | SYSTOLIC BLOOD PRESSURE: 122 MMHG | HEIGHT: 68 IN

## 2023-12-12 PROCEDURE — 302449 STATCHG TRIAGE ONLY (STATISTIC)

## 2023-12-12 PROCEDURE — 59025 FETAL NON-STRESS TEST: CPT

## 2023-12-12 ASSESSMENT — PAIN SCALES - GENERAL: PAINLEVEL: 0 - NO PAIN

## 2023-12-12 ASSESSMENT — FIBROSIS 4 INDEX: FIB4 SCORE: 0.4

## 2023-12-12 NOTE — PROGRESS NOTES
010- Patient arrived to triage reporting contractions. Patient states the contractions started on  and have increased in frquency and intensity today. Patient is a  with an ADA of 23 making the patient 37.6 weeks pregnant. Patient denies LOF or VB and confirms good FM. External toco and FHM applied to patient. Vitals obtained. MFTI filed. Phone call to Dr. Ramirez. Relayed SVE findings. Plan is to re-check cervix in an hour.     201- Phone call to Dr. Ramirez. Relayed recent SVE findings. Received discharge orders.     204- Discharge instructions discussed, patient verbalized understanding. UC's, ROM, VB, abn FM, when to return to L&D discussed.  Patient escorted off unit without any complications.

## 2023-12-15 ENCOUNTER — HOSPITAL ENCOUNTER (EMERGENCY)
Facility: MEDICAL CENTER | Age: 28
End: 2023-12-16
Attending: OBSTETRICS & GYNECOLOGY | Admitting: OBSTETRICS & GYNECOLOGY
Payer: COMMERCIAL

## 2023-12-15 LAB
APPEARANCE UR: CLEAR
COLOR UR AUTO: YELLOW
GLUCOSE UR QL STRIP.AUTO: NEGATIVE MG/DL
KETONES UR QL STRIP.AUTO: NEGATIVE MG/DL
LEUKOCYTE ESTERASE UR QL STRIP.AUTO: NEGATIVE
NITRITE UR QL STRIP.AUTO: NEGATIVE
PH UR STRIP.AUTO: 7 [PH] (ref 5–8)
PROT UR QL STRIP: NEGATIVE MG/DL
RBC UR QL AUTO: NEGATIVE
SP GR UR STRIP.AUTO: 1.01 (ref 1–1.03)

## 2023-12-15 PROCEDURE — 81002 URINALYSIS NONAUTO W/O SCOPE: CPT

## 2023-12-15 PROCEDURE — 99282 EMERGENCY DEPT VISIT SF MDM: CPT | Mod: 25

## 2023-12-15 PROCEDURE — 59025 FETAL NON-STRESS TEST: CPT

## 2023-12-15 ASSESSMENT — FIBROSIS 4 INDEX: FIB4 SCORE: 0.4

## 2023-12-16 VITALS
HEIGHT: 68 IN | BODY MASS INDEX: 34.86 KG/M2 | RESPIRATION RATE: 16 BRPM | WEIGHT: 230 LBS | DIASTOLIC BLOOD PRESSURE: 76 MMHG | SYSTOLIC BLOOD PRESSURE: 125 MMHG | HEART RATE: 107 BPM | TEMPERATURE: 97.3 F

## 2023-12-16 NOTE — ED PROVIDER NOTES
DATE OF SERVICE:  2023     HISTORY OF PRESENT ILLNESS:  The patient is a 28-year-old  who presents   to labor and delivery at 38 and 3/7th weeks' gestation, EDC of 2023.    She has a previous  x3 and will be for repeat with salpingectomy on   this upcoming Wednesday.  She reports that she has had a fairly uneventful   prenatal course and this evening, she started getting some sharp stabbing pain   in 1 particular spot near her prior  scar on the left lower   quadrant.  She has no nausea, no vomiting, no diarrhea.  She has no fever.    She is not laurie.  Still leaking fluid, no vaginal bleeding.  No back   pain or flank pain and the baby is moving well.  She did try to take some   Tylenol, but it did not help.     PAST MEDICAL HISTORY:  Noncontributory.     PAST SURGICAL HISTORY:  Previous  x3.     OBSTETRICAL HISTORY:  .     GYNECOLOGIC HISTORY:  Last menstrual period 2023.  Denies history of   abnormal Paps or STDs.     FAMILY HISTORY:  Diabetes.     SOCIAL HISTORY:  Denies any alcohol, tobacco or drug use.     PHYSICAL EXAMINATION:    VITAL SIGNS:  Stable.  She is afebrile.  GENERAL:  Awake, alert, oriented.  She is in no acute distress.  CARDIOVASCULAR:  Regular rate and rhythm.  CHEST:  Clear to auscultation bilaterally.  ABDOMEN:  Soft, nontender, nondistended, normal bowel sounds.  No peritoneal   signs.  No pain with deep palpation.  No CVA tenderness. However, she does   point to 1 pinpoint spot that is not reproducible by deep palpation that she   has discomfort on.  Contractions none.  Fetal heart rate tracing category 1,   reactive, moderate variability, no decelerations.  PELVIC:  Her sterile vaginal exam deferred.     ASSESSMENT AND PLAN:  A 28-year-old female  at 38 and 3/7th weeks'   gestation.  Some pinpoint pain in 1 particular spot in her abdomen, but she   has no peritoneal signs, no signs of an acute abdomen and she is not    laurie.  The patient seems very comfortable in triage.  Advised her that   I am not entirely sure what her pain is, but it may be from stretching,   pulling pain as the baby gets bigger or lower preparing for birth.  Advised   her she can use ice or heat pack if needed.  She can take Tylenol, but suspect   it will not help much.  However, with the baby looking as good as it does and   no contractions and no evidence of  labor, we will plan to discharge   her home.  She has followup already scheduled in the office and is scheduled   for her repeat .        ______________________________  MD PRAFUL ALCANTARAP/SUB/    DD:  2023 01:23  DT:  2023 03:12    Job#:  [id number]

## 2023-12-16 NOTE — PROGRESS NOTES
2228: Pt presents to triage for pain in her low back, pelvic area, intermittent sharp p[ain to L lower quadrant.     Pt states she is is unsure if this pain is contractions, no leaking of fluid, no vaginal bleeding, and pt states fetal movement is normal.    2310: SVE by this RN     2313: Report called to Dr. Herron    0000: Dr. Herron at bedside- discharge orders received     0018: Pt given AVS. This RN provided term labor precaution education. Pt states understanding and has no further questions at this time. Pt ambulated off unit independently.

## 2023-12-18 ENCOUNTER — APPOINTMENT (OUTPATIENT)
Dept: ADMISSIONS | Facility: MEDICAL CENTER | Age: 28
End: 2023-12-18
Attending: OBSTETRICS & GYNECOLOGY
Payer: COMMERCIAL

## 2023-12-19 ENCOUNTER — ANESTHESIA EVENT (OUTPATIENT)
Dept: OBGYN | Facility: MEDICAL CENTER | Age: 28
End: 2023-12-19
Payer: COMMERCIAL

## 2023-12-20 ENCOUNTER — ANESTHESIA (OUTPATIENT)
Dept: OBGYN | Facility: MEDICAL CENTER | Age: 28
End: 2023-12-20
Payer: COMMERCIAL

## 2023-12-20 ENCOUNTER — HOSPITAL ENCOUNTER (INPATIENT)
Facility: MEDICAL CENTER | Age: 28
LOS: 2 days | End: 2023-12-22
Attending: OBSTETRICS & GYNECOLOGY | Admitting: OBSTETRICS & GYNECOLOGY
Payer: COMMERCIAL

## 2023-12-20 DIAGNOSIS — Z98.891 S/P CESAREAN SECTION: ICD-10-CM

## 2023-12-20 LAB
ALBUMIN SERPL BCP-MCNC: 3.3 G/DL (ref 3.2–4.9)
ALBUMIN/GLOB SERPL: 1 G/DL
ALP SERPL-CCNC: 129 U/L (ref 30–99)
ALT SERPL-CCNC: 13 U/L (ref 2–50)
ANION GAP SERPL CALC-SCNC: 11 MMOL/L (ref 7–16)
AST SERPL-CCNC: 17 U/L (ref 12–45)
BASOPHILS # BLD AUTO: 0.3 % (ref 0–1.8)
BASOPHILS # BLD: 0.02 K/UL (ref 0–0.12)
BILIRUB SERPL-MCNC: 0.2 MG/DL (ref 0.1–1.5)
BUN SERPL-MCNC: 11 MG/DL (ref 8–22)
CALCIUM ALBUM COR SERPL-MCNC: 9 MG/DL (ref 8.5–10.5)
CALCIUM SERPL-MCNC: 8.4 MG/DL (ref 8.5–10.5)
CHLORIDE SERPL-SCNC: 107 MMOL/L (ref 96–112)
CO2 SERPL-SCNC: 18 MMOL/L (ref 20–33)
CREAT SERPL-MCNC: 0.53 MG/DL (ref 0.5–1.4)
EOSINOPHIL # BLD AUTO: 0.03 K/UL (ref 0–0.51)
EOSINOPHIL NFR BLD: 0.4 % (ref 0–6.9)
ERYTHROCYTE [DISTWIDTH] IN BLOOD BY AUTOMATED COUNT: 43.4 FL (ref 35.9–50)
ERYTHROCYTE [DISTWIDTH] IN BLOOD BY AUTOMATED COUNT: 44 FL (ref 35.9–50)
GFR SERPLBLD CREATININE-BSD FMLA CKD-EPI: 129 ML/MIN/1.73 M 2
GLOBULIN SER CALC-MCNC: 3.2 G/DL (ref 1.9–3.5)
GLUCOSE SERPL-MCNC: 97 MG/DL (ref 65–99)
HCT VFR BLD AUTO: 31.8 % (ref 37–47)
HCT VFR BLD AUTO: 34.8 % (ref 37–47)
HGB BLD-MCNC: 10.5 G/DL (ref 12–16)
HGB BLD-MCNC: 11.6 G/DL (ref 12–16)
HOLDING TUBE BB 8507: NORMAL
IMM GRANULOCYTES # BLD AUTO: 0.02 K/UL (ref 0–0.11)
IMM GRANULOCYTES NFR BLD AUTO: 0.3 % (ref 0–0.9)
LYMPHOCYTES # BLD AUTO: 2.53 K/UL (ref 1–4.8)
LYMPHOCYTES NFR BLD: 33.2 % (ref 22–41)
MCH RBC QN AUTO: 27.3 PG (ref 27–33)
MCH RBC QN AUTO: 27.3 PG (ref 27–33)
MCHC RBC AUTO-ENTMCNC: 33 G/DL (ref 32.2–35.5)
MCHC RBC AUTO-ENTMCNC: 33.3 G/DL (ref 32.2–35.5)
MCV RBC AUTO: 81.9 FL (ref 81.4–97.8)
MCV RBC AUTO: 82.8 FL (ref 81.4–97.8)
MONOCYTES # BLD AUTO: 0.4 K/UL (ref 0–0.85)
MONOCYTES NFR BLD AUTO: 5.3 % (ref 0–13.4)
NEUTROPHILS # BLD AUTO: 4.61 K/UL (ref 1.82–7.42)
NEUTROPHILS NFR BLD: 60.5 % (ref 44–72)
NRBC # BLD AUTO: 0 K/UL
NRBC BLD-RTO: 0 /100 WBC (ref 0–0.2)
PATHOLOGY CONSULT NOTE: NORMAL
PLATELET # BLD AUTO: 250 K/UL (ref 164–446)
PLATELET # BLD AUTO: 268 K/UL (ref 164–446)
PMV BLD AUTO: 11 FL (ref 9–12.9)
PMV BLD AUTO: 11.2 FL (ref 9–12.9)
POTASSIUM SERPL-SCNC: 3.9 MMOL/L (ref 3.6–5.5)
PROT SERPL-MCNC: 6.5 G/DL (ref 6–8.2)
RBC # BLD AUTO: 3.84 M/UL (ref 4.2–5.4)
RBC # BLD AUTO: 4.25 M/UL (ref 4.2–5.4)
SODIUM SERPL-SCNC: 136 MMOL/L (ref 135–145)
T PALLIDUM AB SER QL IA: NORMAL
WBC # BLD AUTO: 10.4 K/UL (ref 4.8–10.8)
WBC # BLD AUTO: 7.6 K/UL (ref 4.8–10.8)

## 2023-12-20 PROCEDURE — 700105 HCHG RX REV CODE 258: Performed by: ANESTHESIOLOGY

## 2023-12-20 PROCEDURE — 88302 TISSUE EXAM BY PATHOLOGIST: CPT | Mod: 59

## 2023-12-20 PROCEDURE — 700111 HCHG RX REV CODE 636 W/ 250 OVERRIDE (IP): Mod: JZ | Performed by: ANESTHESIOLOGY

## 2023-12-20 PROCEDURE — 700111 HCHG RX REV CODE 636 W/ 250 OVERRIDE (IP): Performed by: ANESTHESIOLOGY

## 2023-12-20 PROCEDURE — 3E02340 INTRODUCTION OF INFLUENZA VACCINE INTO MUSCLE, PERCUTANEOUS APPROACH: ICD-10-PCS | Performed by: OBSTETRICS & GYNECOLOGY

## 2023-12-20 PROCEDURE — 36415 COLL VENOUS BLD VENIPUNCTURE: CPT

## 2023-12-20 PROCEDURE — 160029 HCHG SURGERY MINUTES - 1ST 30 MINS LEVEL 4: Performed by: OBSTETRICS & GYNECOLOGY

## 2023-12-20 PROCEDURE — 700102 HCHG RX REV CODE 250 W/ 637 OVERRIDE(OP): Performed by: ANESTHESIOLOGY

## 2023-12-20 PROCEDURE — 160002 HCHG RECOVERY MINUTES (STAT): Performed by: OBSTETRICS & GYNECOLOGY

## 2023-12-20 PROCEDURE — 160041 HCHG SURGERY MINUTES - EA ADDL 1 MIN LEVEL 4: Performed by: OBSTETRICS & GYNECOLOGY

## 2023-12-20 PROCEDURE — 160048 HCHG OR STATISTICAL LEVEL 1-5: Performed by: OBSTETRICS & GYNECOLOGY

## 2023-12-20 PROCEDURE — 770002 HCHG ROOM/CARE - OB PRIVATE (112)

## 2023-12-20 PROCEDURE — 90686 IIV4 VACC NO PRSV 0.5 ML IM: CPT | Performed by: OBSTETRICS & GYNECOLOGY

## 2023-12-20 PROCEDURE — 160035 HCHG PACU - 1ST 60 MINS PHASE I: Performed by: OBSTETRICS & GYNECOLOGY

## 2023-12-20 PROCEDURE — 85025 COMPLETE CBC W/AUTO DIFF WBC: CPT

## 2023-12-20 PROCEDURE — A9270 NON-COVERED ITEM OR SERVICE: HCPCS | Performed by: ANESTHESIOLOGY

## 2023-12-20 PROCEDURE — 160009 HCHG ANES TIME/MIN: Performed by: OBSTETRICS & GYNECOLOGY

## 2023-12-20 PROCEDURE — 86780 TREPONEMA PALLIDUM: CPT

## 2023-12-20 PROCEDURE — C1755 CATHETER, INTRASPINAL: HCPCS | Performed by: OBSTETRICS & GYNECOLOGY

## 2023-12-20 PROCEDURE — 85027 COMPLETE CBC AUTOMATED: CPT

## 2023-12-20 PROCEDURE — 700101 HCHG RX REV CODE 250: Performed by: ANESTHESIOLOGY

## 2023-12-20 PROCEDURE — 700105 HCHG RX REV CODE 258: Performed by: OBSTETRICS & GYNECOLOGY

## 2023-12-20 PROCEDURE — 700111 HCHG RX REV CODE 636 W/ 250 OVERRIDE (IP): Performed by: OBSTETRICS & GYNECOLOGY

## 2023-12-20 PROCEDURE — 90471 IMMUNIZATION ADMIN: CPT

## 2023-12-20 PROCEDURE — 80053 COMPREHEN METABOLIC PANEL: CPT

## 2023-12-20 RX ORDER — OXYCODONE HYDROCHLORIDE 5 MG/1
5 TABLET ORAL EVERY 4 HOURS PRN
Status: ACTIVE | OUTPATIENT
Start: 2023-12-20 | End: 2023-12-21

## 2023-12-20 RX ORDER — KETOROLAC TROMETHAMINE 30 MG/ML
15 INJECTION, SOLUTION INTRAMUSCULAR; INTRAVENOUS EVERY 6 HOURS
Status: COMPLETED | OUTPATIENT
Start: 2023-12-20 | End: 2023-12-21

## 2023-12-20 RX ORDER — CEFAZOLIN SODIUM 1 G/3ML
2 INJECTION, POWDER, FOR SOLUTION INTRAMUSCULAR; INTRAVENOUS ONCE
Status: DISCONTINUED | OUTPATIENT
Start: 2023-12-20 | End: 2023-12-20 | Stop reason: HOSPADM

## 2023-12-20 RX ORDER — EPHEDRINE SULFATE 50 MG/ML
5 INJECTION, SOLUTION INTRAVENOUS
Status: DISCONTINUED | OUTPATIENT
Start: 2023-12-20 | End: 2023-12-20 | Stop reason: HOSPADM

## 2023-12-20 RX ORDER — ONDANSETRON 2 MG/ML
4 INJECTION INTRAMUSCULAR; INTRAVENOUS
Status: DISCONTINUED | OUTPATIENT
Start: 2023-12-20 | End: 2023-12-20 | Stop reason: HOSPADM

## 2023-12-20 RX ORDER — HYDROMORPHONE HYDROCHLORIDE 1 MG/ML
0.2 INJECTION, SOLUTION INTRAMUSCULAR; INTRAVENOUS; SUBCUTANEOUS
Status: DISCONTINUED | OUTPATIENT
Start: 2023-12-20 | End: 2023-12-20 | Stop reason: HOSPADM

## 2023-12-20 RX ORDER — MEPERIDINE HYDROCHLORIDE 25 MG/ML
12.5 INJECTION INTRAMUSCULAR; INTRAVENOUS; SUBCUTANEOUS
Status: DISCONTINUED | OUTPATIENT
Start: 2023-12-20 | End: 2023-12-20 | Stop reason: HOSPADM

## 2023-12-20 RX ORDER — HYDROMORPHONE HYDROCHLORIDE 1 MG/ML
0.5 INJECTION, SOLUTION INTRAMUSCULAR; INTRAVENOUS; SUBCUTANEOUS
Status: DISCONTINUED | OUTPATIENT
Start: 2023-12-20 | End: 2023-12-20 | Stop reason: HOSPADM

## 2023-12-20 RX ORDER — SODIUM CHLORIDE, SODIUM GLUCONATE, SODIUM ACETATE, POTASSIUM CHLORIDE AND MAGNESIUM CHLORIDE 526; 502; 368; 37; 30 MG/100ML; MG/100ML; MG/100ML; MG/100ML; MG/100ML
INJECTION, SOLUTION INTRAVENOUS
Status: DISCONTINUED | OUTPATIENT
Start: 2023-12-20 | End: 2023-12-20 | Stop reason: SURG

## 2023-12-20 RX ORDER — OXYCODONE HYDROCHLORIDE 10 MG/1
10 TABLET ORAL EVERY 4 HOURS PRN
Status: ACTIVE | OUTPATIENT
Start: 2023-12-20 | End: 2023-12-21

## 2023-12-20 RX ORDER — HYDROMORPHONE HYDROCHLORIDE 1 MG/ML
0.4 INJECTION, SOLUTION INTRAMUSCULAR; INTRAVENOUS; SUBCUTANEOUS
Status: ACTIVE | OUTPATIENT
Start: 2023-12-20 | End: 2023-12-21

## 2023-12-20 RX ORDER — ACETAMINOPHEN 500 MG
1000 TABLET ORAL EVERY 6 HOURS
Status: COMPLETED | OUTPATIENT
Start: 2023-12-20 | End: 2023-12-21

## 2023-12-20 RX ORDER — IBUPROFEN 800 MG/1
800 TABLET ORAL EVERY 8 HOURS PRN
Status: DISCONTINUED | OUTPATIENT
Start: 2023-12-24 | End: 2023-12-22 | Stop reason: HOSPADM

## 2023-12-20 RX ORDER — KETOROLAC TROMETHAMINE 30 MG/ML
15 INJECTION, SOLUTION INTRAMUSCULAR; INTRAVENOUS EVERY 6 HOURS
Status: DISCONTINUED | OUTPATIENT
Start: 2023-12-20 | End: 2023-12-20

## 2023-12-20 RX ORDER — ACETAMINOPHEN 500 MG
1000 TABLET ORAL EVERY 6 HOURS
Status: DISCONTINUED | OUTPATIENT
Start: 2023-12-21 | End: 2023-12-22 | Stop reason: HOSPADM

## 2023-12-20 RX ORDER — DIPHENHYDRAMINE HYDROCHLORIDE 50 MG/ML
12.5 INJECTION INTRAMUSCULAR; INTRAVENOUS EVERY 6 HOURS PRN
Status: ACTIVE | OUTPATIENT
Start: 2023-12-20 | End: 2023-12-21

## 2023-12-20 RX ORDER — OXYTOCIN 10 [USP'U]/ML
10 INJECTION, SOLUTION INTRAMUSCULAR; INTRAVENOUS
Status: DISCONTINUED | OUTPATIENT
Start: 2023-12-20 | End: 2023-12-22 | Stop reason: HOSPADM

## 2023-12-20 RX ORDER — DEXMEDETOMIDINE HYDROCHLORIDE 100 UG/ML
INJECTION, SOLUTION INTRAVENOUS PRN
Status: DISCONTINUED | OUTPATIENT
Start: 2023-12-20 | End: 2023-12-20 | Stop reason: SURG

## 2023-12-20 RX ORDER — LABETALOL HYDROCHLORIDE 5 MG/ML
5 INJECTION, SOLUTION INTRAVENOUS
Status: DISCONTINUED | OUTPATIENT
Start: 2023-12-20 | End: 2023-12-20 | Stop reason: HOSPADM

## 2023-12-20 RX ORDER — ONDANSETRON 2 MG/ML
INJECTION INTRAMUSCULAR; INTRAVENOUS PRN
Status: DISCONTINUED | OUTPATIENT
Start: 2023-12-20 | End: 2023-12-20 | Stop reason: SURG

## 2023-12-20 RX ORDER — EPHEDRINE SULFATE 50 MG/ML
10 INJECTION, SOLUTION INTRAVENOUS
Status: ACTIVE | OUTPATIENT
Start: 2023-12-20 | End: 2023-12-21

## 2023-12-20 RX ORDER — BUPIVACAINE HYDROCHLORIDE 7.5 MG/ML
INJECTION, SOLUTION INTRASPINAL PRN
Status: DISCONTINUED | OUTPATIENT
Start: 2023-12-20 | End: 2023-12-20 | Stop reason: SURG

## 2023-12-20 RX ORDER — SODIUM CHLORIDE, SODIUM LACTATE, POTASSIUM CHLORIDE, CALCIUM CHLORIDE 600; 310; 30; 20 MG/100ML; MG/100ML; MG/100ML; MG/100ML
INJECTION, SOLUTION INTRAVENOUS PRN
Status: DISCONTINUED | OUTPATIENT
Start: 2023-12-20 | End: 2023-12-22 | Stop reason: HOSPADM

## 2023-12-20 RX ORDER — SODIUM CHLORIDE, SODIUM GLUCONATE, SODIUM ACETATE, POTASSIUM CHLORIDE AND MAGNESIUM CHLORIDE 526; 502; 368; 37; 30 MG/100ML; MG/100ML; MG/100ML; MG/100ML; MG/100ML
1500 INJECTION, SOLUTION INTRAVENOUS ONCE
Status: COMPLETED | OUTPATIENT
Start: 2023-12-20 | End: 2023-12-20

## 2023-12-20 RX ORDER — CITRIC ACID/SODIUM CITRATE 334-500MG
30 SOLUTION, ORAL ORAL ONCE
Status: COMPLETED | OUTPATIENT
Start: 2023-12-20 | End: 2023-12-20

## 2023-12-20 RX ORDER — CEFAZOLIN SODIUM 1 G/3ML
INJECTION, POWDER, FOR SOLUTION INTRAMUSCULAR; INTRAVENOUS PRN
Status: DISCONTINUED | OUTPATIENT
Start: 2023-12-20 | End: 2023-12-20 | Stop reason: SURG

## 2023-12-20 RX ORDER — OXYCODONE HYDROCHLORIDE 10 MG/1
10 TABLET ORAL EVERY 4 HOURS PRN
Status: DISCONTINUED | OUTPATIENT
Start: 2023-12-21 | End: 2023-12-22 | Stop reason: HOSPADM

## 2023-12-20 RX ORDER — ACETAMINOPHEN 500 MG
1000 TABLET ORAL EVERY 6 HOURS PRN
Status: DISCONTINUED | OUTPATIENT
Start: 2023-12-24 | End: 2023-12-22 | Stop reason: HOSPADM

## 2023-12-20 RX ORDER — OXYCODONE HCL 5 MG/5 ML
10 SOLUTION, ORAL ORAL
Status: DISCONTINUED | OUTPATIENT
Start: 2023-12-20 | End: 2023-12-20 | Stop reason: HOSPADM

## 2023-12-20 RX ORDER — ONDANSETRON 2 MG/ML
4 INJECTION INTRAMUSCULAR; INTRAVENOUS EVERY 6 HOURS PRN
Status: ACTIVE | OUTPATIENT
Start: 2023-12-20 | End: 2023-12-21

## 2023-12-20 RX ORDER — OXYCODONE HYDROCHLORIDE 5 MG/1
5 TABLET ORAL EVERY 4 HOURS PRN
Status: DISCONTINUED | OUTPATIENT
Start: 2023-12-21 | End: 2023-12-22 | Stop reason: HOSPADM

## 2023-12-20 RX ORDER — METOCLOPRAMIDE HYDROCHLORIDE 5 MG/ML
10 INJECTION INTRAMUSCULAR; INTRAVENOUS ONCE
Status: COMPLETED | OUTPATIENT
Start: 2023-12-20 | End: 2023-12-20

## 2023-12-20 RX ORDER — DOCUSATE SODIUM 100 MG/1
100 CAPSULE, LIQUID FILLED ORAL 2 TIMES DAILY PRN
Status: DISCONTINUED | OUTPATIENT
Start: 2023-12-20 | End: 2023-12-22 | Stop reason: HOSPADM

## 2023-12-20 RX ORDER — ONDANSETRON 2 MG/ML
4 INJECTION INTRAMUSCULAR; INTRAVENOUS EVERY 6 HOURS PRN
Status: DISCONTINUED | OUTPATIENT
Start: 2023-12-21 | End: 2023-12-22 | Stop reason: HOSPADM

## 2023-12-20 RX ORDER — DIPHENHYDRAMINE HCL 25 MG
25 TABLET ORAL EVERY 6 HOURS PRN
Status: DISCONTINUED | OUTPATIENT
Start: 2023-12-21 | End: 2023-12-22 | Stop reason: HOSPADM

## 2023-12-20 RX ORDER — DIPHENHYDRAMINE HYDROCHLORIDE 50 MG/ML
12.5 INJECTION INTRAMUSCULAR; INTRAVENOUS
Status: DISCONTINUED | OUTPATIENT
Start: 2023-12-20 | End: 2023-12-20 | Stop reason: HOSPADM

## 2023-12-20 RX ORDER — DEXAMETHASONE SODIUM PHOSPHATE 4 MG/ML
INJECTION, SOLUTION INTRA-ARTICULAR; INTRALESIONAL; INTRAMUSCULAR; INTRAVENOUS; SOFT TISSUE PRN
Status: DISCONTINUED | OUTPATIENT
Start: 2023-12-20 | End: 2023-12-20 | Stop reason: SURG

## 2023-12-20 RX ORDER — HYDROMORPHONE HYDROCHLORIDE 1 MG/ML
0.4 INJECTION, SOLUTION INTRAMUSCULAR; INTRAVENOUS; SUBCUTANEOUS
Status: DISCONTINUED | OUTPATIENT
Start: 2023-12-20 | End: 2023-12-20 | Stop reason: HOSPADM

## 2023-12-20 RX ORDER — HYDROMORPHONE HYDROCHLORIDE 1 MG/ML
0.2 INJECTION, SOLUTION INTRAMUSCULAR; INTRAVENOUS; SUBCUTANEOUS
Status: ACTIVE | OUTPATIENT
Start: 2023-12-20 | End: 2023-12-21

## 2023-12-20 RX ORDER — MORPHINE SULFATE 0.5 MG/ML
INJECTION, SOLUTION EPIDURAL; INTRATHECAL; INTRAVENOUS PRN
Status: DISCONTINUED | OUTPATIENT
Start: 2023-12-20 | End: 2023-12-20 | Stop reason: SURG

## 2023-12-20 RX ORDER — IBUPROFEN 800 MG/1
800 TABLET ORAL EVERY 8 HOURS
Status: DISCONTINUED | OUTPATIENT
Start: 2023-12-21 | End: 2023-12-22 | Stop reason: HOSPADM

## 2023-12-20 RX ORDER — HYDRALAZINE HYDROCHLORIDE 20 MG/ML
5 INJECTION INTRAMUSCULAR; INTRAVENOUS
Status: DISCONTINUED | OUTPATIENT
Start: 2023-12-20 | End: 2023-12-20 | Stop reason: HOSPADM

## 2023-12-20 RX ORDER — SODIUM CHLORIDE, SODIUM GLUCONATE, SODIUM ACETATE, POTASSIUM CHLORIDE AND MAGNESIUM CHLORIDE 526; 502; 368; 37; 30 MG/100ML; MG/100ML; MG/100ML; MG/100ML; MG/100ML
500 INJECTION, SOLUTION INTRAVENOUS CONTINUOUS
Status: DISCONTINUED | OUTPATIENT
Start: 2023-12-20 | End: 2023-12-22 | Stop reason: HOSPADM

## 2023-12-20 RX ORDER — DIPHENHYDRAMINE HYDROCHLORIDE 50 MG/ML
25 INJECTION INTRAMUSCULAR; INTRAVENOUS EVERY 6 HOURS PRN
Status: ACTIVE | OUTPATIENT
Start: 2023-12-20 | End: 2023-12-21

## 2023-12-20 RX ORDER — DIPHENHYDRAMINE HYDROCHLORIDE 50 MG/ML
25 INJECTION INTRAMUSCULAR; INTRAVENOUS EVERY 6 HOURS PRN
Status: DISCONTINUED | OUTPATIENT
Start: 2023-12-21 | End: 2023-12-22 | Stop reason: HOSPADM

## 2023-12-20 RX ORDER — OXYCODONE HCL 5 MG/5 ML
5 SOLUTION, ORAL ORAL
Status: DISCONTINUED | OUTPATIENT
Start: 2023-12-20 | End: 2023-12-20 | Stop reason: HOSPADM

## 2023-12-20 RX ORDER — SIMETHICONE 125 MG
125 TABLET,CHEWABLE ORAL 4 TIMES DAILY PRN
Status: DISCONTINUED | OUTPATIENT
Start: 2023-12-20 | End: 2023-12-22 | Stop reason: HOSPADM

## 2023-12-20 RX ORDER — KETOROLAC TROMETHAMINE 30 MG/ML
INJECTION, SOLUTION INTRAMUSCULAR; INTRAVENOUS PRN
Status: DISCONTINUED | OUTPATIENT
Start: 2023-12-20 | End: 2023-12-20 | Stop reason: SURG

## 2023-12-20 RX ORDER — ONDANSETRON 4 MG/1
4 TABLET, ORALLY DISINTEGRATING ORAL EVERY 6 HOURS PRN
Status: DISCONTINUED | OUTPATIENT
Start: 2023-12-21 | End: 2023-12-22 | Stop reason: HOSPADM

## 2023-12-20 RX ADMIN — SODIUM CHLORIDE, SODIUM GLUCONATE, SODIUM ACETATE, POTASSIUM CHLORIDE AND MAGNESIUM CHLORIDE: 526; 502; 368; 37; 30 INJECTION, SOLUTION INTRAVENOUS at 07:19

## 2023-12-20 RX ADMIN — DEXMEDETOMIDINE 15 MCG: 100 INJECTION, SOLUTION INTRAVENOUS at 08:14

## 2023-12-20 RX ADMIN — FAMOTIDINE 20 MG: 10 INJECTION, SOLUTION INTRAVENOUS at 07:00

## 2023-12-20 RX ADMIN — SODIUM CITRATE AND CITRIC ACID MONOHYDRATE 30 ML: 500; 334 SOLUTION ORAL at 07:00

## 2023-12-20 RX ADMIN — BUPIVACAINE HYDROCHLORIDE IN DEXTROSE 1.6 ML: 7.5 INJECTION, SOLUTION SUBARACHNOID at 07:36

## 2023-12-20 RX ADMIN — ACETAMINOPHEN 1000 MG: 500 TABLET ORAL at 11:18

## 2023-12-20 RX ADMIN — ACETAMINOPHEN 1000 MG: 500 TABLET ORAL at 16:24

## 2023-12-20 RX ADMIN — FENTANYL CITRATE 20 MCG: 50 INJECTION, SOLUTION INTRAMUSCULAR; INTRAVENOUS at 07:36

## 2023-12-20 RX ADMIN — KETOROLAC TROMETHAMINE 30 MG: 30 INJECTION, SOLUTION INTRAMUSCULAR; INTRAVENOUS at 08:24

## 2023-12-20 RX ADMIN — ACETAMINOPHEN 1000 MG: 500 TABLET ORAL at 23:38

## 2023-12-20 RX ADMIN — KETOROLAC TROMETHAMINE 15 MG: 30 INJECTION, SOLUTION INTRAMUSCULAR; INTRAVENOUS at 14:03

## 2023-12-20 RX ADMIN — METOCLOPRAMIDE 10 MG: 5 INJECTION, SOLUTION INTRAMUSCULAR; INTRAVENOUS at 07:00

## 2023-12-20 RX ADMIN — KETOROLAC TROMETHAMINE 15 MG: 30 INJECTION, SOLUTION INTRAMUSCULAR; INTRAVENOUS at 20:02

## 2023-12-20 RX ADMIN — OXYTOCIN 1000 ML: 10 INJECTION, SOLUTION INTRAMUSCULAR; INTRAVENOUS at 07:59

## 2023-12-20 RX ADMIN — PHENYLEPHRINE HYDROCHLORIDE 35 MCG/MIN: 10 INJECTION INTRAVENOUS at 07:37

## 2023-12-20 RX ADMIN — INFLUENZA A VIRUS A/VICTORIA/4897/2022 IVR-238 (H1N1) ANTIGEN (FORMALDEHYDE INACTIVATED), INFLUENZA A VIRUS A/DARWIN/9/2021 SAN-010 (H3N2) ANTIGEN (FORMALDEHYDE INACTIVATED), INFLUENZA B VIRUS B/PHUKET/3073/2013 ANTIGEN (FORMALDEHYDE INACTIVATED), AND INFLUENZA B VIRUS B/MICHIGAN/01/2021 ANTIGEN (FORMALDEHYDE INACTIVATED) 0.5 ML: 15; 15; 15; 15 INJECTION, SUSPENSION INTRAMUSCULAR at 11:19

## 2023-12-20 RX ADMIN — SODIUM CHLORIDE, SODIUM GLUCONATE, SODIUM ACETATE, POTASSIUM CHLORIDE AND MAGNESIUM CHLORIDE 1500 ML: 526; 502; 368; 37; 30 INJECTION, SOLUTION INTRAVENOUS at 06:11

## 2023-12-20 RX ADMIN — CEFAZOLIN 2 G: 1 INJECTION, POWDER, FOR SOLUTION INTRAMUSCULAR; INTRAVENOUS at 07:37

## 2023-12-20 RX ADMIN — OXYTOCIN 125 ML/HR: 10 INJECTION, SOLUTION INTRAMUSCULAR; INTRAVENOUS at 08:39

## 2023-12-20 RX ADMIN — MORPHINE SULFATE 0.15 MG: 0.5 INJECTION, SOLUTION EPIDURAL; INTRATHECAL; INTRAVENOUS at 07:36

## 2023-12-20 RX ADMIN — DEXAMETHASONE SODIUM PHOSPHATE 4 MG: 4 INJECTION INTRA-ARTICULAR; INTRALESIONAL; INTRAMUSCULAR; INTRAVENOUS; SOFT TISSUE at 07:37

## 2023-12-20 RX ADMIN — ONDANSETRON 4 MG: 2 INJECTION INTRAMUSCULAR; INTRAVENOUS at 07:37

## 2023-12-20 ASSESSMENT — FIBROSIS 4 INDEX: FIB4 SCORE: 0.4

## 2023-12-20 ASSESSMENT — EDINBURGH POSTNATAL DEPRESSION SCALE (EPDS)
I HAVE LOOKED FORWARD WITH ENJOYMENT TO THINGS: AS MUCH AS I EVER DID
I HAVE BEEN SO UNHAPPY THAT I HAVE HAD DIFFICULTY SLEEPING: NOT AT ALL
I HAVE FELT SAD OR MISERABLE: NO, NOT AT ALL
I HAVE BEEN ABLE TO LAUGH AND SEE THE FUNNY SIDE OF THINGS: AS MUCH AS I ALWAYS COULD
I HAVE BEEN ANXIOUS OR WORRIED FOR NO GOOD REASON: HARDLY EVER
I HAVE BLAMED MYSELF UNNECESSARILY WHEN THINGS WENT WRONG: NOT VERY OFTEN
I HAVE FELT SCARED OR PANICKY FOR NO GOOD REASON: NO, NOT MUCH
I HAVE BEEN SO UNHAPPY THAT I HAVE BEEN CRYING: ONLY OCCASIONALLY
THINGS HAVE BEEN GETTING ON TOP OF ME: NO, MOST OF THE TIME I HAVE COPED QUITE WELL
THE THOUGHT OF HARMING MYSELF HAS OCCURRED TO ME: NEVER

## 2023-12-20 ASSESSMENT — PATIENT HEALTH QUESTIONNAIRE - PHQ9
2. FEELING DOWN, DEPRESSED, IRRITABLE, OR HOPELESS: NOT AT ALL
1. LITTLE INTEREST OR PLEASURE IN DOING THINGS: NOT AT ALL
SUM OF ALL RESPONSES TO PHQ9 QUESTIONS 1 AND 2: 0

## 2023-12-20 ASSESSMENT — LIFESTYLE VARIABLES
EVER_SMOKED: NEVER
ALCOHOL_USE: NO

## 2023-12-20 ASSESSMENT — PAIN DESCRIPTION - PAIN TYPE: TYPE: SURGICAL PAIN

## 2023-12-20 NOTE — H&P
CHIEF COMPLAINT:  Preoperative history and physical.     HISTORY OF PRESENT ILLNESS:  The patient is a 28-year-old female  4,   para 3 at 39 weeks gestational age.  She has undergone 3 prior    sections and will undergo a repeat  section with bilateral   salpingectomy.  Her pregnancy thus far has been completely uncomplicated.     PAST MEDICAL HISTORY:  Negative.     PAST SURGICAL HISTORY:  Includes 3 prior  sections.     SOCIAL HISTORY:  Negative.     ALLERGIES:  She has no known drug allergies.     LABORATORY DATA:  Current labs show blood type of A positive, rubella immune.    Her group B strep culture is negative.     PHYSICAL EXAMINATION:    VITAL SIGNS:  Stable.  CARDIOVASCULAR:  Regular rate and rhythm.  LUNGS:  Clear.  ABDOMEN:  Gravid.  PELVIC:  Fetal heart tones were always been positive in our office.     ASSESSMENT:    1.  This is a 28-year-old female  4, para 3 at 39 weeks' gestational   age.  2.  History of prior  section x3, for repeat.  3.  Multiparous, desires permanent sterilization.     PLAN:  To proceed with a repeat  section and bilateral salpingectomy.    I have discussed with the patient risks of the procedure to include pain,   bleeding, infection, damage to internal organs, anesthetic risks, HIV and   hepatitis in the event that a transfusion is necessary.  She understands that   salpingectomy is a permanent procedure and that we do not perform a reversal   procedure.  She has signed all of the consent forms and her questions have   been answered in the office.        ______________________________  Corinne E. Capurro, MD CEC/WING    DD:  2023 20:08  DT:  2023 20:20    Job#:  [id number]

## 2023-12-20 NOTE — ANESTHESIA POSTPROCEDURE EVALUATION
Patient: Rain Rosado    Procedure Summary       Date: 23 Room / Location: [facility] OR 01 / SURGERY LABOR AND DELIVERY    Anesthesia Start: 719 Anesthesia Stop: 837    Procedure: REPEAT  SECTION WITH BILATERAL SALPINGECTOMY (Abdomen) Diagnosis:        delivery delivered      (same, del)    Surgeons: Corinne E Capurro, M.D. Responsible Provider: Gabby Mcneill M.D.    Anesthesia Type: spinal ASA Status: 2            Final Anesthesia Type: spinal  Last vitals  BP   122/74   Temp   36.1 °C (97 °F)    Pulse 81   Resp   14    SpO2 97% RA     Anesthesia Post Evaluation    Patient location during evaluation: PACU  Patient participation: complete - patient participated  Level of consciousness: awake and alert    Airway patency: patent  Anesthetic complications: no  Cardiovascular status: hemodynamically stable  Respiratory status: acceptable  Hydration status: euvolemic    PONV: none    patient able to participate, but full recovery from regional anesthesia has not occurred and is not expected within the stipulated timeframe for the completion of the evaluation      No notable events documented.                    Name: Mert YOB: 1995   MR: [id number]      Group Topic:  BH Group Psychotherapy  Group Date:  9/29/2020  Start Time:   5:30 PM  End Time:   6:30 PM  Facilitators:  Luli Mckeon LPC   Department::  [facility]    Due to COVID-19 precautions, this visit was performed via live interactive two-way video with patient's verbal consent.   Clinician Location:Home.  Patient Location: Home.  Verified patient identity:  [x] Yes    D: Patient attended 1 hour of the [facility] DUAL DIAGNOSIS group today. Today, the group discussed the importance of getting medical treatment, counseling and support for mental health issues as part of prevention and treatment of dual diagnosis. The group also discussed getting similar help for children in their family as well.       A: Patient participated in group discussion regarding substance abuse, behavioral health issues, and the need to treat both problems.     Patient shared his personal experience with therapy as a child, stating that he found it very helpful in developing coping skills to help deal with emotional behavioral problems he was experiencing at that time.  He responded supportively to the other group member's expression of guilty feelings regarding her son's current problem.     Level of Participation: active  Quality of Participation: engaged, initiates communication, offered feedback and supportive  Interactions with others: appropriate and gave feedback  Mood/Affect: appropriate and positive  Triggers (if applicable): N/A  Cognition: coherent/clear, goal directed and insightful  Progress: Moderate  Response: Patient was insightful and supportive  Plan: Continue with Group     Treatment Plan: [facility] DUAL DIAGNOSIS GROUP TREATMENT PLAN 90 day review due date: 12/2/2020    Patient Active Problem List   Diagnosis   • Allergic rhinitis, cause unspecified   • Extrinsic asthma, unspecified   • Myopia   • Intrinsic asthma, unspecified   •  Chronic rhinitis   • Concussion, unspecified   • ADHD (attention deficit hyperactivity disorder), combined type   • Other mixed anxiety disorders   • Moderate episode of recurrent major depressive disorder (CMS/HCC)        Visit Diagnoses:  Alcohol use disorder, severe, in sustained remission (CMS/HCC) [F10.21]   Mild episode of recurrent major depressive disorder (CMS/HCC) [F33.0]   Adult ADHD [F90.9]   ELMA (generalized anxiety disorder) [F41.1]

## 2023-12-20 NOTE — ANESTHESIA PREPROCEDURE EVALUATION
Case: [id number] Date/Time: 23    Procedure: REPEAT  SECTION WITH BILATERAL SALPINGECTOMY    Pre-op diagnosis: PRIOR  SECTION, DESIRES PERMANENT STERILIZATION - 39 WEEKS    Location: [facility] OR  / SURGERY LABOR AND DELIVERY    Surgeons: Corinne E Capurro, M.D.            Relevant Problems   CARDIAC   (positive) Pre-eclampsia in third trimester      OB   (positive) Pre-eclampsia in third trimester   (positive) S/P  section     IUP, repeat     Physical Exam    Airway   Mallampati: II  TM distance: >3 FB  Neck ROM: full       Cardiovascular - normal exam  Rhythm: regular  Rate: normal  (-) murmur     Dental - normal exam           Pulmonary - normal exam  Breath sounds clear to auscultation     Abdominal    Neurological - normal exam                   Anesthesia Plan    ASA 2       Plan - spinal   Neuraxial block will be primary anesthetic                Postoperative Plan: Postoperative administration of opioids is intended.    Pertinent diagnostic labs and testing reviewed    Informed Consent:    Anesthetic plan and risks discussed with patient.    Use of blood products discussed with: patient whom consented to blood products.

## 2023-12-20 NOTE — LACTATION NOTE
Initial visit  MOB si P4, repeat c/s today.  Baby boy alysha  Birth wt:7#7.2oz/3380gm  Baby at breast when I visit with deep latch. MOB pushing breast tissue away from baby's nose, discussed how to position for cleared airway. Baby repositioned and unlatches and sleeps.  Reviewed skin to skin benefits for mom and baby. Discussed hunger cues and feeding on cue.  MOB did not breastfeed he other 3 children and plans to both breast and bottle feed this baby. She has given a small amount of formula after the first breastfeed  Discussed  stomach size and importance of frequent feeding to ensure a plentiful milk supply. Discussed offering 5 ml of formula if she gives a bottle.Reviewed normal  feeding frequency of first and 2nd 24 hours and cluster feeding.   MOB is not a client of [facility]. Given paperwork for same to check on qualification.

## 2023-12-20 NOTE — ANESTHESIA TIME REPORT
Anesthesia Start and Stop Event Times       Date Time Event    12/20/2023 0714 Ready for Procedure     0719 Anesthesia Start     0837 Anesthesia Stop          Responsible Staff  12/20/23      Name Role Begin End    Gabby Mcneill M.D. Anesth 0719 0837          Overtime Reason:  no overtime (within assigned shift)    Comments:

## 2023-12-20 NOTE — ANESTHESIA PROCEDURE NOTES
Spinal Block    Date/Time: 12/20/2023 7:36 AM    Performed by: Gabby Mcneill M.D.  Authorized by: Gabby Mcneill M.D.    Start Time:  12/20/2023 7:36 AM  Reason for Block: primary anesthetic    patient identified, IV checked, site marked, risks and benefits discussed, surgical consent, monitors and equipment checked, pre-op evaluation and timeout performed    Patient Position:  Sitting  Prep: ChloraPrep, patient draped and sterile technique    Monitoring:  Blood pressure, continuous pulse oximetry and heart rate  Approach:  Midline  Location:  L3-4  Injection Technique:  Single-shot  Skin infiltration:  Lidocaine  Strength:  1%  Dose:  3ml  Needle Type:  Pencan  Needle Gauge:  25 G  CSF flowing pre/post injection:  Yes  Sensory Level:  T4

## 2023-12-20 NOTE — DISCHARGE PLANNING
Discharge Planning Assessment Post Partum    Reason for Referral: History of anxiety   Address: [address] [place] [address] [place] [address]  Phone: [phone]  Type of Living Situation: stable housing-living with mother and children   Mom Diagnosis: Pregnancy,    Baby Diagnosis: [place]-39 weeks  Primary Language: English     Name of Baby: Noah Stern (: 23)  Father of the Baby involved in baby’s care? Yes, however he is in [place].  MOB stated he was unable to get a [place] Visa.  She plans to go to [place] with the children later on.    Prenatal Care: Dr. Scott   Mom's PCP: No PCP listed   PCP for new baby: Pediatrician list provided to mother     Support System: MOB's mother-Nicol who is at bedside   Coping/Bonding between mother & baby: Yes  Source of Feeding: breast and bottle feeding   Supplies for Infant: prepared for infant; denies any needs    Mom's Insurance: [employer]   Baby Covered on Insurance:Yes  Mother Employed/School: Not currently   Other children in the home/names & ages: three boys; ages 3, 6, 9    Financial Hardship/Income: No   Mom's Mental status: alert and oriented   Services used prior to admit: Medicaid, WIC, food stamps, and TANF    CPS History: No  Psychiatric History: history of anxiety in .  MOB stated she has not experienced any anxiety or depression during this pregnancy.  Discussed PPD and provided counseling and support group resources.   Domestic Violence History: No  Drug/ETOH History: No    Resources Provided: pediatrician list, children and family resource list, post partum support and counseling resources, and diaper bank assistance resources   Referrals Made: diaper bank referral provided      Clearance for Discharge: Infant is cleared to discharge home with parents once medically cleared

## 2023-12-20 NOTE — PROGRESS NOTES
1000- pt to floor. Oriented to room and policies. POC discussed including feeding intervals, I+O documentation, latch support, lochia changes, ambulation, infant temperature management including STS and layering, weights, VS intervals, and discharge planning. Medications and other comfort measures discussed. Call light in reach. Mother reports she would like to breast feed and bottle feed. Infant fed for 5 minutes in pacu.     1015- enfamil supplied to bedside. Paced feeding and supplementation guidelines discussed. Emphasis provided on breast feeding supply and demand as well as feeding cues. Verbalizes understanding. Infant sleeping STS at this time.

## 2023-12-21 PROCEDURE — 700111 HCHG RX REV CODE 636 W/ 250 OVERRIDE (IP): Performed by: ANESTHESIOLOGY

## 2023-12-21 PROCEDURE — 700102 HCHG RX REV CODE 250 W/ 637 OVERRIDE(OP): Performed by: OBSTETRICS & GYNECOLOGY

## 2023-12-21 PROCEDURE — A9270 NON-COVERED ITEM OR SERVICE: HCPCS | Performed by: ANESTHESIOLOGY

## 2023-12-21 PROCEDURE — 770002 HCHG ROOM/CARE - OB PRIVATE (112)

## 2023-12-21 PROCEDURE — A9270 NON-COVERED ITEM OR SERVICE: HCPCS | Performed by: OBSTETRICS & GYNECOLOGY

## 2023-12-21 PROCEDURE — 700102 HCHG RX REV CODE 250 W/ 637 OVERRIDE(OP): Performed by: ANESTHESIOLOGY

## 2023-12-21 PROCEDURE — 0UB70ZZ EXCISION OF BILATERAL FALLOPIAN TUBES, OPEN APPROACH: ICD-10-PCS | Performed by: OBSTETRICS & GYNECOLOGY

## 2023-12-21 RX ADMIN — IBUPROFEN 800 MG: 800 TABLET, FILM COATED ORAL at 14:01

## 2023-12-21 RX ADMIN — ACETAMINOPHEN 1000 MG: 500 TABLET ORAL at 18:07

## 2023-12-21 RX ADMIN — KETOROLAC TROMETHAMINE 15 MG: 30 INJECTION, SOLUTION INTRAMUSCULAR; INTRAVENOUS at 02:11

## 2023-12-21 RX ADMIN — ACETAMINOPHEN 1000 MG: 500 TABLET ORAL at 04:53

## 2023-12-21 RX ADMIN — KETOROLAC TROMETHAMINE 15 MG: 30 INJECTION, SOLUTION INTRAMUSCULAR; INTRAVENOUS at 08:47

## 2023-12-21 RX ADMIN — IBUPROFEN 800 MG: 800 TABLET, FILM COATED ORAL at 22:32

## 2023-12-21 RX ADMIN — DOCUSATE SODIUM 100 MG: 100 CAPSULE, LIQUID FILLED ORAL at 08:48

## 2023-12-21 RX ADMIN — ACETAMINOPHEN 1000 MG: 500 TABLET ORAL at 12:31

## 2023-12-21 ASSESSMENT — PAIN DESCRIPTION - PAIN TYPE
TYPE: ACUTE PAIN;SURGICAL PAIN
TYPE: ACUTE PAIN

## 2023-12-21 NOTE — LACTATION NOTE
Follow up visit:    Met with Rain and her baby boy to provide follow up lactation assistance. Rain is continuing to provide combination feedings of breast and formula to her baby. She reports that her most recent breastfeeding session (at 10:30am) was painful, and her left nipple is now bruised. We reviewed latch and positioning techniques to improve latch depth and prevent further nipple damage. Infant is fussy and tense at this time; Rain is encouraged to place infant skin-to-skin and call for LC assistance when hunger cues present.    Reviewed the importance of paced bottle feeding  in age appropriate volumes and starting each feeding session at the breast. Patient verbalizes understanding.    Feeding plan:    Continue with cue-based breastfeeding, at least once every three hours, for a total of 8+ feeds per 24 hour. Start each feed at breast, then provide formula supplementation (as per maternal preference) as directed by supplemental feeding guidelines.    Patient is encouraged to call RN/LC with any developing questions or concerns, and to follow up with [facility] for outpatient lactation support.

## 2023-12-21 NOTE — PROGRESS NOTES
Obstetrics  Section Postpartum Progress Note    Events  No acute events     Subjective:  Doing well. Reports pain is controlled. Lochia minimal. Ambulating. Voiding without difficulty. + Flatus. No bowel movement. Denies headaches or changes in vision. Breastfeeding.     PHYSICAL EXAM:  Vitals:   Vitals:    23 1811 23 2200 23 0500   BP: 121/77  131/83 135/77   Pulse: 83 82 70 60   Resp: 18 18 17 17   Temp: 36.3 °C (97.4 °F)  36.2 °C (97.2 °F) 36.4 °C (97.5 °F)   TempSrc: Temporal  Temporal Temporal   SpO2: 96% 97% 96% 96%   Weight:       Height:          General: Alert, conversational, pleasant, no acute distress.  CVS: Regular rate.  PULM: No respiratory distress, symmetric expansion.  ABD: Soft, non-tender, non-distended, fundus firm, non-tender, below the umbilicus. Incision dressing clean and dry. No surrounding erythema or drainage.   : Deferred  Extremities: Moves all, trace edema.     Labs Reviewed and Significant for:  Recent Labs     23  0555 23  1558   WBC 7.6 10.4   RBC 4.25 3.84*   HEMOGLOBIN 11.6* 10.5*   HEMATOCRIT 34.8* 31.8*   MCV 81.9 82.8   MCH 27.3 27.3   RDW 43.4 44.0   PLATELETCT 268 250   MPV 11.2 11.0   NEUTSPOLYS 60.50  --    LYMPHOCYTES 33.20  --    MONOCYTES 5.30  --    EOSINOPHILS 0.40  --    BASOPHILS 0.30  --         Assessment and Plan:    Rain Rosado is a 28 y.o.  postop day 1 s/p , Low Transverse  repeat with bilateral salpingectomy at 39w0d    ASSESSMENT:  Postpartum state    PLAN:  - Continue routine postpartum care.   -   Anna Marie Jolley M.D.

## 2023-12-21 NOTE — PROGRESS NOTES
Assessment done. Pt.complains of pain at 3 and patient medicated.IV discontinued. Incision covered with pressure dressing without drainage, swelling or redness.plans to take dressing off when showering today.Lochia scant to light. Pt ambulating in room without dizziness or assistance.Pt. states not passing flatus yet, but no problems urinating.plan of care for today discussed.

## 2023-12-21 NOTE — PROGRESS NOTES
"45-62\" abdominal binder sent to tube station 32, RN notified.     Contact traction for any questions or concerns.   "

## 2023-12-21 NOTE — OP REPORT
DATE OF SERVICE:  2023     PREOPERATIVE DIAGNOSES:  1.  Intrauterine pregnancy at 39 weeks.  2.  Prior  section x3 to undergo repeat.  3.  Multiparous, desires permanent sterilization.     POSTOPERATIVE DIAGNOSES:  1.  Intrauterine pregnancy at 39 weeks.  2.  Prior  section x3 to undergo repeat.  3.  Multiparous, desires permanent sterilization.     PROCEDURES:  Repeat low transverse cervical  section and bilateral   salpingectomy.     PRIMARY SURGEON:  Corinne E. Capurro, MD     ASSISTANT:  Jules Lee MD     ANESTHESIA:  Spinal anesthesia.     COMPLICATIONS:  None.     PATHOLOGY:  Bilateral fallopian tubes.     FINDINGS:  Normal-appearing uterus with bilateral fallopian tubes and ovaries,   viable infant with Apgars of 7 and 9.     DESCRIPTION OF PROCEDURE:  After the patient was taken to the operating room   and her spinal anesthesia was found to be adequate, she was then prepped and   draped in the usual dorsal supine position.  A repeat Pfannenstiel skin   incision was made with the knife.  This incision was carried down through to   the underlying fascia using Bovie cautery.  Fascia was incised and then   extended laterally using Aceves scissors.  The rectus fascia was then dissected   off the underlying rectus muscle using sharp dissection and Bovie cautery.    Peritoneum was then entered sharply and extended using Metzenbaum scissors.    An Christian O layer retractor was then placed into the abdomen as a means of   retraction during the procedure.  Lower uterine segment was then incised with   a knife.  Infant's head was delivered atraumatically.  Nose and mouth bulb   suctioned.  The rest of infant delivered through uncomplicated.  Cord was   clamped x2 and cut after holding for 30 seconds and infant was handed over to   waiting nursing staff.  Manual removal of intact placenta with 3-vessel cord.    Uterus was then removed from the abdomen and the anterior uterus was cleared   of  all clot and debris.  Uterus was then replaced into the abdomen.  Uterine   incision was then repaired in running fashion using 0 Vicryl.  A second   imbricating suture of 0 Vicryl was then performed and hemostasis assured.  The   patient's left fallopian tube was then grasped with 2 Sara clamps.  It was   cauterized and cut and removed for pathologic diagnosis using the LigaSure   instrument.  Same procedure was then performed on the patient's right   fallopian tube.  An Christian O layer retractor was then removed from the   abdomen.  Peritoneum was repaired in a running fashion using 3-0 Vicryl.    Fascia was then repaired in a running fashion using 0 Vicryl.  Subcutaneous   tissues were then copiously irrigated and hemostasis was assured.  These   tissues were then repaired using 3-0 Vicryl.  Skin was then closed using   Insorb staples.  Hemostasis was assured.  Mepilex dressing was then placed   over this dressing site.  The patient returned to recovery in stable   condition.  Sponge, lap and needle counts were correct x3 at the end of the   procedure.        ______________________________  Corinne E. Capurro, MD CEC/JOJO    DD:  12/21/2023 07:33  DT:  12/21/2023 07:59    Job#:  [id number]

## 2023-12-21 NOTE — CARE PLAN
The patient is Stable - Low risk of patient condition declining or worsening    Shift Goals  Clinical Goals: lochia WDL  Patient Goals: pain WDL  Family Goals: bond    Progress made toward(s) clinical / shift goals:    Problem: Respiratory/Oxygenation Function Post-Surgical  Goal: Patient will achieve/maintain normal respiratory rate/effort  Outcome: Progressing     Problem: Early Mobilization - Post Surgery  Goal: Early mobilization post surgery  Outcome: Progressing       Patient is not progressing towards the following goals:

## 2023-12-21 NOTE — PROGRESS NOTES
Assumed care of patient, report at bedside from, Shayy FORD. Assessment completed and WDL. Call light within reach, discussed plan of care, denies pain at the moment. Will continue to monitor.

## 2023-12-22 VITALS
HEART RATE: 106 BPM | RESPIRATION RATE: 20 BRPM | SYSTOLIC BLOOD PRESSURE: 136 MMHG | HEIGHT: 68 IN | WEIGHT: 230 LBS | TEMPERATURE: 98.7 F | OXYGEN SATURATION: 96 % | BODY MASS INDEX: 34.86 KG/M2 | DIASTOLIC BLOOD PRESSURE: 94 MMHG

## 2023-12-22 LAB
ALBUMIN SERPL BCP-MCNC: 3.1 G/DL (ref 3.2–4.9)
ALBUMIN/GLOB SERPL: 0.9 G/DL
ALP SERPL-CCNC: 102 U/L (ref 30–99)
ALT SERPL-CCNC: 13 U/L (ref 2–50)
ANION GAP SERPL CALC-SCNC: 10 MMOL/L (ref 7–16)
AST SERPL-CCNC: 17 U/L (ref 12–45)
BILIRUB SERPL-MCNC: <0.2 MG/DL (ref 0.1–1.5)
BUN SERPL-MCNC: 6 MG/DL (ref 8–22)
CALCIUM ALBUM COR SERPL-MCNC: 9.4 MG/DL (ref 8.5–10.5)
CALCIUM SERPL-MCNC: 8.7 MG/DL (ref 8.5–10.5)
CHLORIDE SERPL-SCNC: 104 MMOL/L (ref 96–112)
CO2 SERPL-SCNC: 25 MMOL/L (ref 20–33)
CREAT SERPL-MCNC: 0.55 MG/DL (ref 0.5–1.4)
ERYTHROCYTE [DISTWIDTH] IN BLOOD BY AUTOMATED COUNT: 45.6 FL (ref 35.9–50)
GFR SERPLBLD CREATININE-BSD FMLA CKD-EPI: 128 ML/MIN/1.73 M 2
GLOBULIN SER CALC-MCNC: 3.6 G/DL (ref 1.9–3.5)
GLUCOSE SERPL-MCNC: 101 MG/DL (ref 65–99)
HCT VFR BLD AUTO: 32.9 % (ref 37–47)
HGB BLD-MCNC: 10.9 G/DL (ref 12–16)
MCH RBC QN AUTO: 27.8 PG (ref 27–33)
MCHC RBC AUTO-ENTMCNC: 33.1 G/DL (ref 32.2–35.5)
MCV RBC AUTO: 83.9 FL (ref 81.4–97.8)
PLATELET # BLD AUTO: 259 K/UL (ref 164–446)
PMV BLD AUTO: 10.3 FL (ref 9–12.9)
POTASSIUM SERPL-SCNC: 3.6 MMOL/L (ref 3.6–5.5)
PROT SERPL-MCNC: 6.7 G/DL (ref 6–8.2)
RBC # BLD AUTO: 3.92 M/UL (ref 4.2–5.4)
SODIUM SERPL-SCNC: 139 MMOL/L (ref 135–145)
WBC # BLD AUTO: 8.5 K/UL (ref 4.8–10.8)

## 2023-12-22 PROCEDURE — 700102 HCHG RX REV CODE 250 W/ 637 OVERRIDE(OP): Performed by: OBSTETRICS & GYNECOLOGY

## 2023-12-22 PROCEDURE — 85027 COMPLETE CBC AUTOMATED: CPT

## 2023-12-22 PROCEDURE — 36415 COLL VENOUS BLD VENIPUNCTURE: CPT

## 2023-12-22 PROCEDURE — RXMED WILLOW AMBULATORY MEDICATION CHARGE: Performed by: OBSTETRICS & GYNECOLOGY

## 2023-12-22 PROCEDURE — 80053 COMPREHEN METABOLIC PANEL: CPT

## 2023-12-22 PROCEDURE — A9270 NON-COVERED ITEM OR SERVICE: HCPCS | Performed by: OBSTETRICS & GYNECOLOGY

## 2023-12-22 RX ORDER — IBUPROFEN 800 MG/1
800 TABLET ORAL EVERY 8 HOURS
Qty: 30 TABLET | Refills: 1 | Status: SHIPPED | OUTPATIENT
Start: 2023-12-22 | End: 2024-01-16

## 2023-12-22 RX ORDER — OXYCODONE HYDROCHLORIDE 5 MG/1
5 TABLET ORAL EVERY 4 HOURS PRN
Qty: 30 TABLET | Refills: 0 | Status: SHIPPED | OUTPATIENT
Start: 2023-12-22 | End: 2023-12-30

## 2023-12-22 RX ADMIN — ACETAMINOPHEN 1000 MG: 500 TABLET ORAL at 05:59

## 2023-12-22 RX ADMIN — OXYCODONE 5 MG: 5 TABLET ORAL at 02:13

## 2023-12-22 RX ADMIN — OXYCODONE 5 MG: 5 TABLET ORAL at 08:41

## 2023-12-22 RX ADMIN — ACETAMINOPHEN 1000 MG: 500 TABLET ORAL at 00:08

## 2023-12-22 RX ADMIN — ACETAMINOPHEN 1000 MG: 500 TABLET ORAL at 12:22

## 2023-12-22 RX ADMIN — IBUPROFEN 800 MG: 800 TABLET, FILM COATED ORAL at 05:59

## 2023-12-22 ASSESSMENT — PAIN DESCRIPTION - PAIN TYPE
TYPE: ACUTE PAIN
TYPE: ACUTE PAIN;SURGICAL PAIN
TYPE: SURGICAL PAIN
TYPE: ACUTE PAIN
TYPE: ACUTE PAIN
TYPE: SURGICAL PAIN
TYPE: SURGICAL PAIN
TYPE: ACUTE PAIN

## 2023-12-22 NOTE — PROGRESS NOTES
Assessment done. Pt.complains of pain at 4- 5 and patient medicated. Incision covered with mepilex dressing  without drainage, swelling or redness.Lochia scant to light. Pt ambulating in room without dizziness or assistance.Pt. Passing flatus, no problems urinating.   Discussed symptoms to watch for with high blood pressure postpartum. Patient states she has a headache at her temples but not a frontal head ache.Not seeing white flashes or changes in her vision.plan of care for today discussed.

## 2023-12-22 NOTE — PROGRESS NOTES
Discharge instructions reviewed by patient.we reviewed hypertensive symptoms to be aware of such as visual changes or flashing stars, severe headaches that are not relieved with pain medication, pain over her liver area,swelling in hands or face, difficulty breathing or urinating less than usual.  Also reviewed  screen, patient and baby f/u appts . Car seat present. Birth certificate done. patient received meds to bed. Bands matched and security tag removed.Mom and baby ready for discharge home.

## 2023-12-22 NOTE — LACTATION NOTE
Rain is a  who delivered via repeat C/S on . She has 10, 8, and 4 year old children at home. The first baby received pump milk for first several months, the younger children were formula fed. This baby born at 39 weeks and weighed 3380 grams/7lbs. 7.2 ounces. Today's weight is 3835 grams/8 lbs. 7.3 ounces, (-3.2%).    Rain's plan is to pump and offer her breastmilk via bottle. She recently enrolled in [facility]. Manual pump will be given in preparation for discharge home until mom can obtain a double electric pump.     Assistance offered putting baby to breast but mom declined offer.

## 2023-12-22 NOTE — DISCHARGE PLANNING
Meds-to-Beds: Discharge prescription orders listed below delivered to patient's bedside. RN notified. Patient counseled.     Current Outpatient Medications   Medication Sig Dispense Refill    ibuprofen (MOTRIN) 800 MG Tab Take 1 Tablet by mouth every 8 hours. 30 Tablet 1    oxyCODONE immediate-release (ROXICODONE) 5 MG Tab Take 1 Tablet by mouth every four hours as needed for Severe Pain for up to 7 days. 30 Tablet 0      Teagn Shipman, PharmD

## 2023-12-22 NOTE — PROGRESS NOTES
Contacted Dr. Frias and reported 0800, 1000 and 1200 b/p's to her and patients recent c/o dizziness and temporal HA. Labs ordered by Dr. Frias. Patient updated on plan of care.

## 2023-12-22 NOTE — CARE PLAN
The patient is Stable - Low risk of patient condition declining or worsening    Shift Goals  Clinical Goals: Maintain VSS,ambulate in room  Patient Goals: pain well controlled,bond with baby  Family Goals: support attend to infant needs    Progress made toward(s) clinical / shift goals:    Problem: Knowledge Deficit - Postpartum  Goal: Patient will verbalize and demonstrate understanding of self and infant care  Outcome: Progressing patient caring for infant with good skill     Problem: Altered Physiologic Condition  Goal: Patient physiologically stable as evidenced by normal lochia, palpable uterine involution and vitals within normal limits  Outcome: Progressing lochia scant to light, uterus firm at umbilicus.       Patient is not progressing towards the following goals:

## 2023-12-22 NOTE — PROGRESS NOTES
Updated Dr. Frias on labs she wanted drawn and she decided patient is ok to discharge after hypertension  precautions reviewed and added appointment for next week for b/p check in the office.

## 2023-12-22 NOTE — CARE PLAN
The patient is Watcher - Medium risk of patient condition declining or worsening    Shift Goals  Clinical Goals: pain control, rest and lochia WDL  Patient Goals: rest  Family Goals: support    Progress made toward(s) clinical / shift goals:      Problem: Knowledge Deficit - Standard  Goal: Patient and family/care givers will demonstrate understanding of plan of care, disease process/condition, diagnostic tests and medications  Outcome: Progressing     Problem: Pain - Standard  Goal: Alleviation of pain or a reduction in pain to the patient’s comfort goal  Outcome: Progressing   Pt using scheduled tylenol, motrin and PRN oxycodone for pain. Able to perform activities at a comfortable pain level.     Patient is not progressing towards the following goals:

## 2023-12-22 NOTE — DISCHARGE INSTRUCTIONS

## 2023-12-22 NOTE — PROGRESS NOTES
Assessment completed, abdomen incision dressing CDI. Fundus is firm, lochia light, patient voiding normally. Pt ambulating independently. Bands verified matching baby. Needs are met at this time. Encouraged to call with any needs.

## 2023-12-22 NOTE — PROGRESS NOTES
Assumed care at 0015. Pt c/o headache. Scheduled tylenol given. /97. Plan to recheck in 30 minutes. Charge RN aware.    0056-Repeat BP: 148/90, continues to c/o headache. Dr. Ding updated. No new orders.

## 2023-12-22 NOTE — PROGRESS NOTES
RN called to report patient's blood pressures prior to discharge home.  She has a diagnosis of gestational hypertension.  She had normal preeclampsia labs on admission.  Her blood pressure this morning prior to signout were in the 130s to 150s over 90s to 100s.  Repeat preeclampsia labs were performed today and they were normal.  The patient had previously complained of a headache and dizziness after receiving her oxycodone and her symptoms have resolved now.  The patient's blood pressures this afternoon have been in the 120s to 130s over 80s to 90s.  The patient does not require any antihypertensive therapy.  She was recommended to follow-up in 1 week in our office for a blood pressure check.  RN to discuss preeclampsia precautions.    Lexi Frias MD

## 2023-12-22 NOTE — DISCHARGE SUMMARY
Discharge Summary:      Rain Rosado    Admit Date:   2023  Discharge Date:  2023     Admitting diagnosis:  Indication for care in labor or delivery [O75.9]  Discharge Diagnosis: Status post  for repeat.  Pregnancy Complications: none  Tubal Ligation:  yes        History:  Past Medical History:   Diagnosis Date    Anxiety     sob with anxiety only    Pre-eclampsia in third trimester 2019    Pregnant 2023    repeat c/section scheduled on 2023     OB History    Para Term  AB Living   4 4 4     4   SAB IAB Ectopic Molar Multiple Live Births           0 2      # Outcome Date GA Lbr Jonathan/2nd Weight Sex Delivery Anes PTL Lv   4 Term 23 39w0d  3.38 kg (7 lb 7.2 oz) M CS-LTranv Spinal N PHYLLIS   3 Term 19 38w1d  3.47 kg (7 lb 10.4 oz) M CS-LTranv Spinal N PHYLLIS   2 Term            1 Term                 Patient has no known allergies.  Patient Active Problem List    Diagnosis Date Noted    Indication for care in labor or delivery 2023    Back pain affecting pregnancy in third trimester 2023    Viral warts 2022    Chlamydia contact 2021    Encounter to establish care 2021    Anxiety 2021    Thoracic back pain 2021    S/P  section 2019    Acute post-operative pain 2019    Pre-eclampsia in third trimester 2019        Hospital Course:   28 y.o. , now para 4, was admitted with the above mentioned diagnosis, underwent Repeat  repeat, . Patient postpartum course was unremarkable, with progressive advancement in diet , ambulation and toleration of oral analgesia. Patient without complaints today and desires discharge.      Vitals:    23 0500 23 0016 23 0056 23 0545   BP: 135/77 (!) 149/97 (!) 148/90 (!) 139/96   Pulse: 60 75 82 74   Resp:   18   Temp: 36.4 °C (97.5 °F) 36.4 °C (97.6 °F)  36.1 °C (96.9 °F)   TempSrc: Temporal Temporal  Temporal    SpO2: 96% 95% 96% 99%   Weight:       Height:           Current Facility-Administered Medications   Medication Dose    oxytocin (Pitocin) infusion (for post delivery)  125 mL/hr    oxytocin (Pitocin) injection 10 Units  10 Units    electrolyte-A (Plasmalyte-A) infusion 500 mL  500 mL    lactated ringers infusion      ibuprofen (Motrin) tablet 800 mg  800 mg    Followed by    [START ON 12/24/2023] ibuprofen (Motrin) tablet 800 mg  800 mg    acetaminophen (Tylenol) tablet 1,000 mg  1,000 mg    Followed by    [START ON 12/24/2023] acetaminophen (Tylenol) tablet 1,000 mg  1,000 mg    oxyCODONE immediate-release (Roxicodone) tablet 5 mg  5 mg    oxyCODONE immediate release (Roxicodone) tablet 10 mg  10 mg    ondansetron (Zofran) syringe/vial injection 4 mg  4 mg    Or    ondansetron (Zofran ODT) dispertab 4 mg  4 mg    diphenhydrAMINE (Benadryl) tablet/capsule 25 mg  25 mg    Or    diphenhydrAMINE (Benadryl) injection 25 mg  25 mg    docusate sodium (Colace) capsule 100 mg  100 mg    simethicone (Mylicon) chewable tablet 125 mg  125 mg       Exam:  Breast Exam: negative  Abdomen: Abdomen soft, non-tender. BS normal. No masses,  No organomegaly  Fundus Non Tender: yes  Incision: dry and intact  Perineum: perineum intact  Extremity: extremities, peripheral pulses and reflexes normal     Labs:  Recent Labs     12/20/23  0555 12/20/23  1558   WBC 7.6 10.4   RBC 4.25 3.84*   HEMOGLOBIN 11.6* 10.5*   HEMATOCRIT 34.8* 31.8*   MCV 81.9 82.8   MCH 27.3 27.3   MCHC 33.3 33.0   RDW 43.4 44.0   PLATELETCT 268 250   MPV 11.2 11.0        Activity:   Discharge to home  Pelvic Rest x 6 weeks    Assessment:  normal postpartum course  Discharge Assessment: No areas of skin breakdown/redness; surgical incision intact/healing     Follow up: gerald 2 weeks.      Discharge Meds:   Current Outpatient Medications   Medication Sig Dispense Refill    ibuprofen (MOTRIN) 800 MG Tab Take 1 Tablet by mouth every 8 hours. 30 Tablet 1    oxyCODONE  immediate-release (ROXICODONE) 5 MG Tab Take 1 Tablet by mouth every four hours as needed for Severe Pain for up to 7 days. 30 Tablet 0       Mariely Ding M.D.

## 2023-12-24 ENCOUNTER — HOSPITAL ENCOUNTER (EMERGENCY)
Facility: MEDICAL CENTER | Age: 28
End: 2023-12-24
Attending: OBSTETRICS & GYNECOLOGY | Admitting: OBSTETRICS & GYNECOLOGY
Payer: COMMERCIAL

## 2023-12-24 VITALS
OXYGEN SATURATION: 96 % | HEART RATE: 91 BPM | HEIGHT: 68 IN | BODY MASS INDEX: 34.86 KG/M2 | SYSTOLIC BLOOD PRESSURE: 131 MMHG | WEIGHT: 230 LBS | TEMPERATURE: 97 F | RESPIRATION RATE: 18 BRPM | DIASTOLIC BLOOD PRESSURE: 77 MMHG

## 2023-12-24 LAB
APPEARANCE UR: CLEAR
COLOR UR AUTO: YELLOW
GLUCOSE UR QL STRIP.AUTO: NEGATIVE MG/DL
KETONES UR QL STRIP.AUTO: NEGATIVE MG/DL
LEUKOCYTE ESTERASE UR QL STRIP.AUTO: NEGATIVE
NITRITE UR QL STRIP.AUTO: NEGATIVE
PH UR STRIP.AUTO: 6.5 [PH] (ref 5–8)
PROT UR QL STRIP: NEGATIVE MG/DL
RBC UR QL AUTO: ABNORMAL
SP GR UR STRIP.AUTO: 1.01 (ref 1–1.03)

## 2023-12-24 PROCEDURE — 81002 URINALYSIS NONAUTO W/O SCOPE: CPT

## 2023-12-24 PROCEDURE — 99282 EMERGENCY DEPT VISIT SF MDM: CPT

## 2023-12-24 ASSESSMENT — PAIN SCALES - GENERAL: PAINLEVEL: 0 - NO PAIN

## 2023-12-24 ASSESSMENT — FIBROSIS 4 INDEX: FIB4 SCORE: 0.49

## 2023-12-25 ENCOUNTER — PHARMACY VISIT (OUTPATIENT)
Dept: PHARMACY | Facility: MEDICAL CENTER | Age: 28
End: 2023-12-25
Payer: MEDICARE

## 2023-12-25 NOTE — ED PROVIDER NOTES
"Department of Obstetrics and Gynecology  Labor and Delivery History and Physical  OB ED Note    Date of Admission: 2023     ID: 28 y.o.  who is POD#4     Primary OB: Corinne E Capurro, M.D.     Attending OB: Urmila Ramirez M.D.     CC: Concerns about her incision    HPI: Rain Rosado is a 28 y.o.  who is postoperative day #4 from a repeat  section and bilateral salpingectomy.  She states that her Mepilex dressing was starting to come off so she picked under it and was worried that her incision was open and draining blood.  She denies fevers or chills.  Her postoperative pain is minimal.  She is taking occasional Motrin.  She reports that she is ready having regular bowel habits.  She notes mild dysuria.  Her lochia is normal.  She has no other concerns today    ROS: 10 systems reviewed and negative except as noted above.    Obstetric History     S/p ERCS x 4   Hx of PreE with prior pregnancy         Past Medical History  Surgical History   Obesity   LTCS        Gynecologic History  Social History   Not reviewed Tobacco: Denies  EtOH: Denies  Street Drugs: Denies      Medications  Allergies   No current facility-administered medications on file prior to encounter.     Current Outpatient Medications on File Prior to Encounter   Medication Sig Dispense Refill    ibuprofen (MOTRIN) 800 MG Tab Take 1 Tablet by mouth every 8 hours. 30 Tablet 1    oxyCODONE immediate-release (ROXICODONE) 5 MG Tab Take 1 Tablet by mouth every four hours as needed for Severe Pain for up to 7 days. 30 Tablet 0    Patient has no known allergies.       O: /77   Pulse 91   Temp 36.1 °C (97 °F) (Temporal)   Resp 18   Ht 1.727 m (5' 8\")   Wt 104 kg (230 lb)   SpO2 96%   BMI 34.97 kg/m²     Gen: NAD, AAO  Abd: Mepilex dressing removed.  She has ecchymosis in various stages of healing with some deep purple ecchymosis inferior to the incision.  There is some yellow ecchymosis lateral to the " incision.  There is no erythema.  The incision is well-approximated.  I have palpated the entire incision and there is no fluctuance or drainage.  Superior to the Mepilex covering there was a 1 cm linear disruption in the skin which appears consistent with possible cautery injury.  There is no erythema or drainage with this.  Ext: NTTP, no edema, 2+DPP  Pelvic: Deferred    Labs:    Latest Reference Range & Units 23 19:41   POC Color  Yellow   POC Appearance  Clear   POC Specific Gravity 1.005 - 1.030  1.010   POC Urine PH 5.0 - 8.0  6.5   POC Glucose Negative mg/dL Negative   POC Ketones Negative mg/dL Negative   POC Protein Negative mg/dL Negative   POC Nitrites Negative  Negative   POC Leukocyte Esterase Negative  Negative   POC Blood Negative  Moderate !   !: Data is abnormal      A/P: Rain Rosado is a 28 y.o.  postoperative day 4 with concerns about her incision    Incision: No current signs or symptoms of infection.  The incision is well-approximated and consistent with normal healing.  Notably, the area above her incision appears consistent with a possible cautery injury.  She has no complaints regarding this.  She states it was not present prior to her surgery.  She states that she first noticed it while on postpartum. An image of this was taken and documented to the chart.  Steri-Strips were applied to her incision for support.  Incisional care and cleansing were reviewed with her.   2.  Dysuria: UA today is unremarkable.  Encouraged hydration.  I suspect that this may be        related to ureteral irritation from her recent Trujillo catheter.     Urmila Ramirez M.D.,  2023, 8:19 PM

## 2023-12-25 NOTE — PROGRESS NOTES
PP C/S x4 1920 - Pt ambulates to LDA 1 in stable condition w C/O bleeding at her incision site and painful urination. Patient states she has had unremarkable pregnancy and delivery. Patient denies any complications prior to D/C. Patient states since she has been home her C/S dressing has began lifting - where she then saw what she believes is an opening in her incision. The patient applied additional paper tape to reinforce her dressing. Patient noted she had some blood on her dressing. Upon assessment the dressing is reinforced and intact - there is a small drop of dried blood noted on mepilex, though otherwise clean. Additionally patient states she has been having burning with urination. Patient denies urgency or frequency. See Results for POC urine dip. VSS, patient denies S/S      -  Working updated of pt arrival, status, complaint, hx, VS and assessment. MD to come to bedside.      - Working to bedside. Patient education provided, reassurance regarding painful urination. C/S incision assessed. Orders to redress w steristrips.      - This RN to bedside. Discharge instructions, follow up and when to return reviewed with patient. All questions answered, no further needs noted. Patient ambulates off unit in stable condition.

## 2023-12-26 PROCEDURE — RXMED WILLOW AMBULATORY MEDICATION CHARGE: Performed by: OBSTETRICS & GYNECOLOGY

## 2023-12-27 ENCOUNTER — PHARMACY VISIT (OUTPATIENT)
Dept: PHARMACY | Facility: MEDICAL CENTER | Age: 28
End: 2023-12-27
Payer: MEDICARE

## 2023-12-28 ENCOUNTER — HOSPITAL ENCOUNTER (EMERGENCY)
Facility: MEDICAL CENTER | Age: 28
End: 2023-12-28
Attending: OBSTETRICS & GYNECOLOGY | Admitting: OBSTETRICS & GYNECOLOGY
Payer: COMMERCIAL

## 2023-12-28 VITALS
HEIGHT: 68 IN | HEART RATE: 78 BPM | WEIGHT: 219 LBS | TEMPERATURE: 96.7 F | DIASTOLIC BLOOD PRESSURE: 77 MMHG | SYSTOLIC BLOOD PRESSURE: 121 MMHG | BODY MASS INDEX: 33.19 KG/M2

## 2023-12-28 PROCEDURE — 302449 STATCHG TRIAGE ONLY (STATISTIC)

## 2023-12-28 ASSESSMENT — FIBROSIS 4 INDEX: FIB4 SCORE: 0.49

## 2023-12-28 NOTE — DISCHARGE INSTRUCTIONS
CALL OFFICE FOR FOLLOW UP- BE SEEN BY MONDAY     Delivery, Care After  The following information offers guidance on how to care for yourself after your procedure. Your health care provider may also give you more specific instructions. If you have problems or questions, contact your health care provider.  What can I expect after the procedure?  After the procedure, it is common to have:  A small amount of blood or clear fluid coming from the incision.  Some redness, swelling, and pain in your incision area.  Some abdominal pain and soreness.  Vaginal bleeding (lochia). Even though you did not have a vaginal delivery, you will still have vaginal bleeding and discharge.  Pelvic cramps.  Fatigue.  You may have pain, swelling, and discomfort in the tissue between your vagina and your anus (perineum) if:  Your  was unplanned, and you were allowed to labor and push.  An incision was made in the area (episiotomy) or the tissue tore during attempted vaginal delivery.  Follow these instructions at home:  Medicines  Take over-the-counter and prescription medicines only as told by your health care provider.  If you were prescribed an antibiotic medicine, take it as told by your health care provider. Do not stop taking the antibiotic even if you start to feel better.  Ask your health care provider if the medicine prescribed to you requires you to avoid driving or using machinery.  Incision care    Follow instructions from your health care provider about how to take care of your incision. Make sure you:  Wash your hands with soap and water for an least 20 seconds before and after you change your bandage (dressing). If soap and water are not available, use hand .  If you have a dressing, change it or remove it as told by your health care provider.  Leave stitches (sutures), skin staples, skin glue, or adhesive strips in place. These skin closures may need to stay in place for 2 weeks or longer. If  adhesive strip edges start to loosen and curl up, you may trim the loose edges. Do not remove adhesive strips completely unless your health care provider tells you to do that.  Check your incision area every day for signs of infection. Check for:  More redness, swelling, or pain.  More fluid or blood.  Warmth.  Pus or a bad smell.  Do not take baths, swim, or use a hot tub until your health care provider approves. Ask your health care provider if you may take showers.  When you cough or sneeze, hug a pillow. This helps with pain and decreases the chance of your incision opening up (dehiscing). Do this until your incision heals.  Managing constipation  Your procedure may cause constipation. To prevent or treat constipation, you may need to:  Drink enough fluid to keep your urine pale yellow.  Take over-the-counter or prescription medicines.  Eat foods that are high in fiber, such as beans, whole grains, and fresh fruits and vegetables.  Limit foods that are high in fat and processed sugars, such as fried or sweet foods.  Activity    If possible, have someone help you care for your baby and help with household activities for at least a few days after you leave the hospital.  Rest as much as possible. Try to rest or take a nap while your baby is sleeping.  You may have to avoid lifting. Ask your health care provider how much you can safely lift.  Return to your normal activities as told by your health care provider. Ask your health care provider what activities are safe for you.  Talk with your health care provider about when you can engage in sexual activity. This may depend on your:  Risk of infection.  How fast you heal.  Comfort and desire to engage in sexual activity.  Lifestyle  Do not drink alcohol. This is especially important if you are breastfeeding or taking pain medicine.  Do not use any products that contain nicotine or tobacco. These products include cigarettes, chewing tobacco, and vaping devices, such  as e-cigarettes. If you need help quitting, ask your health care provider.  General instructions  Do not use tampons or douches until your health care provider approves.  Wear loose, comfortable clothing and a supportive and well-fitting bra.  If you pass a blood clot, save it and call your health care provider to discuss. Do not flush blood clots down the toilet before you get instructions from your health care provider.  Keep all follow-up visits for you and your baby. This is important.  Contact a health care provider if:  You have:  A fever.  Dizziness or light-headedness.  Bad-smelling vaginal discharge.  A blood clot pass from your vagina.  Pus, blood, or a bad smell coming from your incision.  An incision that feels warm to the touch.  More redness, swelling, or pain around your incision.  Difficulty or pain when urinating.  Nausea or vomiting.  Little or no interest in activities you used to enjoy.  Your breasts turn red or become painful or hard.  You feel unusually sad or worried.  You have questions about caring for yourself or your baby.  You have redness, swelling, and pain in an arm or leg.  Get help right away if:  You have:  Pain that does not go away or get better with medicine.  Chest pain.  Trouble breathing.  Blurred vision, spots, or flashing lights in your vision.  Thoughts about hurting yourself or your baby.  New pain in your abdomen or in one of your legs.  A severe headache that does not get better with pain medicine.  You faint.  You bleed from your vagina so much that you fill more than one sanitary pad in one hour. Bleeding should not be heavier than your heaviest period.  These symptoms may be an emergency. Get help right away. Call 911.  Do not wait to see if the symptoms will go away.  Do not drive yourself to the hospital.  Get help right away if you feel like you may hurt yourself or others, or have thoughts about taking your own life. Go to your nearest emergency room or:  Call  204.  Call the National Suicide Prevention Lifeline at 1-931.529.4763 or 753. This is open 24 hours a day.  Text the Crisis Text Line at 320784.  Summary  After the procedure, it is common to have pain at your incision site, abdominal cramping, and slight bleeding from your vagina.  Check your incision area every day for signs of infection.  Tell your health care provider about any unusual symptoms.  Keep all follow-up visits for you and your baby. This is important.  This information is not intended to replace advice given to you by your health care provider. Make sure you discuss any questions you have with your health care provider.  Document Revised: 07/20/2022 Document Reviewed: 07/20/2022  Elsevier Patient Education © 2023 [employer]

## 2023-12-28 NOTE — ED PROVIDER NOTES
"OB ED Note    CC:Incisional drainage    HPI: Rain Rosado is POD #8 s/p RLTCS who presents today with complaints of incisional drainage.  She was seen for complaints of bleeding on POD#4.  Ecchymosis at various stages of healing was noted with a darker line of discoloration below her incision and concern for possible cautery injury above her incision.  Her incision was intact and there was no signs of infection at the time.  Steri-Strips were placed for support.  She was seen in the office with complaints of leaking fluid on POD#6.  Her incision was reported to have a yeast infection appearance and she was prescribed nystatin topical.  She has been using this.  She reports some drainage on her dressing today.  She denies significant pain.  She denies fever or chills.  She denies bleeding from her incision.    /77   Pulse 78   Temp 35.9 °C (96.7 °F) (Temporal)   Ht 1.727 m (5' 8\")   Wt 99.3 kg (219 lb)   BMI 33.30 kg/m²     General: No apparent distress  Abdomen: Soft, nontender, no rebound, no guarding, no peritoneal signs, Steri-Strips were removed, incision is closed along the entire length to sterile probe, the ecchymotic dark tissue midway along the posterior edge of her incision seems to have blistered and popped under the Steri-Strips, this is likely the fluid that she was seen on her dressing, there is mild erythema around this area but no induration and no malodor, overall I do not believe it is infected but she did have a skin reaction to the adhesive in this area and the new layer of skin underneath needs to heal free of infection; there is surrounding yellow ecchymosis but seems to be resolving, the possible cautery injury above her incision seems to be healing as well, photograph taken of the incision with the patient's phone for her to see  Extremities: No edema    Assessment:  POD #8 s/p RLTCS  Blistering skin reaction along her posterior edge of her incision    Plan:  Patient " instructed to keep her incision clean and dry with soap and water.  No rubbing.  She may use Neosporin but no peroxide.  I showed her along the image how the skin had blistered and opened up.  We discussed signs of infection to watch out for including increasing redness, tenderness, malodorous discharge, or firmness to her incision.  None of that is evident currently.  She does need close follow-up.  Patient to get into clinic on Monday at the latest.  All questions answered.    Trudy South M.D.

## 2023-12-28 NOTE — PROGRESS NOTES
Pt post- section delivered , presenting today for drainage from surgical incision. Pt states she was seen in office on Tuesday and given anti-fungal powder for this, but has noticed an increase in discharge. Report to Dr South. Dr South in room to evaluate incision. Order received to discharge pt and have pt schedule follow up in office by Monday. Discharge instructions given, pt verbalized understanding.

## 2024-01-08 ENCOUNTER — HOSPITAL ENCOUNTER (EMERGENCY)
Facility: MEDICAL CENTER | Age: 29
End: 2024-01-09
Attending: EMERGENCY MEDICINE
Payer: COMMERCIAL

## 2024-01-08 ENCOUNTER — APPOINTMENT (OUTPATIENT)
Dept: RADIOLOGY | Facility: MEDICAL CENTER | Age: 29
End: 2024-01-08
Attending: EMERGENCY MEDICINE
Payer: COMMERCIAL

## 2024-01-08 DIAGNOSIS — R51.9 ACUTE NONINTRACTABLE HEADACHE, UNSPECIFIED HEADACHE TYPE: ICD-10-CM

## 2024-01-08 DIAGNOSIS — M54.2 NECK PAIN: ICD-10-CM

## 2024-01-08 LAB
ALBUMIN SERPL BCP-MCNC: 4 G/DL (ref 3.2–4.9)
ALBUMIN/GLOB SERPL: 1.1 G/DL
ALP SERPL-CCNC: 72 U/L (ref 30–99)
ALT SERPL-CCNC: 27 U/L (ref 2–50)
ANION GAP SERPL CALC-SCNC: 13 MMOL/L (ref 7–16)
AST SERPL-CCNC: 29 U/L (ref 12–45)
BASOPHILS # BLD AUTO: 0.4 % (ref 0–1.8)
BASOPHILS # BLD: 0.03 K/UL (ref 0–0.12)
BILIRUB SERPL-MCNC: 0.2 MG/DL (ref 0.1–1.5)
BUN SERPL-MCNC: 13 MG/DL (ref 8–22)
CALCIUM ALBUM COR SERPL-MCNC: 9.1 MG/DL (ref 8.5–10.5)
CALCIUM SERPL-MCNC: 9.1 MG/DL (ref 8.5–10.5)
CHLORIDE SERPL-SCNC: 106 MMOL/L (ref 96–112)
CO2 SERPL-SCNC: 22 MMOL/L (ref 20–33)
CREAT SERPL-MCNC: 0.7 MG/DL (ref 0.5–1.4)
EOSINOPHIL # BLD AUTO: 0.09 K/UL (ref 0–0.51)
EOSINOPHIL NFR BLD: 1.2 % (ref 0–6.9)
ERYTHROCYTE [DISTWIDTH] IN BLOOD BY AUTOMATED COUNT: 41.6 FL (ref 35.9–50)
GFR SERPLBLD CREATININE-BSD FMLA CKD-EPI: 121 ML/MIN/1.73 M 2
GLOBULIN SER CALC-MCNC: 3.6 G/DL (ref 1.9–3.5)
GLUCOSE SERPL-MCNC: 94 MG/DL (ref 65–99)
HCT VFR BLD AUTO: 38.1 % (ref 37–47)
HGB BLD-MCNC: 12.3 G/DL (ref 12–16)
IMM GRANULOCYTES # BLD AUTO: 0.01 K/UL (ref 0–0.11)
IMM GRANULOCYTES NFR BLD AUTO: 0.1 % (ref 0–0.9)
LYMPHOCYTES # BLD AUTO: 3.25 K/UL (ref 1–4.8)
LYMPHOCYTES NFR BLD: 45 % (ref 22–41)
MCH RBC QN AUTO: 27.2 PG (ref 27–33)
MCHC RBC AUTO-ENTMCNC: 32.3 G/DL (ref 32.2–35.5)
MCV RBC AUTO: 84.1 FL (ref 81.4–97.8)
MONOCYTES # BLD AUTO: 0.3 K/UL (ref 0–0.85)
MONOCYTES NFR BLD AUTO: 4.1 % (ref 0–13.4)
NEUTROPHILS # BLD AUTO: 3.55 K/UL (ref 1.82–7.42)
NEUTROPHILS NFR BLD: 49.2 % (ref 44–72)
NRBC # BLD AUTO: 0 K/UL
NRBC BLD-RTO: 0 /100 WBC (ref 0–0.2)
PLATELET # BLD AUTO: 350 K/UL (ref 164–446)
PMV BLD AUTO: 10.8 FL (ref 9–12.9)
POTASSIUM SERPL-SCNC: 3.7 MMOL/L (ref 3.6–5.5)
PROT SERPL-MCNC: 7.6 G/DL (ref 6–8.2)
RBC # BLD AUTO: 4.53 M/UL (ref 4.2–5.4)
SODIUM SERPL-SCNC: 141 MMOL/L (ref 135–145)
WBC # BLD AUTO: 7.2 K/UL (ref 4.8–10.8)

## 2024-01-08 PROCEDURE — 85025 COMPLETE CBC W/AUTO DIFF WBC: CPT

## 2024-01-08 PROCEDURE — 99284 EMERGENCY DEPT VISIT MOD MDM: CPT

## 2024-01-08 PROCEDURE — 80053 COMPREHEN METABOLIC PANEL: CPT

## 2024-01-08 PROCEDURE — 36415 COLL VENOUS BLD VENIPUNCTURE: CPT

## 2024-01-08 PROCEDURE — 96374 THER/PROPH/DIAG INJ IV PUSH: CPT

## 2024-01-08 PROCEDURE — 700117 HCHG RX CONTRAST REV CODE 255: Performed by: EMERGENCY MEDICINE

## 2024-01-08 RX ORDER — MORPHINE SULFATE 4 MG/ML
4 INJECTION INTRAVENOUS ONCE
Status: DISCONTINUED | OUTPATIENT
Start: 2024-01-08 | End: 2024-01-09 | Stop reason: HOSPADM

## 2024-01-08 RX ADMIN — IOHEXOL 80 ML: 350 INJECTION, SOLUTION INTRAVENOUS at 23:58

## 2024-01-08 ASSESSMENT — FIBROSIS 4 INDEX: FIB4 SCORE: 0.51

## 2024-01-09 VITALS
RESPIRATION RATE: 19 BRPM | TEMPERATURE: 98.1 F | BODY MASS INDEX: 32.18 KG/M2 | OXYGEN SATURATION: 98 % | HEIGHT: 68 IN | SYSTOLIC BLOOD PRESSURE: 126 MMHG | WEIGHT: 212.3 LBS | HEART RATE: 68 BPM | DIASTOLIC BLOOD PRESSURE: 78 MMHG

## 2024-01-09 PROCEDURE — 700111 HCHG RX REV CODE 636 W/ 250 OVERRIDE (IP): Mod: UD | Performed by: EMERGENCY MEDICINE

## 2024-01-09 PROCEDURE — 70498 CT ANGIOGRAPHY NECK: CPT

## 2024-01-09 PROCEDURE — 70496 CT ANGIOGRAPHY HEAD: CPT

## 2024-01-09 RX ORDER — KETOROLAC TROMETHAMINE 30 MG/ML
15 INJECTION, SOLUTION INTRAMUSCULAR; INTRAVENOUS ONCE
Status: COMPLETED | OUTPATIENT
Start: 2024-01-09 | End: 2024-01-09

## 2024-01-09 RX ADMIN — KETOROLAC TROMETHAMINE 15 MG: 30 INJECTION, SOLUTION INTRAMUSCULAR; INTRAVENOUS at 01:34

## 2024-01-09 NOTE — ED NOTES
Pt complaints that pain started as frontal headache and pain moves around- sometimes to back of head and sometimes down into neck and shoulders. Pt also complains that she has been having acid reflux- states she is taking omeprazole with no relief. Ambulatory with steady gait to exam room

## 2024-01-09 NOTE — ED TRIAGE NOTES
"Chief Complaint   Patient presents with    Neck Pain     Patient had  2 weeks ago on 23. Patient complaining of pain in her head, neck and bilateral shoulders since she was discharged. Patient states that pain started in her head, and now is migrating down to shoulders.        Pt is alert and oriented, speaking in full sentences, follows commands and responds appropriately to questions. Resperations are even and unlabored.      Pt placed in lobby. Pt educated on triage process. Pt encouraged to alert staff for any changes.     Patient and staff wearing appropriate PPE.    /81   Pulse 94   Temp 36.8 °C (98.3 °F) (Temporal)   Resp 18   Ht 1.727 m (5' 8\")   Wt 96.3 kg (212 lb 4.9 oz)   SpO2 98%    "

## 2024-01-09 NOTE — ED PROVIDER NOTES
"                                                        ED Provider Note    CHIEF COMPLAINT  Chief Complaint   Patient presents with    Neck Pain     Patient had  2 weeks ago on 23. Patient complaining of pain in her head, neck and bilateral shoulders since she was discharged. Patient states that pain started in her head, and now is migrating down to shoulders.         HPI    Primary care provider: Pcp Pt States None   History obtained from: Patient  History limited by: None     Rain CHE Rosado is a 29 y.o. female who presents to the ED complaining of headache and neck pain.  Patient had  on  and reports that she has had headache ever since giving birth.  She states that she did not notify the doctor but did tell the nurses.  For the past week, she also has had pain radiating down her left neck into her left shoulder and upper arm.  No injury or trauma.  She has been using over-the-counter pain medicine and reports improvement today but the pain returns.  She is not breast-feeding.  She denies fever/chills/congestion/sore throat/cough/shortness of breath or difficulty breathing/nausea/vomiting/diarrhea/dysuria.  She has had intermittent vaginal bleeding but reports it is improving.  She denies any other pain except for \"heartburn.\"  No rash.  She denies visual change/weakness or sensory change.  Patient would like to have CT scan.    REVIEW OF SYSTEMS  Please see HPI for pertinent positives/negatives.  All other systems reviewed and are negative.     PAST MEDICAL HISTORY  Past Medical History:   Diagnosis Date    Pregnant 2023    repeat c/section scheduled on 2023    Pre-eclampsia in third trimester 2019    Anxiety     sob with anxiety only        SURGICAL HISTORY  Past Surgical History:   Procedure Laterality Date    REPEAT C SECTOIN WITH SALPINGECTOMY N/A 2023    Procedure: REPEAT  SECTION WITH BILATERAL SALPINGECTOMY;  Surgeon: Corinne E " "MEENA Scott;  [facility];  Service: Labor and Delivery    REPEAT C SECTION  2019    Procedure:  SECTION, REPEAT;  Surgeon: Corinne E Capurro, M.D.;  Location: LAB[facility] DELIVERY;  Service: Labor and Delivery    OTHER      c/section x 3        SOCIAL HISTORY  Social History     Tobacco Use    Smoking status: Never     Passive exposure: Never    Smokeless tobacco: Never   Vaping Use    Vaping Use: Never used   Substance and Sexual Activity    Alcohol use: Never    Drug use: Never    Sexual activity: Not Currently     Partners: Male     Birth control/protection: Condom        FAMILY HISTORY  Family History   Problem Relation Age of Onset    Diabetes Father     Diabetes Paternal Aunt     Diabetes Maternal Grandmother     Heart Disease Maternal Grandfather         CURRENT MEDICATIONS  Home Medications       Reviewed by Fam Barrow R.N. (Registered Nurse) on 24 at   Med List Status: Not Addressed     Medication Last Dose Status   ibuprofen (MOTRIN) 800 MG Tab  Active   nystatin (MYCOSTATIN) powder  Active                     ALLERGIES  No Known Allergies     PHYSICAL EXAM  VITAL SIGNS: /78   Pulse 68   Temp 36.7 °C (98.1 °F) (Temporal)   Resp 19   Ht 1.727 m (5' 8\")   Wt 96.3 kg (212 lb 4.9 oz)   SpO2 98%   Breastfeeding No   BMI 32.28 kg/m²  @AMERICO[[id number]::@     Pulse ox interpretation: 98% I interpret this pulse ox as normal     Constitutional: Well developed, well nourished, alert in no apparent distress, nontoxic appearance    HENT: No external signs of trauma, normocephalic, oropharynx moist and clear   Eyes: PERRL, EOMI, vision and visual fields are grossly intact bilaterally, conjunctiva without erythema, no discharge, no icterus    Neck: Soft and supple, trachea midline, no stridor, no midline tenderness, tenderness along left paraspinal/trapezius, no LAD, good ROM    Cardiovascular: Regular rate and rhythm, no murmurs/rubs/gallops, strong distal " pulses and good perfusion    Thorax & Lungs: No respiratory distress, CTAB    Abdomen: Soft, nontender, nondistended, no guarding, no rebound, normal BS    Back: No CVAT     Extremities: No cyanosis, no edema, no gross deformity, good ROM, intact distal pulses with brisk cap refill    Skin: Warm, dry, no pallor/cyanosis, no rash noted      Neuro: Alert and oriented to person, place, and time.  GCS 15.  CN II-XII grossly intact.  Normal speech.  Equal strength bilateral UE/LE.  Sensation intact to touch.  No cerebellar signs.  Normal gait.    Psychiatric: Cooperative, normal mood and affect      DIAGNOSTIC STUDIES / PROCEDURES        LABS  All labs reviewed by me.     Results for orders placed or performed during the hospital encounter of 01/08/24   CBC WITH DIFFERENTIAL   Result Value Ref Range    WBC 7.2 4.8 - 10.8 K/uL    RBC 4.53 4.20 - 5.40 M/uL    Hemoglobin 12.3 12.0 - 16.0 g/dL    Hematocrit 38.1 37.0 - 47.0 %    MCV 84.1 81.4 - 97.8 fL    MCH 27.2 27.0 - 33.0 pg    MCHC 32.3 32.2 - 35.5 g/dL    RDW 41.6 35.9 - 50.0 fL    Platelet Count 350 164 - 446 K/uL    MPV 10.8 9.0 - 12.9 fL    Neutrophils-Polys 49.20 44.00 - 72.00 %    Lymphocytes 45.00 (H) 22.00 - 41.00 %    Monocytes 4.10 0.00 - 13.40 %    Eosinophils 1.20 0.00 - 6.90 %    Basophils 0.40 0.00 - 1.80 %    Immature Granulocytes 0.10 0.00 - 0.90 %    Nucleated RBC 0.00 0.00 - 0.20 /100 WBC    Neutrophils (Absolute) 3.55 1.82 - 7.42 K/uL    Lymphs (Absolute) 3.25 1.00 - 4.80 K/uL    Monos (Absolute) 0.30 0.00 - 0.85 K/uL    Eos (Absolute) 0.09 0.00 - 0.51 K/uL    Baso (Absolute) 0.03 0.00 - 0.12 K/uL    Immature Granulocytes (abs) 0.01 0.00 - 0.11 K/uL    NRBC (Absolute) 0.00 K/uL   COMP METABOLIC PANEL   Result Value Ref Range    Sodium 141 135 - 145 mmol/L    Potassium 3.7 3.6 - 5.5 mmol/L    Chloride 106 96 - 112 mmol/L    Co2 22 20 - 33 mmol/L    Anion Gap 13.0 7.0 - 16.0    Glucose 94 65 - 99 mg/dL    Bun 13 8 - 22 mg/dL    Creatinine 0.70 0.50 -  1.40 mg/dL    Calcium 9.1 8.5 - 10.5 mg/dL    Correct Calcium 9.1 8.5 - 10.5 mg/dL    AST(SGOT) 29 12 - 45 U/L    ALT(SGPT) 27 2 - 50 U/L    Alkaline Phosphatase 72 30 - 99 U/L    Total Bilirubin 0.2 0.1 - 1.5 mg/dL    Albumin 4.0 3.2 - 4.9 g/dL    Total Protein 7.6 6.0 - 8.2 g/dL    Globulin 3.6 (H) 1.9 - 3.5 g/dL    A-G Ratio 1.1 g/dL   ESTIMATED GFR   Result Value Ref Range    GFR (CKD-EPI) 121 >60 mL/min/1.73 m 2        RADIOLOGY  I have independently interpreted the diagnostic imaging associated with this visit and am waiting the final reading from the radiologist.   My preliminary interpretation is as follows: No dissection or bleed.    CT-CTA NECK WITH & W/O-POST PROCESSING   Final Result         1.  CT angiogram of the neck within normal limits.   2.  Pulmonary nodule, see nodule follow-up recommendations below.      Fleischner Society pulmonary nodule recommendations:   Low Risk: No routine follow-up      High Risk: Optional CT at 12 months      Comments: Nodules less than 6 mm do not require routine follow-up, but certain patients at high risk with suspicious nodule morphology, upper lobe location, or both may warrant 12-month follow-up.      Low Risk - Minimal or absent history of smoking and of other known risk factors.      High Risk - History of smoking or of other known risk factors.      Note: These recommendations do not apply to lung cancer screening, patients with immunosuppression, or patients with known primary cancer.      Fleischner Society 2017 Guidelines for Management of Incidentally Detected Pulmonary Nodules in Adults            CT-CTA HEAD WITH & W/O-POST PROCESS   Final Result         1.  No large vessel occlusion or aneurysm identified             COURSE & MEDICAL DECISION MAKING  Nursing notes, VS, PMSFHx reviewed in chart.     Review of past medical records shows the patient was admitted to this hospital December 20, 2023 for induction of labor and discharged on December 22, 2023.   Patient contacted the office on 2024 regarding postpartum depression.      Differential diagnoses considered include but are not limited to: Tension/migraine/cluster HA, intracranial hemorrhage/SAH, aneurysm, dissection, intracranial HTN, central venous thrombosis, tumor/cancer, strain/sprain      ED Observation Status? Yes; I am placing the patient in to an observation status due to a diagnostic uncertainty as well as therapeutic intensity. Patient placed in observation status at 10:51 PM, 2024.     Observation plan is as follows: We will obtain laboratory and imaging studies and monitor patient in the ED.    Upon Reevaluation, the patient's condition has: Remained stable and will be discharged.    Patient discharged from ED Observation status at 0128 on 2024.       INITIAL ASSESSMENT AND PLAN  Care Narrative: This is a 29-year-old female patient with history of anxiety, preeclampsia and recent  delivery who presents to the ED complaining of persistent headache since delivery and also neck pain.  Will obtain CTA to rule out dissection or bleed.  Patient be treated with pain medicine and closely monitored in the ED.      Discussion of management with other QHP or appropriate source(s): None     Escalation of care considered, and ultimately not performed: acute inpatient care management, however at this time, the patient is most appropriate for outpatient management.     Barriers to care at this time, including but not limited to: Patient does not have established PCP.     Decision tools and prescription drugs considered including, but not limited to: Pain Medications   .        History and physical exam as above.  CTA of head and neck without acute findings as above.  Laboratory testing unrevealing and reassuring.  Patient declined morphine because she will be driving.  She will be given a dose of Toradol prior to discharge.  I discussed the findings with the patient.  This is an  alert and pleasant patient clinically stable during her ED stay in no acute distress and nontoxic in appearance.  Her exam continues to remain benign and without focal neurological findings.  She reports that her headache and neck pain is not severe and I doubt that this is due to emergent pathology such as CVA, meningitis, venous thrombosis and CTA without evidence for dissection or bleed.  I discussed with her supportive home care, outpatient follow-up and return to ED precautions.  Patient verbalized understanding and agreed with plan of care with no further questions or concerns.      The patient is referred to a primary physician for blood pressure management, diabetic screening, and for all other preventative health concerns.       FINAL IMPRESSION  1. Acute nonintractable headache, unspecified headache type Acute   2. Neck pain Acute          DISPOSITION  Patient will be discharged home in stable condition.       FOLLOW UP  [facility]  [id number]  [facility] [address]  [phone]  Call today      [facility]  [address]  [facility] [address]  [phone]  Call today      [facility], Emergency Dept  [address] [facility] [id number] [phone]    If symptoms worsen         OUTPATIENT MEDICATIONS  Discharge Medication List as of 1/9/2024  1:38 AM             Electronically signed by: Jose Mckinnon D.O., 1/8/2024 10:50 PM      Portions of this record were made with voice recognition software.  Despite my review, errors may remain.  Please interpret this chart in the appropriate context.

## 2024-01-13 ENCOUNTER — OFFICE VISIT (OUTPATIENT)
Dept: URGENT CARE | Facility: CLINIC | Age: 29
End: 2024-01-13
Payer: COMMERCIAL

## 2024-01-13 VITALS
BODY MASS INDEX: 31.57 KG/M2 | SYSTOLIC BLOOD PRESSURE: 102 MMHG | TEMPERATURE: 98.3 F | OXYGEN SATURATION: 98 % | RESPIRATION RATE: 14 BRPM | HEART RATE: 92 BPM | DIASTOLIC BLOOD PRESSURE: 70 MMHG | WEIGHT: 208.3 LBS | HEIGHT: 68 IN

## 2024-01-13 DIAGNOSIS — K04.7 DENTAL INFECTION: ICD-10-CM

## 2024-01-13 PROCEDURE — 3078F DIAST BP <80 MM HG: CPT | Performed by: NURSE PRACTITIONER

## 2024-01-13 PROCEDURE — RXMED WILLOW AMBULATORY MEDICATION CHARGE: Performed by: NURSE PRACTITIONER

## 2024-01-13 PROCEDURE — 3074F SYST BP LT 130 MM HG: CPT | Performed by: NURSE PRACTITIONER

## 2024-01-13 PROCEDURE — 99213 OFFICE O/P EST LOW 20 MIN: CPT | Performed by: NURSE PRACTITIONER

## 2024-01-13 RX ORDER — IBUPROFEN 600 MG/1
600 TABLET ORAL EVERY 6 HOURS PRN
Qty: 30 TABLET | Refills: 0 | Status: SHIPPED | OUTPATIENT
Start: 2024-01-13 | End: 2024-01-16

## 2024-01-13 RX ORDER — AMOXICILLIN AND CLAVULANATE POTASSIUM 875; 125 MG/1; MG/1
1 TABLET, FILM COATED ORAL 2 TIMES DAILY
Qty: 20 TABLET | Refills: 0 | Status: SHIPPED | OUTPATIENT
Start: 2024-01-13 | End: 2024-01-16

## 2024-01-13 ASSESSMENT — ENCOUNTER SYMPTOMS
FEVER: 0
SHORTNESS OF BREATH: 0

## 2024-01-13 ASSESSMENT — FIBROSIS 4 INDEX: FIB4 SCORE: 0.45

## 2024-01-13 NOTE — PATIENT INSTRUCTIONS
-Oral hydration.  -Ice pack for pain and swelling.  -Tylenol and NSAIDs (Ibuprofen) for pain and inflammation.  -Dental Hygiene  -Reduce sugar-rich foods or beverages.     Follow up with dentist. Follow up urgently for worsening symptoms or increased signs of infection (redness, pus, increased pain, increased swelling or fever). Emergently for swelling to neck or throat, difficulty swallowing, difficulty opening mouth, shortness of breath.

## 2024-01-13 NOTE — PROGRESS NOTES
Subjective:     Rain Rosado is a 29 y.o. female who presents for Tooth Ache (Pt has upper right tooth pain x 1 week )      Presents for upper right dental pain. Has a history of cavity in the area, with a  root canal a while ago. Reports tooth decay, and bleeding.     Dental Pain   This is a new problem. The current episode started in the past 7 days. The pain is at a severity of 6/10. Pertinent negatives include no difficulty swallowing or fever. She has tried nothing for the symptoms.       Past Medical History:   Diagnosis Date    Anxiety     sob with anxiety only    Pre-eclampsia in third trimester 2019    Pregnant 2023    repeat c/section scheduled on 2023       Past Surgical History:   Procedure Laterality Date    REPEAT C SECTOIN WITH SALPINGECTOMY N/A 2023    Procedure: REPEAT  SECTION WITH BILATERAL SALPINGECTOMY;  Surgeon: Corinne E Capurro, M.D.;  Location: [facility];  Service: Labor and Delivery    REPEAT C SECTION  2019    Procedure:  SECTION, REPEAT;  Surgeon: Corinne E Capurro, M.D.;  Location: [facility] DEL[facility];  Service: Labor and Delivery    OTHER      c/section x 3       Social History     Socioeconomic History    Marital status: Single     Spouse name: Not on file    Number of children: Not on file    Years of education: Not on file    Highest education level: Not on file   Occupational History    Not on file   Tobacco Use    Smoking status: Never     Passive exposure: Never    Smokeless tobacco: Never   Vaping Use    Vaping Use: Never used   Substance and Sexual Activity    Alcohol use: Never    Drug use: Never    Sexual activity: Not Currently     Partners: Male     Birth control/protection: Condom   Other Topics Concern    Not on file   Social History Narrative    Not on file     Social Determinants of Health     Financial Resource Strain: Not on file   Food Insecurity: No Food Insecurity (2019)    Hunger Vital  "Sign     Worried About Running Out of Food in the Last Year: Never true     Ran Out of Food in the Last Year: Never true   Transportation Needs: No Transportation Needs (12/26/2019)    PRAPARE - Transportation     Lack of Transportation (Medical): No     Lack of Transportation (Non-Medical): No   Physical Activity: Not on file   Stress: Not on file   Social Connections: Not on file   Intimate Partner Violence: Not on file   Housing Stability: Not on file        Family History   Problem Relation Age of Onset    Diabetes Father     Diabetes Paternal Aunt     Diabetes Maternal Grandmother     Heart Disease Maternal Grandfather         No Known Allergies    Review of Systems   Constitutional:  Negative for fever.   Respiratory:  Negative for shortness of breath.    All other systems reviewed and are negative.       Objective:   /70 (BP Location: Right arm, Patient Position: Sitting, BP Cuff Size: Large adult)   Pulse 92   Temp 36.8 °C (98.3 °F) (Temporal)   Resp 14   Ht 1.727 m (5' 8\")   Wt 94.5 kg (208 lb 4.8 oz)   SpO2 98%   BMI 31.67 kg/m²     Physical Exam  Vitals reviewed.   Constitutional:       General: She is not in acute distress.     Appearance: She is not toxic-appearing.   HENT:      Nose:      Right Sinus: No maxillary sinus tenderness.      Mouth/Throat:      Lips: Pink.      Mouth: Mucous membranes are moist.      Dentition: Dental tenderness and dental caries present.      Pharynx: Oropharynx is clear.   Cardiovascular:      Rate and Rhythm: Normal rate.   Pulmonary:      Effort: Pulmonary effort is normal. No respiratory distress.   Musculoskeletal:      Cervical back: No edema or crepitus.   Neurological:      Mental Status: She is alert and oriented to person, place, and time.         Assessment/Plan:   1. Dental infection  - amoxicillin-clavulanate (AUGMENTIN) 875-125 MG Tab; Take 1 Tablet by mouth 2 times a day for 10 days.  Dispense: 20 Tablet; Refill: 0  - ibuprofen (MOTRIN) 600 MG " Tab; Take 1 Tablet by mouth every 6 hours as needed for Inflammation or Moderate Pain.  Dispense: 30 Tablet; Refill: 0    -Oral hydration.  -Ice pack for pain and swelling.  -Tylenol and NSAIDs (Ibuprofen) for pain and inflammation.  -Dental Hygiene  -Reduce sugar-rich foods or beverages.     Follow up with dentist. Follow up urgently for worsening symptoms or increased signs of infection (redness, pus, increased pain, increased swelling or fever). Emergently for swelling to neck or throat, difficulty swallowing, difficulty opening mouth, shortness of breath.    -Afebrile. Clear airway.    Differential diagnosis, natural history, supportive care, and indications for immediate follow-up discussed.

## 2024-01-14 ENCOUNTER — PHARMACY VISIT (OUTPATIENT)
Dept: PHARMACY | Facility: MEDICAL CENTER | Age: 29
End: 2024-01-14
Payer: MEDICARE

## 2024-01-14 PROCEDURE — RXOTC WILLOW AMBULATORY OTC CHARGE: Performed by: PHARMACIST

## 2024-01-16 ENCOUNTER — OFFICE VISIT (OUTPATIENT)
Dept: MEDICAL GROUP | Facility: MEDICAL CENTER | Age: 29
End: 2024-01-16
Payer: COMMERCIAL

## 2024-01-16 VITALS
HEIGHT: 68 IN | DIASTOLIC BLOOD PRESSURE: 90 MMHG | TEMPERATURE: 97.3 F | BODY MASS INDEX: 32.1 KG/M2 | OXYGEN SATURATION: 100 % | WEIGHT: 211.8 LBS | SYSTOLIC BLOOD PRESSURE: 118 MMHG | RESPIRATION RATE: 16 BRPM | HEART RATE: 105 BPM

## 2024-01-16 DIAGNOSIS — K21.9 GASTROESOPHAGEAL REFLUX DISEASE, UNSPECIFIED WHETHER ESOPHAGITIS PRESENT: ICD-10-CM

## 2024-01-16 DIAGNOSIS — F41.9 ANXIETY: ICD-10-CM

## 2024-01-16 PROCEDURE — 99213 OFFICE O/P EST LOW 20 MIN: CPT

## 2024-01-16 PROCEDURE — 99214 OFFICE O/P EST MOD 30 MIN: CPT

## 2024-01-16 PROCEDURE — 3074F SYST BP LT 130 MM HG: CPT

## 2024-01-16 PROCEDURE — RXMED WILLOW AMBULATORY MEDICATION CHARGE

## 2024-01-16 PROCEDURE — 3080F DIAST BP >= 90 MM HG: CPT

## 2024-01-16 RX ORDER — OMEPRAZOLE 20 MG/1
20 CAPSULE, DELAYED RELEASE ORAL DAILY
Qty: 30 CAPSULE | Refills: 2 | Status: SHIPPED | OUTPATIENT
Start: 2024-01-16

## 2024-01-16 RX ORDER — OMEPRAZOLE 20 MG/1
20 CAPSULE, DELAYED RELEASE ORAL DAILY
COMMUNITY

## 2024-01-16 RX ORDER — ESCITALOPRAM OXALATE 10 MG/1
10 TABLET ORAL DAILY
Qty: 30 TABLET | Refills: 1 | Status: SHIPPED | OUTPATIENT
Start: 2024-01-16

## 2024-01-16 ASSESSMENT — ANXIETY QUESTIONNAIRES
3. WORRYING TOO MUCH ABOUT DIFFERENT THINGS: MORE THAN HALF THE DAYS
5. BEING SO RESTLESS THAT IT IS HARD TO SIT STILL: NOT AT ALL
5. BEING SO RESTLESS THAT IT IS HARD TO SIT STILL: NOT AT ALL
4. TROUBLE RELAXING: SEVERAL DAYS
2. NOT BEING ABLE TO STOP OR CONTROL WORRYING: MORE THAN HALF THE DAYS
7. FEELING AFRAID AS IF SOMETHING AWFUL MIGHT HAPPEN: SEVERAL DAYS
IF YOU CHECKED OFF ANY PROBLEMS ON THIS QUESTIONNAIRE, HOW DIFFICULT HAVE THESE PROBLEMS MADE IT FOR YOU TO DO YOUR WORK, TAKE CARE OF THINGS AT HOME, OR GET ALONG WITH OTHER PEOPLE: SOMEWHAT DIFFICULT
3. WORRYING TOO MUCH ABOUT DIFFERENT THINGS: MORE THAN HALF THE DAYS
1. FEELING NERVOUS, ANXIOUS, OR ON EDGE: SEVERAL DAYS
1. FEELING NERVOUS, ANXIOUS, OR ON EDGE: SEVERAL DAYS
4. TROUBLE RELAXING: SEVERAL DAYS
7. FEELING AFRAID AS IF SOMETHING AWFUL MIGHT HAPPEN: SEVERAL DAYS
2. NOT BEING ABLE TO STOP OR CONTROL WORRYING: MORE THAN HALF THE DAYS
GAD7 TOTAL SCORE: 9
6. BECOMING EASILY ANNOYED OR IRRITABLE: MORE THAN HALF THE DAYS

## 2024-01-16 ASSESSMENT — FIBROSIS 4 INDEX: FIB4 SCORE: 0.45

## 2024-01-16 ASSESSMENT — PATIENT HEALTH QUESTIONNAIRE - PHQ9
CLINICAL INTERPRETATION OF PHQ2 SCORE: 2
SUM OF ALL RESPONSES TO PHQ QUESTIONS 1-9: 8
5. POOR APPETITE OR OVEREATING: 1 - SEVERAL DAYS

## 2024-01-16 NOTE — PROGRESS NOTES
Subjective:     CC:  Diagnoses of Anxiety and Gastroesophageal reflux disease, unspecified whether esophagitis present were pertinent to this visit.    HISTORY OF THE PRESENT ILLNESS: Patient is a 29 y.o. female. This pleasant patient is here today to establish care.    Problem   Gerd (Gastroesophageal Reflux Disease)    Patient reports that since she has been having reflux for over one week. She has tried tums and omprazole. She reports that it occasionally works.  Symptoms: reflux pain, tastes acid, throat burning  Triggers: ibuprofen  Current med: omeprazole 20 daily  Side effects: none  Effective: yes    Lifestyle factors  Eat within 2 hours of bed: yes  Avoiding triggers: yes  Limiting EtOH:  yes  Avoiding tobacco:  yes  Avoiding laying down w/in 3-4 hrs of meal:  yes       Anxiety    Patient reports that she previously had post partum anxiety and now feel that she is experiencing it again. She reports that she is sleeping ok. She denies any thoughts of harming herself or others. She reports that when she she feels increasingly anxious she takes the hydroxyzine but it makes her tired. She is not currently breast feeding. She would like to start taking a daily medication. She has not tried any medications is in the past.          Health Maintenance: reviewed and completed per patient preference.     ROS:   - CONSTITUTIONAL: Denies weight loss, fever and chills.  - HEENT: Denies changes in vision and hearing.  - RESPIRATORY: Denies SOB and cough.  - CV: Denies palpitations and CP.  - GI: Denies abdominal pain, nausea, vomiting and diarrhea. +acid taste in mouth and throat pain  - : Denies dysuria and urinary frequency.  - MSK: Denies myalgia and joint pain.  - SKIN: Denies rash and pruritus.  - NEUROLOGICAL: Denies headache and syncope.  - PSYCHIATRIC: Denies recent changes in mood. Denies anxiety and depression.           Social History     Socioeconomic History    Marital status: Single     Spouse name: Not  "on file    Number of children: Not on file    Years of education: Not on file    Highest education level: Not on file   Occupational History    Not on file   Tobacco Use    Smoking status: Never     Passive exposure: Never    Smokeless tobacco: Never   Vaping Use    Vaping Use: Never used   Substance and Sexual Activity    Alcohol use: Never    Drug use: Never    Sexual activity: Not Currently     Partners: Male     Birth control/protection: Condom   Other Topics Concern    Not on file   Social History Narrative    Not on file     Social Determinants of Health     Financial Resource Strain: Not on file   Food Insecurity: No Food Insecurity (12/26/2019)    Hunger Vital Sign     Worried About Running Out of Food in the Last Year: Never true     Ran Out of Food in the Last Year: Never true   Transportation Needs: No Transportation Needs (12/26/2019)    PRAPARE - Transportation     Lack of Transportation (Medical): No     Lack of Transportation (Non-Medical): No   Physical Activity: Not on file   Stress: Not on file   Social Connections: Not on file   Intimate Partner Violence: Not on file   Housing Stability: Not on file      No Known Allergies         Current Outpatient Medications:     omeprazole, 20 mg, Oral, DAILY    HYDROXYZINE HCL PO, Take  by mouth.   Objective:     Exam: BP (!) 118/90 (BP Location: Right arm, Patient Position: Sitting, BP Cuff Size: Adult)   Pulse (!) 105   Temp 36.3 °C (97.3 °F) (Temporal)   Resp 16   Ht 1.727 m (5' 8\")   Wt 96.1 kg (211 lb 12.8 oz)   SpO2 100%  Body mass index is 32.2 kg/m².    Physical Exam:  Constitutional: Alert, no distress, well-groomed.  Skin: Warm, dry, good turgor, no rashes in visible areas.  Eye: Equal, round and reactive, conjunctiva clear, lids normal.  ENMT: Lips without lesions, good dentition, moist mucous membranes.  Neck: Trachea midline, no masses, no thyromegaly.  Respiratory: Unlabored respiratory effort, no cough.  Abd: soft, non tender, non " distended, normal BS  MSK: Normal gait, moves all extremities.  Neuro: Grossly non-focal.   Psych: Alert and oriented x3, normal affect and mood.  ARMIDA-7 Questionnaire    Feeling nervous, anxious, or on edge: Several days  Not being able to sop or control worrying: More than half the days  Worrying too much about different things: More than half the days  Trouble relaxing: Several days  Being so restless that it's hard to sit still: Not at all  Becoming easily annoyed or irritable: More than half the days  Feeling afraid as if something awful might happen: Several days  Total: 9    Interpretation of ARMIDA 7 Total Score   Score Severity :  0-4 No Anxiety   5-9 Mild Anxiety  10-14 Moderate Anxiety  15-21 Severe Anxiety    Depression Screening    Little interest or pleasure in doing things?  1 - several days   Feeling down, depressed , or hopeless? 1 - several days   Trouble falling or staying asleep, or sleeping too much?  1 - several days   Feeling tired or having little energy?  1 - several days   Poor appetite or overeating?  1 - several days   Feeling bad about yourself - or that you are a failure or have let yourself or your family down? 1 - several days   Trouble concentrating on things, such as reading the newspaper or watching television? 1 - several days   Moving or speaking so slowly that other people could have noticed.  Or the opposite - being so fidgety or restless that you have been moving around a lot more than usual?  1 - several days   Thoughts that you would be better off dead, or of hurting yourself?  0 - not at all   Patient Health Questionnaire Score: 8       If depressive symptoms identified deferred to follow up visit unless specifically addressed in assesment and plan.    Interpretation of PHQ-9 Total Score   Score Severity   1-4 No Depression   5-9 Mild Depression   10-14 Moderate Depression   15-19 Moderately Severe Depression   20-27 Severe Depression      Labs: Reviewed    Assessment & Plan:    29 y.o. female with the following -    1. Anxiety  - Chronic condition; uncontrolled. Reviewed treatment options including medication and counseling/therapy.  - Cognitive behavioral therapy (CBT) is an effective treatment for ARMIDA as are SSRIs.  SSRIs usually take 4-6 wks to titrate to have an effect   - Counseling for stress mgmt techniques - consult placed to Behavioral Health  - Informed the patient of [employer] and the resources that are available  - Will start Lexapro after discussion of side effects with patient (including GI and sexual SE) -    - Pt to call/message in one week to report intolerance of meds and any changes in mood /symptoms  - F/u with me in four weeks or sooner as needed  - ER for SI/SA/HI  - escitalopram (LEXAPRO) 10 MG Tab; Take 1 Tablet by mouth every day.  Dispense: 30 Tablet; Refill: 1  - Referral to Psychology    2. Gastroesophageal reflux disease, unspecified whether esophagitis present  Acute, uncontrolled. She started Omeprazole OTC recently and reports that it works most of the time. The pathophysiology of reflux was discussed. Anti-reflux measures such as raising the head of the bed, avoiding tight clothing or belts, avoiding eating late at night and not lying down shortly after mealtime and achieving weight loss are discussed. Avoid ASA, NSAID's, caffeine, peppermints, alcohol and tobacco. OTC H2 blockers and/or antacids are often very helpful for PRN use. However, for chronic or daily symptoms, will give prescription of PPI's. and follow up in 4 weeks to assess efficacy of lifestyle and/or medication intervention. RTC if symptoms persist or worsen for further evaluation and treatment. Pt verbalized understanding and acknowledged treatment plan.  - omeprazole (PRILOSEC) 20 MG delayed-release capsule; Take 1 Capsule by mouth every day.  Dispense: 30 Capsule; Refill: 2    Return in about 4 weeks (around 2/13/2024) for Mood Check.    Please note that this dictation was created  using voice recognition software. I have made every reasonable attempt to correct obvious errors, but I expect that there are errors of grammar and possibly content that I did not discover before finalizing the note.

## 2024-01-19 ENCOUNTER — PHARMACY VISIT (OUTPATIENT)
Dept: PHARMACY | Facility: MEDICAL CENTER | Age: 29
End: 2024-01-19
Payer: MEDICARE

## 2024-01-19 ENCOUNTER — OFFICE VISIT (OUTPATIENT)
Dept: URGENT CARE | Facility: CLINIC | Age: 29
End: 2024-01-19
Payer: COMMERCIAL

## 2024-01-19 VITALS
DIASTOLIC BLOOD PRESSURE: 60 MMHG | TEMPERATURE: 98.2 F | HEIGHT: 68 IN | SYSTOLIC BLOOD PRESSURE: 98 MMHG | OXYGEN SATURATION: 97 % | BODY MASS INDEX: 32.28 KG/M2 | RESPIRATION RATE: 14 BRPM | WEIGHT: 213 LBS | HEART RATE: 91 BPM

## 2024-01-19 DIAGNOSIS — K59.00 CONSTIPATION, UNSPECIFIED CONSTIPATION TYPE: ICD-10-CM

## 2024-01-19 DIAGNOSIS — K64.9 HEMORRHOIDS, UNSPECIFIED HEMORRHOID TYPE: ICD-10-CM

## 2024-01-19 PROCEDURE — 3074F SYST BP LT 130 MM HG: CPT

## 2024-01-19 PROCEDURE — 3078F DIAST BP <80 MM HG: CPT

## 2024-01-19 PROCEDURE — 99213 OFFICE O/P EST LOW 20 MIN: CPT

## 2024-01-19 RX ORDER — POLYETHYLENE GLYCOL 3350 17 G/17G
17 POWDER, FOR SOLUTION ORAL DAILY
Qty: 6 EACH | Refills: 0 | Status: SHIPPED | OUTPATIENT
Start: 2024-01-19

## 2024-01-19 RX ORDER — COCOA BUTTER, PHENYLEPHRINE HYDROCHLORIDE 2211; 6.25 MG/1; MG/1
1 SUPPOSITORY RECTAL EVERY 6 HOURS PRN
Qty: 24 SUPPOSITORY | Refills: 1 | Status: SHIPPED | OUTPATIENT
Start: 2024-01-19

## 2024-01-19 ASSESSMENT — FIBROSIS 4 INDEX: FIB4 SCORE: 0.45

## 2024-01-19 NOTE — PROGRESS NOTES
Subjective:   Rain Rosado is a 29 y.o. female who presents for Hemorrhoids (X 5 days, possible hemorrhoid, blood with B.M)      HPI:    Patient presents to urgent care with concerns of hemorrhoids  Reports constipation following  of her one month old baby  Has not tried anything for constipation or hemorrhoids  Not breastfeeding  Pain with BM, bright red blood present with wiping  No fever, chills  Endorses palpable mass on rectum which is tender  Pain with sitting      ROS As above in HPI    Medications:    Current Outpatient Medications on File Prior to Visit   Medication Sig Dispense Refill    omeprazole (PRILOSEC) 20 MG delayed-release capsule Take 20 mg by mouth every day. (Patient not taking: Reported on 2024)      escitalopram (LEXAPRO) 10 MG Tab Take 1 tablet by mouth every day. (Patient not taking: Reported on 2024) 30 Tablet 1    omeprazole (PRILOSEC) 20 MG delayed-release capsule Take 1 capsule by mouth every day. (Patient not taking: Reported on 2024) 30 Capsule 2    HYDROXYZINE HCL PO Take  by mouth. (Patient not taking: Reported on 2024)       No current facility-administered medications on file prior to visit.        Allergies:   Patient has no known allergies.    Problem List:   Patient Active Problem List   Diagnosis    S/P  section    Pre-eclampsia in third trimester    Anxiety    Thoracic back pain    Chlamydia contact    Viral warts    Indication for care in labor or delivery    GERD (gastroesophageal reflux disease)        Surgical History:  Past Surgical History:   Procedure Laterality Date    REPEAT C SECTOIN WITH SALPINGECTOMY N/A 2023    Procedure: REPEAT  SECTION WITH BILATERAL SALPINGECTOMY;  Surgeon: Corinne E Capurro, M.D.;  [facility];  Service: Labor and Delivery    REPEAT C SECTION  2019    Procedure:  SECTION, REPEAT;  Surgeon: Corinne E Capurro, M.D.;  Location: LABOR AND DELIVERY;  " Service: Labor and Delivery    OTHER      c/section x 3       Past Social Hx:   Social History     Tobacco Use    Smoking status: Never     Passive exposure: Never    Smokeless tobacco: Never   Vaping Use    Vaping Use: Never used   Substance Use Topics    Alcohol use: Never    Drug use: Never          Problem list, medications, and allergies reviewed by myself today in Epic.     Objective:     BP 98/60 (BP Location: Right arm, Patient Position: Sitting, BP Cuff Size: Large adult)   Pulse 91   Temp 36.8 °C (98.2 °F) (Temporal)   Resp 14   Ht 1.727 m (5' 8\")   Wt 96.6 kg (213 lb)   SpO2 97%   BMI 32.39 kg/m²     Physical Exam  Vitals and nursing note reviewed.   Constitutional:       Appearance: Normal appearance.   HENT:      Head: Normocephalic and atraumatic.   Eyes:      Conjunctiva/sclera: Conjunctivae normal.      Pupils: Pupils are equal, round, and reactive to light.   Cardiovascular:      Rate and Rhythm: Normal rate and regular rhythm.      Heart sounds: Normal heart sounds.   Pulmonary:      Effort: Pulmonary effort is normal.      Breath sounds: Normal breath sounds.   Abdominal:      General: Bowel sounds are normal.      Palpations: Abdomen is soft.   Genitourinary:     Rectum: Tenderness and external hemorrhoid present.   Neurological:      Mental Status: She is alert and oriented to person, place, and time.         Assessment/Plan:       Diagnosis and associated orders:   1. Constipation, unspecified constipation type  - polyethylene glycol/lytes (MIRALAX) Pack; Take 1 Packet by mouth every day.  Dispense: 6 Each; Refill: 0    2. Hemorrhoids, unspecified hemorrhoid type  - witch hazel-glycerin (SOOZE) Pads; Insert 1 Each into the rectum as needed (pain).  Dispense: 100 Each; Refill: 3  - phenylephrine-cocoa butter (PREPARATION H) 0.25-88.44 % Suppos suppository; Insert 1 Suppository into the rectum every 6 hours as needed (pain).  Dispense: 24 Suppository; Refill: 1        Comments/MDM: "     Will increase dietary fiber and oral hydration, and trial miralax and laxatives to ease constipation.   Recommend sitz baths, topical preparation H products to help with pain, avoid straining and sitting on toilet for extended periods to pass stool.  Follow up with PCP advised.        Please note that this dictation was created using voice recognition software. I have made a reasonable attempt to correct obvious errors, but I expect that there are errors of grammar and possibly content that I did not discover before finalizing the note.    This note was electronically signed by JENNIFER Swann

## 2024-01-25 ENCOUNTER — OFFICE VISIT (OUTPATIENT)
Dept: URGENT CARE | Facility: CLINIC | Age: 29
End: 2024-01-25
Payer: COMMERCIAL

## 2024-01-25 VITALS
WEIGHT: 213.7 LBS | HEART RATE: 90 BPM | RESPIRATION RATE: 16 BRPM | HEIGHT: 68 IN | SYSTOLIC BLOOD PRESSURE: 104 MMHG | BODY MASS INDEX: 32.39 KG/M2 | TEMPERATURE: 98.4 F | OXYGEN SATURATION: 100 % | DIASTOLIC BLOOD PRESSURE: 70 MMHG

## 2024-01-25 DIAGNOSIS — K64.9 HEMORRHOIDS, UNSPECIFIED HEMORRHOID TYPE: ICD-10-CM

## 2024-01-25 PROCEDURE — 3078F DIAST BP <80 MM HG: CPT | Performed by: PHYSICIAN ASSISTANT

## 2024-01-25 PROCEDURE — RXMED WILLOW AMBULATORY MEDICATION CHARGE: Performed by: PHYSICIAN ASSISTANT

## 2024-01-25 PROCEDURE — 99213 OFFICE O/P EST LOW 20 MIN: CPT | Performed by: PHYSICIAN ASSISTANT

## 2024-01-25 PROCEDURE — 3074F SYST BP LT 130 MM HG: CPT | Performed by: PHYSICIAN ASSISTANT

## 2024-01-25 RX ORDER — DOCUSATE SODIUM 100 MG/1
100 CAPSULE, LIQUID FILLED ORAL 2 TIMES DAILY
Qty: 60 CAPSULE | Refills: 1 | Status: SHIPPED | OUTPATIENT
Start: 2024-01-25

## 2024-01-25 RX ORDER — BENZOCAINE/MENTHOL 6 MG-10 MG
1 LOZENGE MUCOUS MEMBRANE 2 TIMES DAILY
Qty: 56.8 G | Refills: 1 | Status: SHIPPED | OUTPATIENT
Start: 2024-01-25

## 2024-01-25 ASSESSMENT — ENCOUNTER SYMPTOMS
BLOOD IN STOOL: 1
FEVER: 0
DIARRHEA: 0
VOMITING: 0
CHILLS: 0
CONSTIPATION: 1
ROS GI COMMENTS: HEMORRHOIDS
ABDOMINAL PAIN: 0
NAUSEA: 0

## 2024-01-25 ASSESSMENT — FIBROSIS 4 INDEX: FIB4 SCORE: 0.45

## 2024-01-25 NOTE — PROGRESS NOTES
"Subjective:   Rain Rosado  is a 29 y.o. female who presents for Hemorrhoids (Hemorrhoids that are bleeding. Painful to pass a bowel movement. She gave birth 1 month ago, but the hemorrhoid was already present.  However, she was super constipated after having the baby.)      Hemorrhoids  Pertinent negatives include no abdominal pain, chills, fever, nausea or vomiting.   Patient returns to clinic 5 to 6 days status post last evaluation noting persistent hemorrhoids.  She has had hemorrhoids for over 1 month at this time.  She had hemorrhoids present prior to delivering baby and had been constipated prior as well.  She delivered baby around 1 month ago.  She was treated over the last week with witch hazel, Preparation H suppository and MiraLAX.  She returns to clinic complaining of persistent discomfort.  She states she became concerned because she saw bright red blood per stool.  She reports some improvement in constipation with attempts at hydration and fiber consumption.  She inquires about how fast hemorrhoids should go away and if there is specialist follow-up available.  She denies significant worsening of hemorrhoids stating some have decreased in size slightly since last evaluation.  She notes some improvement with suppository.    Review of Systems   Constitutional:  Negative for chills and fever.   Gastrointestinal:  Positive for blood in stool (BRBPR), constipation and hemorrhoids. Negative for abdominal pain, diarrhea, melena, nausea and vomiting.        Hemorrhoids       No Known Allergies     Objective:   /70 (BP Location: Right arm, Patient Position: Sitting, BP Cuff Size: Large adult)   Pulse 90   Temp 36.9 °C (98.4 °F) (Temporal)   Resp 16   Ht 1.727 m (5' 8\")   Wt 96.9 kg (213 lb 11.2 oz)   SpO2 100%   BMI 32.49 kg/m²     Physical Exam  Vitals and nursing note reviewed.   Constitutional:       General: She is not in acute distress.     Appearance: She is well-developed. She is " not diaphoretic.   HENT:      Head: Normocephalic and atraumatic.      Right Ear: External ear normal.      Left Ear: External ear normal.      Nose: Nose normal.   Eyes:      General: Lids are normal. No scleral icterus.        Right eye: No discharge.         Left eye: No discharge.      Conjunctiva/sclera: Conjunctivae normal.   Pulmonary:      Effort: Pulmonary effort is normal. No respiratory distress.   Musculoskeletal:         General: Normal range of motion.      Cervical back: Neck supple.   Skin:     General: Skin is warm and dry.      Coloration: Skin is not pale.      Findings: No erythema.   Neurological:      Mental Status: She is alert and oriented to person, place, and time. She is not disoriented.   Psychiatric:         Speech: Speech normal.         Behavior: Behavior normal.         Assessment/Plan:   1. Hemorrhoids, unspecified hemorrhoid type  - docusate sodium (COLACE) 100 MG Cap; Take 1 Capsule by mouth 2 times a day.  Dispense: 60 Capsule; Refill: 1  - hydrocortisone 1 % Cream; Apply 1 Application topically 2 times a day.  Dispense: 45 g; Refill: 1  - Referral to General Surgery      Patient is supported with additional stool softener, topical Preparation H/hydrocortisone cream, and referral to general surgery for possible hemorrhoidectomy with persistent symptoms.  Red flag symptoms reviewed.  Return to clinic with lack of resolution or progression of symptoms.      I have worn an N95 mask, gloves and eye protection for the entire encounter with this patient.     Differential diagnosis, natural history, supportive care, and indications for immediate follow-up discussed.

## 2024-01-27 ENCOUNTER — PHARMACY VISIT (OUTPATIENT)
Dept: PHARMACY | Facility: MEDICAL CENTER | Age: 29
End: 2024-01-27
Payer: MEDICARE

## 2024-07-02 ENCOUNTER — HOSPITAL ENCOUNTER (EMERGENCY)
Facility: MEDICAL CENTER | Age: 29
End: 2024-07-02
Attending: EMERGENCY MEDICINE
Payer: COMMERCIAL

## 2024-07-02 VITALS
SYSTOLIC BLOOD PRESSURE: 129 MMHG | HEIGHT: 68 IN | DIASTOLIC BLOOD PRESSURE: 96 MMHG | OXYGEN SATURATION: 100 % | HEART RATE: 95 BPM | WEIGHT: 209.22 LBS | TEMPERATURE: 99 F | RESPIRATION RATE: 16 BRPM | BODY MASS INDEX: 31.71 KG/M2

## 2024-07-02 DIAGNOSIS — K62.5 RECTAL BLEEDING: ICD-10-CM

## 2024-07-02 DIAGNOSIS — K64.9 HEMORRHOIDS, UNSPECIFIED HEMORRHOID TYPE: ICD-10-CM

## 2024-07-02 PROCEDURE — 99283 EMERGENCY DEPT VISIT LOW MDM: CPT

## 2024-07-02 RX ORDER — DOCUSATE SODIUM 100 MG/1
100 CAPSULE, LIQUID FILLED ORAL 2 TIMES DAILY
Qty: 60 CAPSULE | Refills: 0 | Status: SHIPPED | OUTPATIENT
Start: 2024-07-02

## 2024-07-02 RX ORDER — BENZOCAINE/MENTHOL 6 MG-10 MG
1 LOZENGE MUCOUS MEMBRANE 2 TIMES DAILY
Qty: 56 G | Refills: 1 | Status: SHIPPED | OUTPATIENT
Start: 2024-07-02

## 2024-07-02 RX ORDER — HYDROCORTISONE ACETATE 25 MG/1
25 SUPPOSITORY RECTAL EVERY 12 HOURS
Qty: 14 SUPPOSITORY | Refills: 0 | Status: SHIPPED | OUTPATIENT
Start: 2024-07-02 | End: 2024-07-09

## 2024-07-02 ASSESSMENT — FIBROSIS 4 INDEX: FIB4 SCORE: 0.45

## 2024-07-26 ENCOUNTER — TELEPHONE (OUTPATIENT)
Dept: PEDIATRICS | Facility: CLINIC | Age: 29
End: 2024-07-26
Payer: COMMERCIAL

## 2024-10-11 ENCOUNTER — OFFICE VISIT (OUTPATIENT)
Dept: URGENT CARE | Facility: CLINIC | Age: 29
End: 2024-10-11
Payer: COMMERCIAL

## 2024-10-11 ENCOUNTER — HOSPITAL ENCOUNTER (OUTPATIENT)
Facility: MEDICAL CENTER | Age: 29
End: 2024-10-11
Payer: COMMERCIAL

## 2024-10-11 VITALS
SYSTOLIC BLOOD PRESSURE: 124 MMHG | OXYGEN SATURATION: 100 % | DIASTOLIC BLOOD PRESSURE: 88 MMHG | HEIGHT: 68 IN | RESPIRATION RATE: 16 BRPM | WEIGHT: 208 LBS | BODY MASS INDEX: 31.52 KG/M2 | TEMPERATURE: 97.3 F | HEART RATE: 83 BPM

## 2024-10-11 DIAGNOSIS — N30.01 ACUTE CYSTITIS WITH HEMATURIA: ICD-10-CM

## 2024-10-11 DIAGNOSIS — R39.9 UTI SYMPTOMS: ICD-10-CM

## 2024-10-11 DIAGNOSIS — A59.9 TRICHOMONAS INFECTION: ICD-10-CM

## 2024-10-11 DIAGNOSIS — B37.31 VAGINAL CANDIDA: ICD-10-CM

## 2024-10-11 DIAGNOSIS — R39.9 UTI SYMPTOMS: Primary | ICD-10-CM

## 2024-10-11 LAB
APPEARANCE UR: CLEAR
BILIRUB UR STRIP-MCNC: NEGATIVE MG/DL
CANDIDA DNA VAG QL PROBE+SIG AMP: POSITIVE
COLOR UR AUTO: YELLOW
G VAGINALIS DNA VAG QL PROBE+SIG AMP: NEGATIVE
GLUCOSE UR STRIP.AUTO-MCNC: NEGATIVE MG/DL
KETONES UR STRIP.AUTO-MCNC: NEGATIVE MG/DL
LEUKOCYTE ESTERASE UR QL STRIP.AUTO: NORMAL
NITRITE UR QL STRIP.AUTO: NEGATIVE
PH UR STRIP.AUTO: 7 [PH] (ref 5–8)
POCT INT CON NEG: NEGATIVE
POCT INT CON POS: POSITIVE
POCT URINE PREGNANCY TEST: NEGATIVE
PROT UR QL STRIP: NORMAL MG/DL
RBC UR QL AUTO: NORMAL
SP GR UR STRIP.AUTO: 1.02
T VAGINALIS DNA VAG QL PROBE+SIG AMP: POSITIVE
UROBILINOGEN UR STRIP-MCNC: NORMAL MG/DL

## 2024-10-11 PROCEDURE — 81025 URINE PREGNANCY TEST: CPT

## 2024-10-11 PROCEDURE — 3074F SYST BP LT 130 MM HG: CPT

## 2024-10-11 PROCEDURE — 87510 GARDNER VAG DNA DIR PROBE: CPT

## 2024-10-11 PROCEDURE — 99214 OFFICE O/P EST MOD 30 MIN: CPT

## 2024-10-11 PROCEDURE — 81002 URINALYSIS NONAUTO W/O SCOPE: CPT

## 2024-10-11 PROCEDURE — 87480 CANDIDA DNA DIR PROBE: CPT

## 2024-10-11 PROCEDURE — RXMED WILLOW AMBULATORY MEDICATION CHARGE

## 2024-10-11 PROCEDURE — 3079F DIAST BP 80-89 MM HG: CPT

## 2024-10-11 PROCEDURE — 87660 TRICHOMONAS VAGIN DIR PROBE: CPT

## 2024-10-11 RX ORDER — METRONIDAZOLE 500 MG/1
500 TABLET ORAL 2 TIMES DAILY
Qty: 14 TABLET | Refills: 0 | Status: SHIPPED | OUTPATIENT
Start: 2024-10-11 | End: 2024-10-21

## 2024-10-11 RX ORDER — NITROFURANTOIN 25; 75 MG/1; MG/1
100 CAPSULE ORAL 2 TIMES DAILY
Qty: 10 CAPSULE | Refills: 0 | Status: SHIPPED | OUTPATIENT
Start: 2024-10-11 | End: 2024-10-19

## 2024-10-11 RX ORDER — FLUCONAZOLE 150 MG/1
TABLET ORAL
Qty: 2 TABLET | Refills: 0 | Status: SHIPPED | OUTPATIENT
Start: 2024-10-11

## 2024-10-11 ASSESSMENT — FIBROSIS 4 INDEX: FIB4 SCORE: 0.45

## 2024-10-14 ENCOUNTER — PHARMACY VISIT (OUTPATIENT)
Dept: PHARMACY | Facility: MEDICAL CENTER | Age: 29
End: 2024-10-14
Payer: MEDICARE

## 2024-10-14 PROCEDURE — RXMED WILLOW AMBULATORY MEDICATION CHARGE

## 2024-11-01 ENCOUNTER — NON-PROVIDER VISIT (OUTPATIENT)
Dept: OCCUPATIONAL MEDICINE | Facility: CLINIC | Age: 29
End: 2024-11-01

## 2024-11-01 DIAGNOSIS — Z11.1 ENCOUNTER FOR PPD TEST: Primary | ICD-10-CM

## 2024-11-01 PROCEDURE — 86580 TB INTRADERMAL TEST: CPT | Performed by: NURSE PRACTITIONER

## 2024-11-03 ENCOUNTER — NON-PROVIDER VISIT (OUTPATIENT)
Dept: URGENT CARE | Facility: CLINIC | Age: 29
End: 2024-11-03

## 2024-11-03 LAB — TB WHEAL 3D P 5 TU DIAM: NORMAL MM

## 2024-11-03 PROCEDURE — 99999 PR NO CHARGE: CPT | Performed by: NURSE PRACTITIONER

## 2024-11-03 NOTE — PROGRESS NOTES
Rain Monterroso Leena Rosado is a 29 y.o. female here for a non-provider visit for PPD reading -- Step 1 of 1.      1.  Resulted in Epic under enter/edit results? Yes   2.  TB evaluation questionnaire scanned into chart and original given to patient?Yes      3. Was induration greater than 0 mm? No.    Routed to PCP? Yes

## 2025-01-13 ENCOUNTER — HOSPITAL ENCOUNTER (OUTPATIENT)
Facility: MEDICAL CENTER | Age: 30
End: 2025-01-13
Attending: FAMILY MEDICINE
Payer: COMMERCIAL

## 2025-01-13 ENCOUNTER — OFFICE VISIT (OUTPATIENT)
Dept: URGENT CARE | Facility: CLINIC | Age: 30
End: 2025-01-13
Payer: COMMERCIAL

## 2025-01-13 VITALS
TEMPERATURE: 97.6 F | BODY MASS INDEX: 30.62 KG/M2 | RESPIRATION RATE: 20 BRPM | DIASTOLIC BLOOD PRESSURE: 74 MMHG | WEIGHT: 202 LBS | SYSTOLIC BLOOD PRESSURE: 122 MMHG | OXYGEN SATURATION: 98 % | HEIGHT: 68 IN | HEART RATE: 108 BPM

## 2025-01-13 DIAGNOSIS — N30.01 ACUTE CYSTITIS WITH HEMATURIA: ICD-10-CM

## 2025-01-13 LAB
APPEARANCE UR: CLEAR
BILIRUB UR STRIP-MCNC: NEGATIVE MG/DL
COLOR UR AUTO: YELLOW
GLUCOSE UR STRIP.AUTO-MCNC: NEGATIVE MG/DL
KETONES UR STRIP.AUTO-MCNC: NEGATIVE MG/DL
LEUKOCYTE ESTERASE UR QL STRIP.AUTO: NORMAL
NITRITE UR QL STRIP.AUTO: POSITIVE
PH UR STRIP.AUTO: 6 [PH] (ref 5–8)
PROT UR QL STRIP: >=300 MG/DL
RBC UR QL AUTO: NORMAL
SP GR UR STRIP.AUTO: >=1.03
UROBILINOGEN UR STRIP-MCNC: 0.2 MG/DL

## 2025-01-13 PROCEDURE — 87186 SC STD MICRODIL/AGAR DIL: CPT

## 2025-01-13 PROCEDURE — 99213 OFFICE O/P EST LOW 20 MIN: CPT | Performed by: FAMILY MEDICINE

## 2025-01-13 PROCEDURE — 3074F SYST BP LT 130 MM HG: CPT | Performed by: FAMILY MEDICINE

## 2025-01-13 PROCEDURE — 81002 URINALYSIS NONAUTO W/O SCOPE: CPT | Performed by: FAMILY MEDICINE

## 2025-01-13 PROCEDURE — 3078F DIAST BP <80 MM HG: CPT | Performed by: FAMILY MEDICINE

## 2025-01-13 PROCEDURE — 87086 URINE CULTURE/COLONY COUNT: CPT

## 2025-01-13 PROCEDURE — 87077 CULTURE AEROBIC IDENTIFY: CPT

## 2025-01-13 RX ORDER — CEFDINIR 300 MG/1
300 CAPSULE ORAL 2 TIMES DAILY
Qty: 10 CAPSULE | Refills: 0 | Status: SHIPPED | OUTPATIENT
Start: 2025-01-13 | End: 2025-01-30

## 2025-01-13 ASSESSMENT — FIBROSIS 4 INDEX: FIB4 SCORE: 0.46

## 2025-01-14 NOTE — PROGRESS NOTES
"Subjective:     Rain Rosado is a 30 y.o. female who presents for UTI (X 1 day)    HPI  Pt presents for evaluation of an acute problem  Pt with dysuria the past day   Having incomplete voiding   Has hematuria   Has no urinary hesitancy   No abd pain or flank pain   No fevers   No vomiting    Review of Systems   Genitourinary:  Positive for dysuria, frequency, hematuria and urgency.       PMH:  has a past medical history of Anxiety, Pre-eclampsia in third trimester (2019), and Pregnant (2023).  MEDS:   Current Outpatient Medications:     cefdinir (OMNICEF) 300 MG Cap, Take 1 Capsule by mouth 2 times a day for 5 days., Disp: 10 Capsule, Rfl: 0  ALLERGIES: No Known Allergies  SURGHX:   Past Surgical History:   Procedure Laterality Date    REPEAT C SECTOIN WITH SALPINGECTOMY N/A 2023    Procedure: REPEAT  SECTION WITH BILATERAL SALPINGECTOMY;  Surgeon: Corinne E Capurro, M.D.;  Location: [facility];  Service: Labor and Delivery    REPEAT C SECTION  2019    Procedure:  SECTION, REPEAT;  Surgeon: Corinne E Capurro, M.D.;  Location: [facility] DEL[facility];  Service: Labor and Delivery    OTHER      c/section x 3     SOCHX:  reports that she has never smoked. She has never been exposed to tobacco smoke. She has never used smokeless tobacco. She reports that she does not currently use alcohol. She reports that she does not use drugs.     Objective:   /74   Pulse (!) 108   Temp 36.4 °C (97.6 °F) (Temporal)   Resp 20   Ht 1.727 m (5' 8\")   Wt 91.6 kg (202 lb)   SpO2 98%   BMI 30.71 kg/m²     Physical Exam  Constitutional:       General: She is not in acute distress.     Appearance: She is well-developed. She is not diaphoretic.   Pulmonary:      Effort: Pulmonary effort is normal.   Abdominal:      General: Abdomen is flat. There is no distension.      Palpations: Abdomen is soft.      Tenderness: There is no right CVA tenderness, left " CVA tenderness, guarding or rebound.      Comments: Mild tenderness in suprapubic region   Neurological:      Mental Status: She is alert.         Assessment/Plan:   Assessment    1. Acute cystitis with hematuria  - POCT Urinalysis  - URINE CULTURE(NEW); Future  - cefdinir (OMNICEF) 300 MG Cap; Take 1 Capsule by mouth 2 times a day for 5 days.  Dispense: 10 Capsule; Refill: 0    Patient with acute cystitis.  No sign of pyelonephritis at this time.  Reviewed supportive care measures and expected course recovery.  Empirically treat with cefdinir and send urine for culture to rule out resistant organism.

## 2025-01-25 ENCOUNTER — PHARMACY VISIT (OUTPATIENT)
Dept: PHARMACY | Facility: MEDICAL CENTER | Age: 30
End: 2025-01-25
Payer: MEDICARE

## 2025-01-25 PROCEDURE — RXMED WILLOW AMBULATORY MEDICATION CHARGE: Performed by: FAMILY MEDICINE

## 2025-01-31 ENCOUNTER — APPOINTMENT (OUTPATIENT)
Dept: URGENT CARE | Facility: CLINIC | Age: 30
End: 2025-01-31
Payer: COMMERCIAL

## 2025-06-23 ENCOUNTER — HOSPITAL ENCOUNTER (OUTPATIENT)
Facility: MEDICAL CENTER | Age: 30
End: 2025-06-23
Payer: COMMERCIAL

## 2025-06-23 ENCOUNTER — OFFICE VISIT (OUTPATIENT)
Dept: URGENT CARE | Facility: CLINIC | Age: 30
End: 2025-06-23
Payer: COMMERCIAL

## 2025-06-23 VITALS
SYSTOLIC BLOOD PRESSURE: 108 MMHG | HEIGHT: 68 IN | HEART RATE: 94 BPM | TEMPERATURE: 97.8 F | WEIGHT: 208.8 LBS | DIASTOLIC BLOOD PRESSURE: 68 MMHG | OXYGEN SATURATION: 99 % | RESPIRATION RATE: 18 BRPM | BODY MASS INDEX: 31.64 KG/M2

## 2025-06-23 DIAGNOSIS — N89.8 VAGINAL DISCHARGE: ICD-10-CM

## 2025-06-23 DIAGNOSIS — N89.8 VAGINAL DISCHARGE: Primary | ICD-10-CM

## 2025-06-23 PROCEDURE — 3074F SYST BP LT 130 MM HG: CPT

## 2025-06-23 PROCEDURE — 3078F DIAST BP <80 MM HG: CPT

## 2025-06-23 PROCEDURE — 99213 OFFICE O/P EST LOW 20 MIN: CPT

## 2025-06-23 PROCEDURE — 87510 GARDNER VAG DNA DIR PROBE: CPT

## 2025-06-23 PROCEDURE — 87480 CANDIDA DNA DIR PROBE: CPT

## 2025-06-23 PROCEDURE — 87660 TRICHOMONAS VAGIN DIR PROBE: CPT

## 2025-06-23 ASSESSMENT — FIBROSIS 4 INDEX: FIB4 SCORE: 0.46

## 2025-06-23 NOTE — PROGRESS NOTES
"Subjective:   Rain Rosado is a 30 y.o. female who presents for Vaginal Discharge (Sx started 3 days ago with vaginal discharge, odor, no itchiness, no uti sx's)    Patient presents to the clinic for complaints of vaginal discharge x 2 days.  Vaginal discharge has been  Otherwise, no odor, pain, or itchiness to the vaginal/genital area.  Hx of Trichomonas infection.  Has taken no meds for the vaginal discharge.  Patient denies chest pain, SOB, dizziness/lightheadedness/vertigo, nausea/vomiting/diarrhea, difficulty breathing or swallowing, palpitations or racing heart rate, abdominal pain, pelvic pain, abnormal vaginal bleeding, lesions to the genital area, dysuria, or blood in the urine.      HPI    ROS  Refer to HPI for additional details.    During this visit, appropriate PPE was worn, and hand hygiene was performed.    PMH:  has a past medical history of Anxiety, Pre-eclampsia in third trimester (12/28/2019), and Pregnant (12/18/2023).    MEDS: Current Medications[1]    ALLERGIES: Allergies[2]  SURGHX: Past Surgical History[3]  SOCHX:  reports that she has never smoked. She has never been exposed to tobacco smoke. She has never used smokeless tobacco. She reports that she does not currently use alcohol. She reports that she does not use drugs.    FH: Per HPI as applicable/pertinent.    Medications, Allergies, and current problem list reviewed today in Epic.     Objective:     /68   Pulse 94   Temp 36.6 °C (97.8 °F) (Temporal)   Resp 18   Ht 1.727 m (5' 8\")   Wt 94.7 kg (208 lb 12.8 oz)   SpO2 99%     Physical Exam  Constitutional:       Appearance: Normal appearance.   HENT:      Head: Normocephalic.      Right Ear: External ear normal.      Left Ear: External ear normal.      Nose: No congestion.   Eyes:      Extraocular Movements: Extraocular movements intact.      Conjunctiva/sclera: Conjunctivae normal.   Cardiovascular:      Rate and Rhythm: Normal rate and regular " rhythm.      Pulses: Normal pulses.   Pulmonary:      Effort: Pulmonary effort is normal.   Abdominal:      General: Abdomen is flat.   Genitourinary:     Comments: Assessment deferred at this time  Musculoskeletal:         General: Normal range of motion.   Skin:     General: Skin is warm and dry.      Capillary Refill: Capillary refill takes less than 2 seconds.      Coloration: Skin is not jaundiced or pale.   Neurological:      General: No focal deficit present.      Mental Status: She is alert and oriented to person, place, and time.   Psychiatric:         Mood and Affect: Mood normal.         Thought Content: Thought content normal.         Judgment: Judgment normal.         Assessment/Plan:     Diagnosis and associated orders:     1. Vaginal discharge  - VAGINAL PATHOGENS DNA PANEL; Future     Comments/MDM:     Vaginal pathogen swab collected from clinic for workup of the patient's vaginal discharge. Discussed that they will receive a phone call or [employer] message from this provider regarding the results of the tests along with any changes with medication or treatment plan as appropriate.  Instructed patient to refrain from vaginal sexual activity until swab results come back and necessary treatments are started.  Patient agreeable to this plan of care.  All questions answered.  Return to clinic if symptoms persist or worsen.  Red flag symptoms warranting emergency medical services discussed, including but not limited to chest pain, SOB, wheezing, difficulty breathing or swallowing, sensation of throat closing or mass in the throat, severe intractable headache, changes to vision or hearing, palpitations or racing heart rate, abdominal pain, fever greater than 103F despite medication management, pelvic pain, vaginal bleeding that is abnormal, vaginal pain, dizziness/lightheadedness/vertigo, or nausea/vomiting/diarrhea.           Differential diagnosis, natural history, supportive care, and indications for  immediate follow-up discussed.    Advised the patient to follow-up with the primary care physician for recheck, reevaluation, and consideration of further management.    Instructed patient to seek emergency medical attention via calling EMS or going to the Emergency Room for red flag symptoms, including but not limited to: chest pain, palpitations, fever greater than 103F, shortness of breath, wheezing, new or worsened numbness/tingling, focal or unilateral weakness, and bloody vomit/stool.     Please note that this dictation was created using voice recognition software. I have made a reasonable attempt to correct obvious errors, but I expect that there are errors of grammar and possibly content that I did not discover before finalizing the note.    This note was electronically signed by KEO Borden           [1] No current outpatient medications on file.  [2] No Known Allergies  [3]   Past Surgical History:  Procedure Laterality Date    REPEAT C SECTOIN WITH SALPINGECTOMY N/A 2023    Procedure: REPEAT  SECTION WITH BILATERAL SALPINGECTOMY;  Surgeon: Corinne E Capurro, M.D.;  [facility];  Service: Labor and Delivery    REPEAT C SECTION  2019    Procedure:  SECTION, REPEAT;  Surgeon: Corinne E Capurro, M.D.;  Location: [facility];  Service: Labor and Delivery    OTHER      c/section x 3

## 2025-06-24 LAB
CANDIDA DNA VAG QL PROBE+SIG AMP: NEGATIVE
G VAGINALIS DNA VAG QL PROBE+SIG AMP: POSITIVE
T VAGINALIS DNA VAG QL PROBE+SIG AMP: NEGATIVE

## 2025-06-25 ENCOUNTER — RESULTS FOLLOW-UP (OUTPATIENT)
Dept: URGENT CARE | Facility: CLINIC | Age: 30
End: 2025-06-25
Payer: COMMERCIAL

## 2025-06-25 DIAGNOSIS — B96.89 BACTERIAL VAGINOSIS: Primary | ICD-10-CM

## 2025-06-25 DIAGNOSIS — N76.0 BACTERIAL VAGINOSIS: Primary | ICD-10-CM

## 2025-06-25 PROCEDURE — RXMED WILLOW AMBULATORY MEDICATION CHARGE

## 2025-06-25 RX ORDER — METRONIDAZOLE 500 MG/1
500 TABLET ORAL 2 TIMES DAILY
Qty: 14 TABLET | Refills: 0 | Status: SHIPPED | OUTPATIENT
Start: 2025-06-25 | End: 2025-07-03

## 2025-06-25 NOTE — RESULT ENCOUNTER NOTE
Rain,  I just reviewed the results of your vaginal swab, and it did come back positive for Gardnerella, one of the causative organisms for bacterial vaginosis which you have at this time.  I will send in a course of antibiotics called metronidazole.  It will be twice a day for 1 week.  Please do not consume any alcohol with this medication and you may take this medication with food to prevent any stomach upset with the medication.  If you have any further questions, please reach out, and follow-up in the clinic if your symptoms persist despite our treatment plan.  Otherwise, take care and have a great day.    JENNIFER Borden.

## 2025-06-26 ENCOUNTER — PHARMACY VISIT (OUTPATIENT)
Dept: PHARMACY | Facility: MEDICAL CENTER | Age: 30
End: 2025-06-26
Payer: MEDICARE

## 2025-08-09 ENCOUNTER — OFFICE VISIT (OUTPATIENT)
Dept: URGENT CARE | Facility: CLINIC | Age: 30
End: 2025-08-09
Payer: COMMERCIAL

## 2025-08-09 ENCOUNTER — PHARMACY VISIT (OUTPATIENT)
Dept: PHARMACY | Facility: MEDICAL CENTER | Age: 30
End: 2025-08-09
Payer: MEDICARE

## 2025-08-09 VITALS
DIASTOLIC BLOOD PRESSURE: 68 MMHG | TEMPERATURE: 97.2 F | WEIGHT: 205 LBS | RESPIRATION RATE: 14 BRPM | SYSTOLIC BLOOD PRESSURE: 108 MMHG | OXYGEN SATURATION: 98 % | HEIGHT: 68 IN | HEART RATE: 82 BPM | BODY MASS INDEX: 31.07 KG/M2

## 2025-08-09 DIAGNOSIS — G89.29 UPPER BACK PAIN, CHRONIC: Primary | ICD-10-CM

## 2025-08-09 DIAGNOSIS — M54.9 UPPER BACK PAIN, CHRONIC: Primary | ICD-10-CM

## 2025-08-09 PROCEDURE — 99213 OFFICE O/P EST LOW 20 MIN: CPT | Mod: 25

## 2025-08-09 PROCEDURE — RXMED WILLOW AMBULATORY MEDICATION CHARGE

## 2025-08-09 PROCEDURE — 96372 THER/PROPH/DIAG INJ SC/IM: CPT

## 2025-08-09 RX ORDER — CYCLOBENZAPRINE HCL 5 MG
5-10 TABLET ORAL
Qty: 15 TABLET | Refills: 0 | Status: SHIPPED | OUTPATIENT
Start: 2025-08-09

## 2025-08-09 RX ORDER — KETOROLAC TROMETHAMINE 15 MG/ML
15 INJECTION, SOLUTION INTRAMUSCULAR; INTRAVENOUS ONCE
Status: COMPLETED | OUTPATIENT
Start: 2025-08-09 | End: 2025-08-09

## 2025-08-09 RX ADMIN — KETOROLAC TROMETHAMINE 15 MG: 15 INJECTION, SOLUTION INTRAMUSCULAR; INTRAVENOUS at 10:08

## 2025-08-09 ASSESSMENT — FIBROSIS 4 INDEX: FIB4 SCORE: 0.46

## (undated) DEVICE — CHLORAPREP 26 ML APPLICATOR - ORANGE TINT(25/CA)

## (undated) DEVICE — RETRACTOR O C SECTION LRY - (5/BX)

## (undated) DEVICE — SLEEVE, SEQUENTIAL CALF REG

## (undated) DEVICE — LIGASURE SM JAW SEALER CRVD - (6EA/CA)

## (undated) DEVICE — CANISTER SUCTION 3000ML MECHANICAL FILTER AUTO SHUTOFF MEDI-VAC NONSTERILE LF DISP  (40EA/CA)

## (undated) DEVICE — STAPLER SKIN INSORB (6EA/BX)

## (undated) DEVICE — DRESSING POST OP BORDER 4 X 10 (5EA/BX)

## (undated) DEVICE — SOLUTION PLASMA-LYTE PH 7.4 INJ 1000ML  (14EA/CA)

## (undated) DEVICE — SODIUM CHL IRRIGATION 0.9% 1000ML (12EA/CA)

## (undated) DEVICE — GLOVE BIOGEL SZ 6 PF LATEX - (50EA/BX 4BX/CA)

## (undated) DEVICE — KIT  I.V. START (100EA/CA)

## (undated) DEVICE — PACK ROOM TURNOVER L&D (12/CA)

## (undated) DEVICE — WATER IRRIG. STER. 1500 ML - (9/CA)

## (undated) DEVICE — ELECTRODE DUAL RETURN W/ CORD - (50/PK)

## (undated) DEVICE — KIT SKIN NOSE AND MOUTH PRE-OP (20/CA)

## (undated) DEVICE — STAPLER SKIN DISP - (6/BX 10BX/CA) VISISTAT

## (undated) DEVICE — HEAD HOLDER JUNIOR/ADULT

## (undated) DEVICE — SHEATH RO 4F 10CM (10EA/BX)

## (undated) DEVICE — BLANKET UNDERBODY FULL ACCES - (5/CA)

## (undated) DEVICE — TUBING CLEARLINK DUO-VENT - C-FLO (48EA/CA)

## (undated) DEVICE — PAD LAP STERILE 18 X 18 - (5/PK 40PK/CA)

## (undated) DEVICE — PENCIL ELECTSURG 10FT HLSTR - WITH BLADE (50EA/CA)

## (undated) DEVICE — TRAY SPINAL ANESTHESIA NON-SAFETY (10/CA)

## (undated) DEVICE — CATHETER IV NON-SAFETY 18 GA X 1 1/4 (50/BX 4BX/CA)

## (undated) DEVICE — SUTURE 3-0 VICRYL PLUS CT-1 - 36 INCH (36/BX)

## (undated) DEVICE — SET EXTENSION WITH 2 PORTS (48EA/CA) ***PART #2C8610 IS A SUBSTITUTE*****

## (undated) DEVICE — BLANKET STERILE CHICKIE FOR L&D (100/CA)

## (undated) DEVICE — BAG SPONGE COUNT 10.25 X 32 - BLUE (250/CA)

## (undated) DEVICE — DETERGENT RENUZYME PLUS 10 OZ PACKET (50/BX)

## (undated) DEVICE — BLANKET UNDERBODY ADULT - (10/CA)

## (undated) DEVICE — SUTURE 0 VICRYL PLUS CTX - 36 INCH (36/BX)

## (undated) DEVICE — BAG, SPONGE COUNT 50600

## (undated) DEVICE — PACK C-SECTION (2EA/CA)

## (undated) DEVICE — TAPE CLOTH MEDIPORE 6 INCH - (12RL/CA)

## (undated) DEVICE — CANNULA O2 COMFORT SOFT EAR ADULT 7 FT TUBING (50/CA)

## (undated) DEVICE — WATER IRRIGATION STERILE 1000ML (12EA/CA)